# Patient Record
Sex: MALE | Race: BLACK OR AFRICAN AMERICAN | Employment: OTHER | URBAN - METROPOLITAN AREA
[De-identification: names, ages, dates, MRNs, and addresses within clinical notes are randomized per-mention and may not be internally consistent; named-entity substitution may affect disease eponyms.]

---

## 2017-03-02 ENCOUNTER — HOSPITAL ENCOUNTER (OUTPATIENT)
Dept: ULTRASOUND IMAGING | Age: 82
Discharge: HOME | End: 2017-03-02
Attending: SPECIALIST | Admitting: SPECIALIST

## 2017-03-02 DIAGNOSIS — N28.89 OTHER SPECIFIED DISORDERS OF KIDNEY AND URETER: ICD-10-CM

## 2017-03-31 ENCOUNTER — HOSPITAL ENCOUNTER (OUTPATIENT)
Dept: GENERAL RADIOLOGY | Age: 82
Discharge: HOME | End: 2017-03-31
Attending: INTERNAL MEDICINE | Admitting: INTERNAL MEDICINE

## 2017-03-31 LAB
ANION GAP SERPL CALCULATED.3IONS-SCNC: 15 MMOL/L (ref 4–16)
BUN BLDV-MCNC: 27 MG/DL (ref 6–23)
CALCIUM SERPL-MCNC: 9.1 MG/DL (ref 8.3–10.6)
CHLORIDE BLD-SCNC: 100 MMOL/L (ref 99–110)
CO2: 25 MMOL/L (ref 21–32)
CREAT SERPL-MCNC: 2 MG/DL (ref 0.9–1.3)
GFR AFRICAN AMERICAN: 39 ML/MIN/1.73M2
GFR NON-AFRICAN AMERICAN: 32 ML/MIN/1.73M2
GLUCOSE BLD-MCNC: 101 MG/DL (ref 70–140)
POTASSIUM SERPL-SCNC: 3.8 MMOL/L (ref 3.5–5.1)
SODIUM BLD-SCNC: 140 MMOL/L (ref 135–145)
T4 FREE: 1.15 NG/DL (ref 0.9–1.8)
TSH HIGH SENSITIVITY: 0.02 UIU/ML (ref 0.27–4.2)
VITAMIN D 25-HYDROXY: 60 NG/ML

## 2017-04-10 ENCOUNTER — HOSPITAL ENCOUNTER (OUTPATIENT)
Dept: GENERAL RADIOLOGY | Age: 82
Discharge: HOME | End: 2017-04-10
Attending: INTERNAL MEDICINE | Admitting: INTERNAL MEDICINE

## 2017-04-10 LAB
ALBUMIN SERPL-MCNC: 3.8 GM/DL (ref 3.4–5)
ANION GAP SERPL CALCULATED.3IONS-SCNC: 13 MMOL/L (ref 4–16)
BUN BLDV-MCNC: 25 MG/DL (ref 6–23)
CALCIUM SERPL-MCNC: 9.2 MG/DL (ref 8.3–10.6)
CHLORIDE BLD-SCNC: 99 MMOL/L (ref 99–110)
CO2: 25 MMOL/L (ref 21–32)
CREAT SERPL-MCNC: 2.1 MG/DL (ref 0.9–1.3)
GFR AFRICAN AMERICAN: 37 ML/MIN/1.73M2
GFR NON-AFRICAN AMERICAN: 31 ML/MIN/1.73M2
GLUCOSE BLD-MCNC: 110 MG/DL (ref 70–140)
PHOSPHORUS: 2.8 MG/DL (ref 2.5–4.9)
POTASSIUM SERPL-SCNC: 3.9 MMOL/L (ref 3.5–5.1)
SODIUM BLD-SCNC: 137 MMOL/L (ref 135–145)

## 2017-06-26 ENCOUNTER — HOSPITAL ENCOUNTER (OUTPATIENT)
Dept: GENERAL RADIOLOGY | Age: 82
Discharge: HOME | End: 2017-06-26
Attending: INTERNAL MEDICINE | Admitting: INTERNAL MEDICINE

## 2017-06-26 DIAGNOSIS — M85.9 DISORDER OF BONE DENSITY AND STRUCTURE, UNSPECIFIED: ICD-10-CM

## 2017-06-26 LAB
ANION GAP SERPL CALCULATED.3IONS-SCNC: 13 MMOL/L (ref 4–16)
BUN BLDV-MCNC: 30 MG/DL (ref 6–23)
CALCIUM SERPL-MCNC: 9.3 MG/DL (ref 8.3–10.6)
CHLORIDE BLD-SCNC: 103 MMOL/L (ref 99–110)
CO2: 26 MMOL/L (ref 21–32)
CREAT SERPL-MCNC: 1.9 MG/DL (ref 0.9–1.3)
GFR AFRICAN AMERICAN: 41 ML/MIN/1.73M2
GFR NON-AFRICAN AMERICAN: 34 ML/MIN/1.73M2
GLUCOSE BLD-MCNC: 83 MG/DL (ref 70–140)
POTASSIUM SERPL-SCNC: 4.4 MMOL/L (ref 3.5–5.1)
SODIUM BLD-SCNC: 142 MMOL/L (ref 135–145)
T4 FREE: 1.26 NG/DL (ref 0.9–1.8)
TSH HIGH SENSITIVITY: 0.02 UIU/ML (ref 0.27–4.2)
VITAMIN D 25-HYDROXY: 60 NG/ML

## 2017-06-28 LAB
SEX HORMONE BINDING GLOBULIN: 81
TESTOSTERONE FREE PERCENT: 1.1
TESTOSTERONE FREE: 67
TESTOSTERONE TOTAL-MALE: 624

## 2017-08-25 ENCOUNTER — HOSPITAL ENCOUNTER (OUTPATIENT)
Dept: CT IMAGING | Age: 82
Discharge: HOME | End: 2017-08-25
Attending: FAMILY MEDICINE | Admitting: FAMILY MEDICINE

## 2017-08-25 DIAGNOSIS — H93.13 TINNITUS OF BOTH EARS: ICD-10-CM

## 2017-08-25 DIAGNOSIS — J01.90 ACUTE SINUSITIS, UNSPECIFIED: ICD-10-CM

## 2017-09-21 ENCOUNTER — HOSPITAL ENCOUNTER (OUTPATIENT)
Dept: GENERAL RADIOLOGY | Age: 82
Discharge: HOME | End: 2017-09-21
Attending: INTERNAL MEDICINE | Admitting: INTERNAL MEDICINE

## 2017-09-21 LAB
ALBUMIN SERPL-MCNC: 3.8 GM/DL (ref 3.4–5)
ANION GAP SERPL CALCULATED.3IONS-SCNC: 15 MMOL/L (ref 4–16)
BUN BLDV-MCNC: 25 MG/DL (ref 6–23)
CALCIUM SERPL-MCNC: 8.9 MG/DL (ref 8.3–10.6)
CHLORIDE BLD-SCNC: 103 MMOL/L (ref 99–110)
CO2: 22 MMOL/L (ref 21–32)
CREAT SERPL-MCNC: 1.9 MG/DL (ref 0.9–1.3)
GFR AFRICAN AMERICAN: 41 ML/MIN/1.73M2
GFR NON-AFRICAN AMERICAN: 34 ML/MIN/1.73M2
GLUCOSE BLD-MCNC: 64 MG/DL (ref 70–140)
PHOSPHORUS: 3.5 MG/DL (ref 2.5–4.9)
POTASSIUM SERPL-SCNC: 4.3 MMOL/L (ref 3.5–5.1)
SODIUM BLD-SCNC: 140 MMOL/L (ref 135–145)

## 2017-09-29 ENCOUNTER — HOSPITAL ENCOUNTER (OUTPATIENT)
Dept: GENERAL RADIOLOGY | Age: 82
Discharge: HOME | End: 2017-09-29
Attending: INTERNAL MEDICINE | Admitting: INTERNAL MEDICINE

## 2017-09-29 LAB
ANION GAP SERPL CALCULATED.3IONS-SCNC: 13 MMOL/L (ref 4–16)
BUN BLDV-MCNC: 24 MG/DL (ref 6–23)
CALCIUM SERPL-MCNC: 9.1 MG/DL (ref 8.3–10.6)
CHLORIDE BLD-SCNC: 102 MMOL/L (ref 99–110)
CO2: 24 MMOL/L (ref 21–32)
CREAT SERPL-MCNC: 1.9 MG/DL (ref 0.9–1.3)
GFR AFRICAN AMERICAN: 41 ML/MIN/1.73M2
GFR NON-AFRICAN AMERICAN: 34 ML/MIN/1.73M2
GLUCOSE BLD-MCNC: 83 MG/DL (ref 70–140)
POTASSIUM SERPL-SCNC: 4.1 MMOL/L (ref 3.5–5.1)
SODIUM BLD-SCNC: 139 MMOL/L (ref 135–145)
T4 FREE: 1.27 NG/DL (ref 0.9–1.8)
TSH HIGH SENSITIVITY: 0.02 UIU/ML (ref 0.27–4.2)

## 2017-12-27 ENCOUNTER — HOSPITAL ENCOUNTER (OUTPATIENT)
Dept: GENERAL RADIOLOGY | Age: 82
Discharge: HOME | End: 2017-12-27
Attending: INTERNAL MEDICINE | Admitting: INTERNAL MEDICINE

## 2017-12-27 LAB
ANION GAP SERPL CALCULATED.3IONS-SCNC: 14 MMOL/L (ref 4–16)
BUN BLDV-MCNC: 30 MG/DL (ref 6–23)
CALCIUM SERPL-MCNC: 9.3 MG/DL (ref 8.3–10.6)
CHLORIDE BLD-SCNC: 105 MMOL/L (ref 99–110)
CO2: 25 MMOL/L (ref 21–32)
CREAT SERPL-MCNC: 1.8 MG/DL (ref 0.9–1.3)
GFR AFRICAN AMERICAN: 44 ML/MIN/1.73M2
GFR NON-AFRICAN AMERICAN: 36 ML/MIN/1.73M2
GLUCOSE BLD-MCNC: 71 MG/DL (ref 70–99)
POTASSIUM SERPL-SCNC: 4.1 MMOL/L (ref 3.5–5.1)
SODIUM BLD-SCNC: 144 MMOL/L (ref 135–145)
T4 FREE: 1.3 NG/DL (ref 0.9–1.8)
TSH HIGH SENSITIVITY: 0.03 UIU/ML (ref 0.27–4.2)

## 2018-04-02 ENCOUNTER — HOSPITAL ENCOUNTER (OUTPATIENT)
Dept: GENERAL RADIOLOGY | Age: 83
Discharge: HOME | End: 2018-04-02
Attending: INTERNAL MEDICINE | Admitting: INTERNAL MEDICINE

## 2018-04-02 LAB
ALBUMIN SERPL-MCNC: 3.9 GM/DL (ref 3.4–5)
ANION GAP SERPL CALCULATED.3IONS-SCNC: 13 MMOL/L (ref 4–16)
BUN BLDV-MCNC: 29 MG/DL (ref 6–23)
CALCIUM SERPL-MCNC: 9.3 MG/DL (ref 8.3–10.6)
CHLORIDE BLD-SCNC: 100 MMOL/L (ref 99–110)
CO2: 27 MMOL/L (ref 21–32)
CREAT SERPL-MCNC: 1.9 MG/DL (ref 0.9–1.3)
GFR AFRICAN AMERICAN: 41 ML/MIN/1.73M2
GFR NON-AFRICAN AMERICAN: 34 ML/MIN/1.73M2
GLUCOSE BLD-MCNC: 83 MG/DL (ref 70–99)
PHOSPHORUS: 3.5 MG/DL (ref 2.5–4.9)
POTASSIUM SERPL-SCNC: 4.4 MMOL/L (ref 3.5–5.1)
SODIUM BLD-SCNC: 140 MMOL/L (ref 135–145)

## 2018-07-11 ENCOUNTER — HOSPITAL ENCOUNTER (OUTPATIENT)
Dept: GENERAL RADIOLOGY | Age: 83
Discharge: HOME | End: 2018-07-11
Attending: INTERNAL MEDICINE | Admitting: INTERNAL MEDICINE

## 2018-07-11 LAB
ANION GAP SERPL CALCULATED.3IONS-SCNC: 16 MMOL/L (ref 4–16)
BUN BLDV-MCNC: 23 MG/DL (ref 6–23)
CALCIUM SERPL-MCNC: 8.8 MG/DL (ref 8.3–10.6)
CHLORIDE BLD-SCNC: 104 MMOL/L (ref 99–110)
CO2: 23 MMOL/L (ref 21–32)
CREAT SERPL-MCNC: 1.9 MG/DL (ref 0.9–1.3)
GFR AFRICAN AMERICAN: 41 ML/MIN/1.73M2
GFR NON-AFRICAN AMERICAN: 34 ML/MIN/1.73M2
GLUCOSE BLD-MCNC: 101 MG/DL (ref 70–99)
POTASSIUM SERPL-SCNC: 4 MMOL/L (ref 3.5–5.1)
SODIUM BLD-SCNC: 143 MMOL/L (ref 135–145)
T4 FREE: 1.05 NG/DL (ref 0.9–1.8)
TSH HIGH SENSITIVITY: 0.04 UIU/ML (ref 0.27–4.2)

## 2018-10-08 ENCOUNTER — HOSPITAL ENCOUNTER (OUTPATIENT)
Age: 83
Discharge: HOME | End: 2018-10-08
Payer: MEDICARE

## 2018-10-08 LAB
ALBUMIN SERPL-MCNC: 3.8 GM/DL (ref 3.4–5)
ANION GAP SERPL CALCULATED.3IONS-SCNC: 12 MMOL/L (ref 4–16)
BACTERIA: NEGATIVE /HPF
BILIRUBIN URINE: NEGATIVE MG/DL
BLOOD, URINE: NEGATIVE
BUN BLDV-MCNC: 23 MG/DL (ref 6–23)
CALCIUM SERPL-MCNC: 8.8 MG/DL (ref 8.3–10.6)
CHLORIDE BLD-SCNC: 105 MMOL/L (ref 99–110)
CLARITY: CLEAR
CO2: 23 MMOL/L (ref 21–32)
COLOR: ABNORMAL
CREAT SERPL-MCNC: 1.9 MG/DL (ref 0.9–1.3)
GFR AFRICAN AMERICAN: 41 ML/MIN/1.73M2
GFR NON-AFRICAN AMERICAN: 34 ML/MIN/1.73M2
GLUCOSE BLD-MCNC: 102 MG/DL (ref 70–99)
GLUCOSE, URINE: NEGATIVE MG/DL
KETONES, URINE: NEGATIVE MG/DL
LEUKOCYTE ESTERASE, URINE: NEGATIVE
MUCUS: ABNORMAL HPF
NITRITE URINE, QUANTITATIVE: NEGATIVE
PH, URINE: 5 (ref 5–8)
PHOSPHORUS: 2.2 MG/DL (ref 2.5–4.9)
POTASSIUM SERPL-SCNC: 4.3 MMOL/L (ref 3.5–5.1)
PROTEIN UA: NEGATIVE MG/DL
RBC URINE: ABNORMAL /HPF (ref 0–3)
SODIUM BLD-SCNC: 140 MMOL/L (ref 135–145)
SPECIFIC GRAVITY UA: 1.01 (ref 1–1.03)
TRICHOMONAS: ABNORMAL /HPF
UROBILINOGEN, URINE: NORMAL MG/DL (ref 0.2–1)
WBC UA: <1 /HPF (ref 0–2)

## 2018-10-08 PROCEDURE — 81001 URINALYSIS AUTO W/SCOPE: CPT

## 2018-10-08 PROCEDURE — 80069 RENAL FUNCTION PANEL: CPT

## 2018-10-08 PROCEDURE — 36415 COLL VENOUS BLD VENIPUNCTURE: CPT

## 2018-11-05 ENCOUNTER — HOSPITAL ENCOUNTER (OUTPATIENT)
Dept: ULTRASOUND IMAGING | Age: 83
Discharge: HOME | End: 2018-11-05
Payer: MEDICARE

## 2018-11-05 DIAGNOSIS — N28.1 ACQUIRED CYST OF KIDNEY: ICD-10-CM

## 2018-11-05 PROCEDURE — 76775 US EXAM ABDO BACK WALL LIM: CPT

## 2019-01-09 ENCOUNTER — HOSPITAL ENCOUNTER (OUTPATIENT)
Age: 84
Discharge: HOME | End: 2019-01-09
Payer: MEDICARE

## 2019-01-09 LAB
ALBUMIN SERPL-MCNC: 4 GM/DL (ref 3.4–5)
ANION GAP SERPL CALCULATED.3IONS-SCNC: 13 MMOL/L (ref 4–16)
BUN BLDV-MCNC: 25 MG/DL (ref 6–23)
CALCIUM SERPL-MCNC: 9.1 MG/DL (ref 8.3–10.6)
CHLORIDE BLD-SCNC: 104 MMOL/L (ref 99–110)
CO2: 24 MMOL/L (ref 21–32)
CREAT SERPL-MCNC: 2.1 MG/DL (ref 0.9–1.3)
GFR AFRICAN AMERICAN: 37 ML/MIN/1.73M2
GFR NON-AFRICAN AMERICAN: 30 ML/MIN/1.73M2
GLUCOSE BLD-MCNC: 111 MG/DL (ref 70–99)
PHOSPHORUS: 2.2 MG/DL (ref 2.5–4.9)
POTASSIUM SERPL-SCNC: 4 MMOL/L (ref 3.5–5.1)
SODIUM BLD-SCNC: 141 MMOL/L (ref 135–145)

## 2019-01-09 PROCEDURE — 80069 RENAL FUNCTION PANEL: CPT

## 2019-01-09 PROCEDURE — 36415 COLL VENOUS BLD VENIPUNCTURE: CPT

## 2019-01-15 ENCOUNTER — HOSPITAL ENCOUNTER (OUTPATIENT)
Age: 84
Discharge: HOME | End: 2019-01-15
Payer: MEDICARE

## 2019-01-15 LAB
ANION GAP SERPL CALCULATED.3IONS-SCNC: 12 MMOL/L (ref 4–16)
BUN BLDV-MCNC: 28 MG/DL (ref 6–23)
CALCIUM SERPL-MCNC: 8.9 MG/DL (ref 8.3–10.6)
CHLORIDE BLD-SCNC: 108 MMOL/L (ref 99–110)
CO2: 24 MMOL/L (ref 21–32)
CREAT SERPL-MCNC: 2.1 MG/DL (ref 0.9–1.3)
GFR AFRICAN AMERICAN: 37 ML/MIN/1.73M2
GFR NON-AFRICAN AMERICAN: 30 ML/MIN/1.73M2
GLUCOSE BLD-MCNC: 100 MG/DL (ref 70–99)
POTASSIUM SERPL-SCNC: 3.8 MMOL/L (ref 3.5–5.1)
SODIUM BLD-SCNC: 144 MMOL/L (ref 135–145)
T4 FREE: 1.07 NG/DL (ref 0.9–1.8)
TSH HIGH SENSITIVITY: 0.02 UIU/ML (ref 0.27–4.2)
VITAMIN D 25-HYDROXY: 60 NG/ML

## 2019-01-15 PROCEDURE — 82306 VITAMIN D 25 HYDROXY: CPT

## 2019-01-15 PROCEDURE — 84439 ASSAY OF FREE THYROXINE: CPT

## 2019-01-15 PROCEDURE — 84270 ASSAY OF SEX HORMONE GLOBUL: CPT

## 2019-01-15 PROCEDURE — 36415 COLL VENOUS BLD VENIPUNCTURE: CPT

## 2019-01-15 PROCEDURE — 80048 BASIC METABOLIC PNL TOTAL CA: CPT

## 2019-01-15 PROCEDURE — 84443 ASSAY THYROID STIM HORMONE: CPT

## 2019-01-15 PROCEDURE — 84403 ASSAY OF TOTAL TESTOSTERONE: CPT

## 2019-01-18 LAB
SEX HORMONE BINDING GLOBULIN: 116
TESTOSTERONE FREE PERCENT: 0.8
TESTOSTERONE FREE: 40
TESTOSTERONE TOTAL-MALE: 522

## 2019-05-13 ENCOUNTER — HOSPITAL ENCOUNTER (OUTPATIENT)
Age: 84
Discharge: HOME | End: 2019-05-13
Payer: MEDICARE

## 2019-05-13 LAB
ALBUMIN SERPL-MCNC: 3.7 GM/DL (ref 3.4–5)
ANION GAP SERPL CALCULATED.3IONS-SCNC: 13 MMOL/L (ref 4–16)
BUN BLDV-MCNC: 24 MG/DL (ref 6–23)
CALCIUM SERPL-MCNC: 9.1 MG/DL (ref 8.3–10.6)
CHLORIDE BLD-SCNC: 105 MMOL/L (ref 99–110)
CO2: 23 MMOL/L (ref 21–32)
CREAT SERPL-MCNC: 1.9 MG/DL (ref 0.9–1.3)
GFR AFRICAN AMERICAN: 41 ML/MIN/1.73M2
GFR NON-AFRICAN AMERICAN: 34 ML/MIN/1.73M2
GLUCOSE BLD-MCNC: 117 MG/DL (ref 70–99)
PHOSPHORUS: 2.5 MG/DL (ref 2.5–4.9)
POTASSIUM SERPL-SCNC: 3.6 MMOL/L (ref 3.5–5.1)
SODIUM BLD-SCNC: 141 MMOL/L (ref 135–145)
URIC ACID: 7.5 MG/DL (ref 3.5–7.2)

## 2019-05-13 PROCEDURE — 84550 ASSAY OF BLOOD/URIC ACID: CPT

## 2019-05-13 PROCEDURE — 36415 COLL VENOUS BLD VENIPUNCTURE: CPT

## 2019-05-13 PROCEDURE — 80069 RENAL FUNCTION PANEL: CPT

## 2019-07-15 ENCOUNTER — HOSPITAL ENCOUNTER (OUTPATIENT)
Age: 84
Discharge: HOME | End: 2019-07-15
Payer: MEDICARE

## 2019-07-15 LAB
ALBUMIN SERPL-MCNC: 3.9 GM/DL (ref 3.4–5)
ALP BLD-CCNC: 79 IU/L (ref 40–128)
ALT SERPL-CCNC: 10 U/L (ref 10–40)
ANION GAP SERPL CALCULATED.3IONS-SCNC: 12 MMOL/L (ref 4–16)
AST SERPL-CCNC: 14 IU/L (ref 15–37)
BILIRUB SERPL-MCNC: 0.3 MG/DL (ref 0–1)
BUN BLDV-MCNC: 31 MG/DL (ref 6–23)
CALCIUM SERPL-MCNC: 9.4 MG/DL (ref 8.3–10.6)
CHLORIDE BLD-SCNC: 104 MMOL/L (ref 99–110)
CHOLESTEROL: 130 MG/DL
CO2: 24 MMOL/L (ref 21–32)
CREAT SERPL-MCNC: 2.1 MG/DL (ref 0.9–1.3)
GFR AFRICAN AMERICAN: 37 ML/MIN/1.73M2
GFR NON-AFRICAN AMERICAN: 30 ML/MIN/1.73M2
GLUCOSE BLD-MCNC: 79 MG/DL (ref 70–99)
HDLC SERPL-MCNC: 47 MG/DL
LDL CHOLESTEROL DIRECT: 78 MG/DL
POTASSIUM SERPL-SCNC: 4.4 MMOL/L (ref 3.5–5.1)
SODIUM BLD-SCNC: 140 MMOL/L (ref 135–145)
T4 FREE: 1.19 NG/DL (ref 0.9–1.8)
TOTAL PROTEIN: 7.6 GM/DL (ref 6.4–8.2)
TRIGL SERPL-MCNC: 101 MG/DL
TSH HIGH SENSITIVITY: 0.03 UIU/ML (ref 0.27–4.2)

## 2019-07-15 PROCEDURE — 80053 COMPREHEN METABOLIC PANEL: CPT

## 2019-07-15 PROCEDURE — 84443 ASSAY THYROID STIM HORMONE: CPT

## 2019-07-15 PROCEDURE — 36415 COLL VENOUS BLD VENIPUNCTURE: CPT

## 2019-07-15 PROCEDURE — 84439 ASSAY OF FREE THYROXINE: CPT

## 2019-07-15 PROCEDURE — 83721 ASSAY OF BLOOD LIPOPROTEIN: CPT

## 2019-07-15 PROCEDURE — 80061 LIPID PANEL: CPT

## 2019-09-05 ENCOUNTER — HOSPITAL ENCOUNTER (OUTPATIENT)
Age: 84
Discharge: HOME | End: 2019-09-05
Payer: MEDICARE

## 2019-09-05 LAB
ALBUMIN SERPL-MCNC: 3.6 GM/DL (ref 3.4–5)
ANION GAP SERPL CALCULATED.3IONS-SCNC: 12 MMOL/L (ref 4–16)
BUN BLDV-MCNC: 25 MG/DL (ref 6–23)
CALCIUM SERPL-MCNC: 9.6 MG/DL (ref 8.3–10.6)
CHLORIDE BLD-SCNC: 104 MMOL/L (ref 99–110)
CO2: 25 MMOL/L (ref 21–32)
CREAT SERPL-MCNC: 1.9 MG/DL (ref 0.9–1.3)
GFR AFRICAN AMERICAN: 41 ML/MIN/1.73M2
GFR NON-AFRICAN AMERICAN: 34 ML/MIN/1.73M2
GLUCOSE BLD-MCNC: 82 MG/DL (ref 70–99)
PHOSPHORUS: 3.1 MG/DL (ref 2.5–4.9)
POTASSIUM SERPL-SCNC: 4.7 MMOL/L (ref 3.5–5.1)
SODIUM BLD-SCNC: 141 MMOL/L (ref 135–145)

## 2019-09-05 PROCEDURE — 36415 COLL VENOUS BLD VENIPUNCTURE: CPT

## 2019-09-05 PROCEDURE — 80069 RENAL FUNCTION PANEL: CPT

## 2019-11-14 ENCOUNTER — HOSPITAL ENCOUNTER (OUTPATIENT)
Dept: CT IMAGING | Age: 84
Discharge: HOME | End: 2019-11-14
Payer: MEDICARE

## 2019-11-14 DIAGNOSIS — N40.0 ENLARGED PROSTATE: ICD-10-CM

## 2019-11-14 DIAGNOSIS — N40.0 ENLARGED PROSTATE WITHOUT LOWER URINARY TRACT SYMPTOMS (LUTS): ICD-10-CM

## 2019-11-14 PROCEDURE — 74150 CT ABDOMEN W/O CONTRAST: CPT

## 2019-11-26 ENCOUNTER — HOSPITAL ENCOUNTER (OUTPATIENT)
Age: 84
Discharge: HOME | End: 2019-11-26
Payer: MEDICARE

## 2019-11-26 LAB
T4 FREE: 1.18 NG/DL (ref 0.9–1.8)
TSH HIGH SENSITIVITY: 0.03 UIU/ML (ref 0.27–4.2)

## 2019-11-26 PROCEDURE — 84443 ASSAY THYROID STIM HORMONE: CPT

## 2019-11-26 PROCEDURE — 84439 ASSAY OF FREE THYROXINE: CPT

## 2019-11-26 PROCEDURE — 36415 COLL VENOUS BLD VENIPUNCTURE: CPT

## 2019-12-03 ENCOUNTER — OFFICE VISIT (OUTPATIENT)
Dept: FAMILY MEDICINE CLINIC | Age: 84
End: 2019-12-03
Payer: MEDICARE

## 2019-12-03 VITALS
TEMPERATURE: 97.5 F | WEIGHT: 172 LBS | DIASTOLIC BLOOD PRESSURE: 70 MMHG | HEART RATE: 57 BPM | SYSTOLIC BLOOD PRESSURE: 140 MMHG | OXYGEN SATURATION: 95 % | BODY MASS INDEX: 24.08 KG/M2 | HEIGHT: 71 IN

## 2019-12-03 DIAGNOSIS — J01.90 ACUTE BACTERIAL SINUSITIS: Primary | ICD-10-CM

## 2019-12-03 DIAGNOSIS — B96.89 ACUTE BACTERIAL SINUSITIS: Primary | ICD-10-CM

## 2019-12-03 PROCEDURE — 1036F TOBACCO NON-USER: CPT | Performed by: NURSE PRACTITIONER

## 2019-12-03 PROCEDURE — G8427 DOCREV CUR MEDS BY ELIG CLIN: HCPCS | Performed by: NURSE PRACTITIONER

## 2019-12-03 PROCEDURE — G8484 FLU IMMUNIZE NO ADMIN: HCPCS | Performed by: NURSE PRACTITIONER

## 2019-12-03 PROCEDURE — 99202 OFFICE O/P NEW SF 15 MIN: CPT | Performed by: NURSE PRACTITIONER

## 2019-12-03 PROCEDURE — 4040F PNEUMOC VAC/ADMIN/RCVD: CPT | Performed by: NURSE PRACTITIONER

## 2019-12-03 PROCEDURE — 1123F ACP DISCUSS/DSCN MKR DOCD: CPT | Performed by: NURSE PRACTITIONER

## 2019-12-03 PROCEDURE — G8420 CALC BMI NORM PARAMETERS: HCPCS | Performed by: NURSE PRACTITIONER

## 2019-12-03 RX ORDER — ANASTROZOLE 1 MG/1
1 TABLET ORAL DAILY
COMMUNITY
End: 2020-09-10 | Stop reason: ALTCHOICE

## 2019-12-03 RX ORDER — LANOLIN ALCOHOL/MO/W.PET/CERES
1000 CREAM (GRAM) TOPICAL DAILY
COMMUNITY

## 2019-12-03 RX ORDER — DOXAZOSIN MESYLATE 4 MG/1
8 TABLET ORAL
Refills: 3 | COMMUNITY
Start: 2019-10-01 | End: 2022-09-19

## 2019-12-03 RX ORDER — SIMVASTATIN 10 MG
10 TABLET ORAL NIGHTLY
COMMUNITY

## 2019-12-03 RX ORDER — LOSARTAN POTASSIUM AND HYDROCHLOROTHIAZIDE 12.5; 5 MG/1; MG/1
TABLET ORAL
Refills: 0 | Status: ON HOLD | COMMUNITY
Start: 2019-10-27 | End: 2020-02-09 | Stop reason: HOSPADM

## 2019-12-03 RX ORDER — NEBIVOLOL 10 MG/1
TABLET ORAL DAILY
COMMUNITY

## 2019-12-03 RX ORDER — CALCIUM CARBONATE 300MG(750)
TABLET,CHEWABLE ORAL
COMMUNITY
End: 2020-10-29

## 2019-12-03 RX ORDER — DOXYCYCLINE HYCLATE 100 MG
100 TABLET ORAL 2 TIMES DAILY
Qty: 20 TABLET | Refills: 0 | Status: SHIPPED | OUTPATIENT
Start: 2019-12-03 | End: 2019-12-13

## 2019-12-03 RX ORDER — FLUTICASONE PROPIONATE 50 MCG
1 SPRAY, SUSPENSION (ML) NASAL DAILY
Qty: 1 BOTTLE | Refills: 0 | Status: SHIPPED | OUTPATIENT
Start: 2019-12-03 | End: 2020-10-29

## 2019-12-03 ASSESSMENT — ENCOUNTER SYMPTOMS
RHINORRHEA: 1
SWOLLEN GLANDS: 0
SORE THROAT: 0
VOMITING: 0
CHEST TIGHTNESS: 0
DIARRHEA: 0
SINUS PAIN: 1
SHORTNESS OF BREATH: 0
SINUS PRESSURE: 1
WHEEZING: 0
NAUSEA: 0
HOARSE VOICE: 0
COUGH: 0

## 2020-01-06 ENCOUNTER — HOSPITAL ENCOUNTER (OUTPATIENT)
Age: 85
Discharge: HOME | End: 2020-01-06
Payer: MEDICARE

## 2020-01-06 LAB
ALBUMIN SERPL-MCNC: 3.7 GM/DL (ref 3.4–5)
ANION GAP SERPL CALCULATED.3IONS-SCNC: 12 MMOL/L (ref 4–16)
BUN BLDV-MCNC: 30 MG/DL (ref 6–23)
CALCIUM SERPL-MCNC: 8.8 MG/DL (ref 8.3–10.6)
CHLORIDE BLD-SCNC: 99 MMOL/L (ref 99–110)
CO2: 24 MMOL/L (ref 21–32)
CREAT SERPL-MCNC: 2.1 MG/DL (ref 0.9–1.3)
CREATININE URINE: 72.7 MG/DL (ref 39–259)
GFR AFRICAN AMERICAN: 37 ML/MIN/1.73M2
GFR NON-AFRICAN AMERICAN: 30 ML/MIN/1.73M2
GLUCOSE BLD-MCNC: 79 MG/DL (ref 70–99)
PHOSPHORUS: 3.2 MG/DL (ref 2.5–4.9)
POTASSIUM SERPL-SCNC: 4 MMOL/L (ref 3.5–5.1)
PROT/CREAT RATIO, UR: ABNORMAL
SODIUM BLD-SCNC: 135 MMOL/L (ref 135–145)
URINE TOTAL PROTEIN: 16.9 MG/DL

## 2020-01-06 PROCEDURE — 36415 COLL VENOUS BLD VENIPUNCTURE: CPT

## 2020-01-06 PROCEDURE — 84156 ASSAY OF PROTEIN URINE: CPT

## 2020-01-06 PROCEDURE — 82570 ASSAY OF URINE CREATININE: CPT

## 2020-01-06 PROCEDURE — 80069 RENAL FUNCTION PANEL: CPT

## 2020-02-03 ENCOUNTER — APPOINTMENT (OUTPATIENT)
Dept: CT IMAGING | Age: 85
DRG: 378 | End: 2020-02-03
Payer: MEDICARE

## 2020-02-03 ENCOUNTER — HOSPITAL ENCOUNTER (INPATIENT)
Age: 85
LOS: 6 days | Discharge: HOME | DRG: 378 | End: 2020-02-09
Attending: EMERGENCY MEDICINE | Admitting: HOSPITALIST
Payer: MEDICARE

## 2020-02-03 ENCOUNTER — APPOINTMENT (OUTPATIENT)
Dept: GENERAL RADIOLOGY | Age: 85
DRG: 378 | End: 2020-02-03
Payer: MEDICARE

## 2020-02-03 PROBLEM — N18.9 ACUTE KIDNEY INJURY SUPERIMPOSED ON CKD (HCC): Status: ACTIVE | Noted: 2020-02-03

## 2020-02-03 PROBLEM — N17.9 ACUTE KIDNEY INJURY SUPERIMPOSED ON CKD (HCC): Status: ACTIVE | Noted: 2020-02-03

## 2020-02-03 PROBLEM — K92.1 MELENA: Status: ACTIVE | Noted: 2020-02-03

## 2020-02-03 PROBLEM — K92.2 GI BLEED: Status: ACTIVE | Noted: 2020-02-03

## 2020-02-03 LAB
ALBUMIN SERPL-MCNC: 3 GM/DL (ref 3.4–5)
ALP BLD-CCNC: 72 IU/L (ref 40–128)
ALT SERPL-CCNC: 11 U/L (ref 10–40)
ANION GAP SERPL CALCULATED.3IONS-SCNC: 14 MMOL/L (ref 4–16)
AST SERPL-CCNC: 14 IU/L (ref 15–37)
BACTERIA: NEGATIVE /HPF
BASOPHILS ABSOLUTE: 0 K/CU MM
BASOPHILS RELATIVE PERCENT: 0.2 % (ref 0–1)
BILIRUB SERPL-MCNC: 0.2 MG/DL (ref 0–1)
BILIRUBIN URINE: NEGATIVE MG/DL
BLOOD, URINE: NEGATIVE
BUN BLDV-MCNC: 40 MG/DL (ref 6–23)
CALCIUM SERPL-MCNC: 8.6 MG/DL (ref 8.3–10.6)
CHLORIDE BLD-SCNC: 99 MMOL/L (ref 99–110)
CLARITY: CLEAR
CO2: 21 MMOL/L (ref 21–32)
COLOR: ABNORMAL
CREAT SERPL-MCNC: 2.5 MG/DL (ref 0.9–1.3)
DIFFERENTIAL TYPE: ABNORMAL
EOSINOPHILS ABSOLUTE: 0 K/CU MM
EOSINOPHILS RELATIVE PERCENT: 0.5 % (ref 0–3)
GFR AFRICAN AMERICAN: 30 ML/MIN/1.73M2
GFR NON-AFRICAN AMERICAN: 25 ML/MIN/1.73M2
GLUCOSE BLD-MCNC: 112 MG/DL (ref 70–99)
GLUCOSE, URINE: NEGATIVE MG/DL
HCT VFR BLD CALC: 19.6 % (ref 42–52)
HEMOGLOBIN: ABNORMAL GM/DL (ref 13.5–18)
HYALINE CASTS: 0 /LPF
IMMATURE NEUTROPHIL %: 0.4 % (ref 0–0.43)
KETONES, URINE: NEGATIVE MG/DL
LEUKOCYTE ESTERASE, URINE: NEGATIVE
LYMPHOCYTES ABSOLUTE: 1.1 K/CU MM
LYMPHOCYTES RELATIVE PERCENT: 13.1 % (ref 24–44)
MCH RBC QN AUTO: 28.2 PG (ref 27–31)
MCHC RBC AUTO-ENTMCNC: 30.6 % (ref 32–36)
MCV RBC AUTO: 92 FL (ref 78–100)
MONOCYTES ABSOLUTE: 0.7 K/CU MM
MONOCYTES RELATIVE PERCENT: 8.3 % (ref 0–4)
NITRITE URINE, QUANTITATIVE: NEGATIVE
NUCLEATED RBC %: 0 %
PDW BLD-RTO: 14.8 % (ref 11.7–14.9)
PH, URINE: 5 (ref 5–8)
PLATELET # BLD: 146 K/CU MM (ref 140–440)
PMV BLD AUTO: 9.3 FL (ref 7.5–11.1)
POTASSIUM SERPL-SCNC: 4.2 MMOL/L (ref 3.5–5.1)
PROTEIN UA: NEGATIVE MG/DL
RAPID INFLUENZA  B AGN: NEGATIVE
RAPID INFLUENZA A AGN: NEGATIVE
RBC # BLD: 2.13 M/CU MM (ref 4.6–6.2)
RBC URINE: 1 /HPF (ref 0–3)
SEGMENTED NEUTROPHILS ABSOLUTE COUNT: 6.3 K/CU MM
SEGMENTED NEUTROPHILS RELATIVE PERCENT: 77.5 % (ref 36–66)
SODIUM BLD-SCNC: 134 MMOL/L (ref 135–145)
SPECIFIC GRAVITY UA: 1.01 (ref 1–1.03)
SQUAMOUS EPITHELIAL: <1 /HPF
TOTAL IMMATURE NEUTOROPHIL: 0.03 K/CU MM
TOTAL NUCLEATED RBC: 0 K/CU MM
TOTAL PROTEIN: 6.8 GM/DL (ref 6.4–8.2)
TRICHOMONAS: ABNORMAL /HPF
TROPONIN T: <0.01 NG/ML
UROBILINOGEN, URINE: NORMAL MG/DL (ref 0.2–1)
WBC # BLD: 8.2 K/CU MM (ref 4–10.5)
WBC UA: ABNORMAL /HPF (ref 0–2)

## 2020-02-03 PROCEDURE — 86900 BLOOD TYPING SEROLOGIC ABO: CPT

## 2020-02-03 PROCEDURE — 6360000002 HC RX W HCPCS: Performed by: EMERGENCY MEDICINE

## 2020-02-03 PROCEDURE — C9113 INJ PANTOPRAZOLE SODIUM, VIA: HCPCS | Performed by: EMERGENCY MEDICINE

## 2020-02-03 PROCEDURE — 86901 BLOOD TYPING SEROLOGIC RH(D): CPT

## 2020-02-03 PROCEDURE — 96365 THER/PROPH/DIAG IV INF INIT: CPT

## 2020-02-03 PROCEDURE — 6370000000 HC RX 637 (ALT 250 FOR IP): Performed by: NURSE PRACTITIONER

## 2020-02-03 PROCEDURE — 84484 ASSAY OF TROPONIN QUANT: CPT

## 2020-02-03 PROCEDURE — 84443 ASSAY THYROID STIM HORMONE: CPT

## 2020-02-03 PROCEDURE — 81001 URINALYSIS AUTO W/SCOPE: CPT

## 2020-02-03 PROCEDURE — C9113 INJ PANTOPRAZOLE SODIUM, VIA: HCPCS | Performed by: NURSE PRACTITIONER

## 2020-02-03 PROCEDURE — 2580000003 HC RX 258: Performed by: NURSE PRACTITIONER

## 2020-02-03 PROCEDURE — 85025 COMPLETE CBC W/AUTO DIFF WBC: CPT

## 2020-02-03 PROCEDURE — P9016 RBC LEUKOCYTES REDUCED: HCPCS

## 2020-02-03 PROCEDURE — 80053 COMPREHEN METABOLIC PANEL: CPT

## 2020-02-03 PROCEDURE — 2060000000 HC ICU INTERMEDIATE R&B

## 2020-02-03 PROCEDURE — 86922 COMPATIBILITY TEST ANTIGLOB: CPT

## 2020-02-03 PROCEDURE — 2580000003 HC RX 258: Performed by: EMERGENCY MEDICINE

## 2020-02-03 PROCEDURE — 36430 TRANSFUSION BLD/BLD COMPNT: CPT

## 2020-02-03 PROCEDURE — 99285 EMERGENCY DEPT VISIT HI MDM: CPT

## 2020-02-03 PROCEDURE — 93005 ELECTROCARDIOGRAM TRACING: CPT | Performed by: PHYSICIAN ASSISTANT

## 2020-02-03 PROCEDURE — 87804 INFLUENZA ASSAY W/OPTIC: CPT

## 2020-02-03 PROCEDURE — 86850 RBC ANTIBODY SCREEN: CPT

## 2020-02-03 PROCEDURE — 6360000002 HC RX W HCPCS: Performed by: NURSE PRACTITIONER

## 2020-02-03 PROCEDURE — 71046 X-RAY EXAM CHEST 2 VIEWS: CPT

## 2020-02-03 PROCEDURE — 72131 CT LUMBAR SPINE W/O DYE: CPT

## 2020-02-03 RX ORDER — ONDANSETRON 2 MG/ML
4 INJECTION INTRAMUSCULAR; INTRAVENOUS EVERY 6 HOURS PRN
Status: DISCONTINUED | OUTPATIENT
Start: 2020-02-03 | End: 2020-02-09 | Stop reason: HOSPADM

## 2020-02-03 RX ORDER — DOXAZOSIN MESYLATE 4 MG/1
4 TABLET ORAL DAILY
Status: DISCONTINUED | OUTPATIENT
Start: 2020-02-04 | End: 2020-02-09 | Stop reason: HOSPADM

## 2020-02-03 RX ORDER — ANASTROZOLE 1 MG/1
1 TABLET ORAL DAILY
Status: DISCONTINUED | OUTPATIENT
Start: 2020-02-04 | End: 2020-02-09 | Stop reason: HOSPADM

## 2020-02-03 RX ORDER — EZETIMIBE 10 MG/1
10 TABLET ORAL DAILY
Status: DISCONTINUED | OUTPATIENT
Start: 2020-02-04 | End: 2020-02-03 | Stop reason: CLARIF

## 2020-02-03 RX ORDER — LEVOTHYROXINE SODIUM 88 UG/1
88 TABLET ORAL DAILY
Status: DISCONTINUED | OUTPATIENT
Start: 2020-02-04 | End: 2020-02-09 | Stop reason: HOSPADM

## 2020-02-03 RX ORDER — NEBIVOLOL 10 MG/1
10 TABLET ORAL DAILY
Status: DISCONTINUED | OUTPATIENT
Start: 2020-02-04 | End: 2020-02-09 | Stop reason: HOSPADM

## 2020-02-03 RX ORDER — SODIUM CHLORIDE 0.9 % (FLUSH) 0.9 %
10 SYRINGE (ML) INJECTION EVERY 12 HOURS SCHEDULED
Status: DISCONTINUED | OUTPATIENT
Start: 2020-02-03 | End: 2020-02-09 | Stop reason: HOSPADM

## 2020-02-03 RX ORDER — HYDROCODONE BITARTRATE AND ACETAMINOPHEN 7.5; 325 MG/1; MG/1
1 TABLET ORAL EVERY 6 HOURS PRN
Status: DISCONTINUED | OUTPATIENT
Start: 2020-02-03 | End: 2020-02-09 | Stop reason: HOSPADM

## 2020-02-03 RX ORDER — CALCIUM CARBONATE 300MG(750)
400 TABLET,CHEWABLE ORAL DAILY
Status: DISCONTINUED | OUTPATIENT
Start: 2020-02-04 | End: 2020-02-03 | Stop reason: CLARIF

## 2020-02-03 RX ORDER — 0.9 % SODIUM CHLORIDE 0.9 %
20 INTRAVENOUS SOLUTION INTRAVENOUS ONCE
Status: COMPLETED | OUTPATIENT
Start: 2020-02-03 | End: 2020-02-04

## 2020-02-03 RX ORDER — SODIUM CHLORIDE 0.9 % (FLUSH) 0.9 %
10 SYRINGE (ML) INJECTION PRN
Status: DISCONTINUED | OUTPATIENT
Start: 2020-02-03 | End: 2020-02-09 | Stop reason: HOSPADM

## 2020-02-03 RX ORDER — PANTOPRAZOLE SODIUM 40 MG/10ML
40 INJECTION, POWDER, LYOPHILIZED, FOR SOLUTION INTRAVENOUS EVERY 12 HOURS
Status: DISCONTINUED | OUTPATIENT
Start: 2020-02-04 | End: 2020-02-06

## 2020-02-03 RX ORDER — LANOLIN ALCOHOL/MO/W.PET/CERES
400 CREAM (GRAM) TOPICAL DAILY
Status: DISCONTINUED | OUTPATIENT
Start: 2020-02-04 | End: 2020-02-09 | Stop reason: HOSPADM

## 2020-02-03 RX ORDER — SODIUM CHLORIDE 9 MG/ML
10 INJECTION INTRAVENOUS EVERY 12 HOURS
Status: DISCONTINUED | OUTPATIENT
Start: 2020-02-04 | End: 2020-02-06

## 2020-02-03 RX ORDER — POTASSIUM CHLORIDE 1.5 G/1.77G
20 POWDER, FOR SOLUTION ORAL DAILY
Status: DISCONTINUED | OUTPATIENT
Start: 2020-02-04 | End: 2020-02-03 | Stop reason: CLARIF

## 2020-02-03 RX ORDER — ACETAMINOPHEN 325 MG/1
650 TABLET ORAL EVERY 4 HOURS PRN
Status: DISCONTINUED | OUTPATIENT
Start: 2020-02-03 | End: 2020-02-09 | Stop reason: HOSPADM

## 2020-02-03 RX ORDER — TRAZODONE HYDROCHLORIDE 50 MG/1
25 TABLET ORAL NIGHTLY
Status: DISCONTINUED | OUTPATIENT
Start: 2020-02-03 | End: 2020-02-09 | Stop reason: HOSPADM

## 2020-02-03 RX ORDER — LORAZEPAM 1 MG/1
1 TABLET ORAL 2 TIMES DAILY
Status: DISCONTINUED | OUTPATIENT
Start: 2020-02-04 | End: 2020-02-09 | Stop reason: HOSPADM

## 2020-02-03 RX ORDER — CLORAZEPATE DIPOTASSIUM 3.75 MG/1
7.5 TABLET ORAL 2 TIMES DAILY
Status: DISCONTINUED | OUTPATIENT
Start: 2020-02-03 | End: 2020-02-03 | Stop reason: CLARIF

## 2020-02-03 RX ORDER — LANOLIN ALCOHOL/MO/W.PET/CERES
1000 CREAM (GRAM) TOPICAL DAILY
Status: DISCONTINUED | OUTPATIENT
Start: 2020-02-04 | End: 2020-02-09 | Stop reason: HOSPADM

## 2020-02-03 RX ORDER — ESOMEPRAZOLE MAGNESIUM 40 MG/1
40 FOR SUSPENSION ORAL DAILY
Status: DISCONTINUED | OUTPATIENT
Start: 2020-02-04 | End: 2020-02-03 | Stop reason: SDUPTHER

## 2020-02-03 RX ORDER — SODIUM CHLORIDE 9 MG/ML
INJECTION, SOLUTION INTRAVENOUS CONTINUOUS
Status: DISCONTINUED | OUTPATIENT
Start: 2020-02-03 | End: 2020-02-09 | Stop reason: HOSPADM

## 2020-02-03 RX ORDER — FLUTICASONE PROPIONATE 50 MCG
1 SPRAY, SUSPENSION (ML) NASAL DAILY
Status: DISCONTINUED | OUTPATIENT
Start: 2020-02-04 | End: 2020-02-09 | Stop reason: HOSPADM

## 2020-02-03 RX ORDER — SIMVASTATIN 10 MG
10 TABLET ORAL NIGHTLY
Status: DISCONTINUED | OUTPATIENT
Start: 2020-02-03 | End: 2020-02-09 | Stop reason: HOSPADM

## 2020-02-03 RX ADMIN — SIMVASTATIN 10 MG: 10 TABLET, FILM COATED ORAL at 23:54

## 2020-02-03 RX ADMIN — SODIUM CHLORIDE: 9 INJECTION, SOLUTION INTRAVENOUS at 23:53

## 2020-02-03 RX ADMIN — LORAZEPAM 1 MG: 1 TABLET ORAL at 23:54

## 2020-02-03 RX ADMIN — TRAZODONE HYDROCHLORIDE 25 MG: 50 TABLET ORAL at 23:54

## 2020-02-03 RX ADMIN — SODIUM CHLORIDE 8 MG/HR: 9 INJECTION, SOLUTION INTRAVENOUS at 23:23

## 2020-02-03 RX ADMIN — PANTOPRAZOLE SODIUM 40 MG: 40 INJECTION, POWDER, FOR SOLUTION INTRAVENOUS at 23:53

## 2020-02-03 RX ADMIN — SODIUM CHLORIDE, PRESERVATIVE FREE 10 ML: 5 INJECTION INTRAVENOUS at 23:54

## 2020-02-03 RX ADMIN — SODIUM CHLORIDE 80 MG: 9 INJECTION, SOLUTION INTRAVENOUS at 22:45

## 2020-02-03 RX ADMIN — SODIUM CHLORIDE 20 ML: 9 INJECTION, SOLUTION INTRAVENOUS at 22:22

## 2020-02-03 ASSESSMENT — ENCOUNTER SYMPTOMS
SORE THROAT: 0
CONSTIPATION: 0
ABDOMINAL PAIN: 0
DIARRHEA: 0
SHORTNESS OF BREATH: 0
ANAL BLEEDING: 1
COUGH: 0
NAUSEA: 0
WHEEZING: 0
RHINORRHEA: 0
BLOOD IN STOOL: 1
VOMITING: 0
SINUS PRESSURE: 0

## 2020-02-03 ASSESSMENT — PAIN DESCRIPTION - DESCRIPTORS
DESCRIPTORS: CONSTANT;ACHING
DESCRIPTORS: ACHING

## 2020-02-03 ASSESSMENT — PAIN SCALES - GENERAL
PAINLEVEL_OUTOF10: 9
PAINLEVEL_OUTOF10: 4

## 2020-02-03 ASSESSMENT — PAIN DESCRIPTION - PAIN TYPE
TYPE: ACUTE PAIN
TYPE: ACUTE PAIN

## 2020-02-03 ASSESSMENT — PAIN DESCRIPTION - LOCATION
LOCATION: BACK;JAW
LOCATION: BACK;JAW

## 2020-02-03 NOTE — ED NOTES
Bed: ED-14  Expected date:   Expected time:   Means of arrival:   Comments:  Triage pt Ruth Livingston RN  02/03/20 2313

## 2020-02-03 NOTE — ED PROVIDER NOTES
As provider-in-triage, I performed a medical screening history and physical exam on this patient. HISTORY OF PRESENT ILLNESS  Bhavani Yeager is a 81 y.o. male who presents for low back pain for 4 days that started spontaneously, 2 days of fatigue and general weakness and pain in left jaw. PHYSICAL EXAM  BP (!) 140/63   Pulse 82   Resp 18   Ht 5' 11\" (1.803 m)   Wt 170 lb (77.1 kg)   SpO2 96%   BMI 23.71 kg/m²     On exam, the patient appears well-hydrated, well-nourished, and in no acute distress. Mucous membranes are moist. Speech is clear. Breathing is unlabored. Skin is dry. Mental status is normal. Moves all extremities, and is without facial droop. Comment: Please note this report has been produced using speech recognition software and may contain errors related to that system including errors in grammar, punctuation, and spelling, as well as words and phrases that may be inappropriate. If there are any questions or concerns please feel free to contact the dictating provider for clarification.        Bryce Bell PA-C  02/03/20 4388

## 2020-02-04 LAB
ALBUMIN SERPL-MCNC: 3.1 GM/DL (ref 3.4–5)
ALP BLD-CCNC: 72 IU/L (ref 40–128)
ALT SERPL-CCNC: 10 U/L (ref 10–40)
ANION GAP SERPL CALCULATED.3IONS-SCNC: 12 MMOL/L (ref 4–16)
APTT: 43.9 SECONDS (ref 25.1–37.1)
AST SERPL-CCNC: 12 IU/L (ref 15–37)
BILIRUB SERPL-MCNC: 0.2 MG/DL (ref 0–1)
BUN BLDV-MCNC: 37 MG/DL (ref 6–23)
CALCIUM SERPL-MCNC: 8.2 MG/DL (ref 8.3–10.6)
CHLORIDE BLD-SCNC: 105 MMOL/L (ref 99–110)
CO2: 22 MMOL/L (ref 21–32)
CREAT SERPL-MCNC: 2.4 MG/DL (ref 0.9–1.3)
FIBRINOGEN LEVEL: 500 MG/DL (ref 196.9–442.1)
FOLATE: 13.4 NG/ML (ref 3.1–17.5)
GFR AFRICAN AMERICAN: 31 ML/MIN/1.73M2
GFR NON-AFRICAN AMERICAN: 26 ML/MIN/1.73M2
GLUCOSE BLD-MCNC: 103 MG/DL (ref 70–99)
HCT VFR BLD CALC: 21.7 % (ref 42–52)
HCT VFR BLD CALC: 24.6 % (ref 42–52)
HEMOGLOBIN: 7.7 GM/DL (ref 13.5–18)
HEMOGLOBIN: ABNORMAL GM/DL (ref 13.5–18)
INR BLD: 1.27 INDEX
IRON: 41 UG/DL (ref 59–158)
PCT TRANSFERRIN: 18 % (ref 10–44)
POTASSIUM SERPL-SCNC: 3.8 MMOL/L (ref 3.5–5.1)
PROTHROMBIN TIME: 15.4 SECONDS (ref 11.7–14.5)
SODIUM BLD-SCNC: 139 MMOL/L (ref 135–145)
TOTAL IRON BINDING CAPACITY: 234 UG/DL (ref 250–450)
TOTAL PROTEIN: 6.2 GM/DL (ref 6.4–8.2)
UNSATURATED IRON BINDING CAPACITY: 193 UG/DL (ref 110–370)
VITAMIN B-12: >2000 PG/ML (ref 211–911)

## 2020-02-04 PROCEDURE — 85610 PROTHROMBIN TIME: CPT

## 2020-02-04 PROCEDURE — 85384 FIBRINOGEN ACTIVITY: CPT

## 2020-02-04 PROCEDURE — 85018 HEMOGLOBIN: CPT

## 2020-02-04 PROCEDURE — 83550 IRON BINDING TEST: CPT

## 2020-02-04 PROCEDURE — 6360000002 HC RX W HCPCS: Performed by: NURSE PRACTITIONER

## 2020-02-04 PROCEDURE — 86900 BLOOD TYPING SEROLOGIC ABO: CPT

## 2020-02-04 PROCEDURE — 36415 COLL VENOUS BLD VENIPUNCTURE: CPT

## 2020-02-04 PROCEDURE — 2580000003 HC RX 258: Performed by: NURSE PRACTITIONER

## 2020-02-04 PROCEDURE — 6370000000 HC RX 637 (ALT 250 FOR IP): Performed by: NURSE PRACTITIONER

## 2020-02-04 PROCEDURE — P9016 RBC LEUKOCYTES REDUCED: HCPCS

## 2020-02-04 PROCEDURE — 85730 THROMBOPLASTIN TIME PARTIAL: CPT

## 2020-02-04 PROCEDURE — 82746 ASSAY OF FOLIC ACID SERUM: CPT

## 2020-02-04 PROCEDURE — 82607 VITAMIN B-12: CPT

## 2020-02-04 PROCEDURE — C9113 INJ PANTOPRAZOLE SODIUM, VIA: HCPCS | Performed by: NURSE PRACTITIONER

## 2020-02-04 PROCEDURE — 36430 TRANSFUSION BLD/BLD COMPNT: CPT

## 2020-02-04 PROCEDURE — 97116 GAIT TRAINING THERAPY: CPT

## 2020-02-04 PROCEDURE — 83540 ASSAY OF IRON: CPT

## 2020-02-04 PROCEDURE — 85014 HEMATOCRIT: CPT

## 2020-02-04 PROCEDURE — 93010 ELECTROCARDIOGRAM REPORT: CPT | Performed by: INTERNAL MEDICINE

## 2020-02-04 PROCEDURE — 97163 PT EVAL HIGH COMPLEX 45 MIN: CPT

## 2020-02-04 PROCEDURE — 80053 COMPREHEN METABOLIC PANEL: CPT

## 2020-02-04 PROCEDURE — 97112 NEUROMUSCULAR REEDUCATION: CPT

## 2020-02-04 PROCEDURE — 2060000000 HC ICU INTERMEDIATE R&B

## 2020-02-04 PROCEDURE — 97530 THERAPEUTIC ACTIVITIES: CPT

## 2020-02-04 RX ORDER — 0.9 % SODIUM CHLORIDE 0.9 %
250 INTRAVENOUS SOLUTION INTRAVENOUS ONCE
Status: COMPLETED | OUTPATIENT
Start: 2020-02-04 | End: 2020-02-04

## 2020-02-04 RX ADMIN — PANTOPRAZOLE SODIUM 40 MG: 40 INJECTION, POWDER, FOR SOLUTION INTRAVENOUS at 12:15

## 2020-02-04 RX ADMIN — SODIUM CHLORIDE, PRESERVATIVE FREE 10 ML: 5 INJECTION INTRAVENOUS at 20:13

## 2020-02-04 RX ADMIN — SODIUM CHLORIDE, PRESERVATIVE FREE 10 ML: 5 INJECTION INTRAVENOUS at 10:20

## 2020-02-04 RX ADMIN — LEVOTHYROXINE SODIUM 88 MCG: 88 TABLET ORAL at 20:12

## 2020-02-04 RX ADMIN — CYANOCOBALAMIN TAB 1000 MCG 1000 MCG: 1000 TAB at 10:19

## 2020-02-04 RX ADMIN — SERTRALINE HYDROCHLORIDE 50 MG: 50 TABLET ORAL at 10:19

## 2020-02-04 RX ADMIN — NEBIVOLOL HYDROCHLORIDE 10 MG: 10 TABLET ORAL at 10:18

## 2020-02-04 RX ADMIN — Medication 400 MG: at 10:19

## 2020-02-04 RX ADMIN — SODIUM CHLORIDE 250 ML: 9 INJECTION, SOLUTION INTRAVENOUS at 04:41

## 2020-02-04 RX ADMIN — LORAZEPAM 1 MG: 1 TABLET ORAL at 10:19

## 2020-02-04 RX ADMIN — SIMVASTATIN 10 MG: 10 TABLET, FILM COATED ORAL at 20:12

## 2020-02-04 RX ADMIN — Medication 2000 UNITS: at 10:18

## 2020-02-04 RX ADMIN — SODIUM CHLORIDE: 9 INJECTION, SOLUTION INTRAVENOUS at 15:54

## 2020-02-04 RX ADMIN — SODIUM CHLORIDE, PRESERVATIVE FREE 10 ML: 5 INJECTION INTRAVENOUS at 12:15

## 2020-02-04 RX ADMIN — DOXAZOSIN 4 MG: 4 TABLET ORAL at 10:18

## 2020-02-04 RX ADMIN — LORAZEPAM 1 MG: 1 TABLET ORAL at 20:12

## 2020-02-04 RX ADMIN — ANASTROZOLE 1 MG: 1 TABLET, COATED ORAL at 10:18

## 2020-02-04 RX ADMIN — TRAZODONE HYDROCHLORIDE 25 MG: 50 TABLET ORAL at 20:12

## 2020-02-04 ASSESSMENT — PAIN SCALES - GENERAL
PAINLEVEL_OUTOF10: 0
PAINLEVEL_OUTOF10: 4
PAINLEVEL_OUTOF10: 8

## 2020-02-04 ASSESSMENT — PAIN DESCRIPTION - PAIN TYPE
TYPE: ACUTE PAIN
TYPE: CHRONIC PAIN

## 2020-02-04 ASSESSMENT — PAIN DESCRIPTION - ORIENTATION: ORIENTATION: LOWER

## 2020-02-04 ASSESSMENT — PAIN DESCRIPTION - LOCATION: LOCATION: BACK

## 2020-02-04 NOTE — PROGRESS NOTES
Alecia Fung is a 81 y.o. male patient.     Current Facility-Administered Medications   Medication Dose Route Frequency Provider Last Rate Last Dose    sodium chloride flush 0.9 % injection 10 mL  10 mL Intravenous 2 times per day EVERTON Proctor - CNP        sodium chloride flush 0.9 % injection 10 mL  10 mL Intravenous PRN Yahaira Del Toro APRN - CNP        acetaminophen (TYLENOL) tablet 650 mg  650 mg Oral Q4H PRN Yahaira Del Toro APRN - CNP        ondansetron (ZOFRAN) injection 4 mg  4 mg Intravenous Q6H PRN Yahaira Del Toro APRN - CNP        0.9 % sodium chloride infusion   Intravenous Continuous EVERTON Proctor - CNP 75 mL/hr at 02/03/20 2353      pantoprazole (PROTONIX) injection 40 mg  40 mg Intravenous Q12H Yahaira Del Toro APRN - CNP   40 mg at 02/03/20 2353    And    sodium chloride (PF) 0.9 % injection 10 mL  10 mL Intravenous Q12H EVERTON Proctor - CNP   10 mL at 02/03/20 2354    fluticasone (FLONASE) 50 MCG/ACT nasal spray 1 spray  1 spray Nasal Daily Angi Oswald APRN - CNP        vitamin D CAPS 2,000 Units  2 capsule Oral Daily Angi Oswald APRN - BAY        anastrozole (ARIMIDEX) tablet 1 mg  1 mg Oral Daily Angi Oswald APRN - CNP        vitamin B-12 (CYANOCOBALAMIN) tablet 1,000 mcg  1,000 mcg Oral Daily Yahaira Del Toro APRN - CNP        traZODone (DESYREL) tablet 25 mg  25 mg Oral Nightly Yahaira Del Toro APRN - CNP   25 mg at 02/03/20 2354    simvastatin (ZOCOR) tablet 10 mg  10 mg Oral Nightly Yahaira Del Toro, APRN - CNP   10 mg at 02/03/20 2354    sertraline (ZOLOFT) tablet 50 mg  50 mg Oral Daily Angi Oswald APRN - CNP        nebivolol (BYSTOLIC) tablet 10 mg  10 mg Oral Daily Angi Oswald, APRN - CNP        levothyroxine (SYNTHROID) tablet 88 mcg  88 mcg Oral Daily Angi Oswald APRN - CNP        doxazosin (CARDURA) tablet 4 mg  4 mg Oral Daily Angi Oswald, APRN - CNP        HYDROcodone-acetaminophen (NORCO) 7.5-325 MG per tablet 1 tablet  1

## 2020-02-04 NOTE — ED PROVIDER NOTES
STENT PLACEMENT      ENDOSCOPY, COLON, DIAGNOSTIC  8/12/13    egd: normal    FRACTURE SURGERY      PROSTATE BIOPSY       History reviewed. No pertinent family history. Social History     Socioeconomic History    Marital status:       Spouse name: Not on file    Number of children: Not on file    Years of education: Not on file    Highest education level: Not on file   Occupational History    Not on file   Social Needs    Financial resource strain: Not on file    Food insecurity:     Worry: Not on file     Inability: Not on file    Transportation needs:     Medical: Not on file     Non-medical: Not on file   Tobacco Use    Smoking status: Never Smoker    Smokeless tobacco: Never Used   Substance and Sexual Activity    Alcohol use: No    Drug use: No    Sexual activity: Not on file   Lifestyle    Physical activity:     Days per week: Not on file     Minutes per session: Not on file    Stress: Not on file   Relationships    Social connections:     Talks on phone: Not on file     Gets together: Not on file     Attends [employer]ism service: Not on file     Active member of club or organization: Not on file     Attends meetings of clubs or organizations: Not on file     Relationship status: Not on file    Intimate partner violence:     Fear of current or ex partner: Not on file     Emotionally abused: Not on file     Physically abused: Not on file     Forced sexual activity: Not on file   Other Topics Concern    Not on file   Social History Narrative    Not on file     Current Facility-Administered Medications   Medication Dose Route Frequency Provider Last Rate Last Dose    pantoprazole (PROTONIX) 80 mg in sodium chloride 0.9 % 50 mL bolus  80 mg Intravenous Once Viridiana De La O, DO        pantoprazole (PROTONIX) 80 mg in sodium chloride 0.9 % 100 mL infusion  8 mg/hr Intravenous Continuous Viridiana De La O, DO        0.9 % sodium chloride bolus  20 mL Intravenous Once Viridiana HINOJOSA DO Jairon         Current Outpatient Medications   Medication Sig Dispense Refill    doxazosin (CARDURA) 4 MG tablet TAKE 1 TABLET BY MOUTH EVERY DAY  3    nebivolol (BYSTOLIC) 10 MG tablet Take by mouth daily      vitamin B-12 (CYANOCOBALAMIN) 1000 MCG tablet Take 1,000 mcg by mouth daily      simvastatin (ZOCOR) 10 MG tablet Take 10 mg by mouth nightly      anastrozole (ARIMIDEX) 1 MG tablet Take 1 mg by mouth daily      losartan-hydrochlorothiazide (HYZAAR) 50-12.5 MG per tablet   0    Magnesium 400 MG TABS Take by mouth      fluticasone (FLONASE) 50 MCG/ACT nasal spray 1 spray by Nasal route daily 1 Bottle 0    clopidogrel (PLAVIX) 75 MG tablet Take 75 mg by mouth daily.  ezetimibe (ZETIA) 10 MG tablet Take 10 mg by mouth daily.  esomeprazole Magnesium (NEXIUM) 40 MG PACK Take 40 mg by mouth daily.  lisinopril-hydrochlorothiazide (PRINZIDE;ZESTORETIC) 20-25 MG per tablet Take 1 tablet by mouth daily.  levothyroxine (SYNTHROID) 88 MCG tablet Take 88 mcg by mouth Daily.  potassium chloride (KLOR-CON) 20 MEQ packet Take 20 mEq by mouth daily.  traZODone (DESYREL) 25 mg TABS Take 25 mg by mouth nightly.  clorazepate (TRANXENE) 7.5 MG tablet Take 7.5 mg by mouth 2 times daily.  Cholecalciferol (VITAMIN D) 2000 UNITS CAPS capsule Take  by mouth daily.  aspirin 325 MG tablet Take 325 mg by mouth daily.  sertraline (ZOLOFT) 50 MG tablet Take 50 mg by mouth daily.  alendronate (FOSAMAX) 10 MG tablet Take 10 mg by mouth once a week.        Allergies   Allergen Reactions    Erythromycin     Pcn [Penicillins]        Nursing Notes Reviewed    Physical Exam:  Triage VS:    ED Triage Vitals [02/03/20 0904]   Enc Vitals Group      BP (!) 140/63      Pulse 82      Resp 18      Temp 98.9 °F (37.2 °C)      Temp Source Oral      SpO2 96 %      Weight 170 lb (77.1 kg)      Height 5' 11\" (1.803 m)      Head Circumference       Peak Flow       Pain Score       Pain Loc       Pain Edu? Excl. in [address]? Physical Exam  Vitals signs and nursing note reviewed. Constitutional:       Appearance: Normal appearance. HENT:      Head: Normocephalic and atraumatic. Comments: No lesions in mouth, teeth WNL     Nose: Nose normal.      Mouth/Throat:      Mouth: Mucous membranes are moist.   Eyes:      Comments: Pale conjunctiva   Cardiovascular:      Rate and Rhythm: Normal rate and regular rhythm. Pulmonary:      Effort: Pulmonary effort is normal. No respiratory distress. Breath sounds: Normal breath sounds. No wheezing or rhonchi. Abdominal:      General: Abdomen is flat. Bowel sounds are normal. There is no distension. Palpations: Abdomen is soft. Tenderness: There is no abdominal tenderness. There is no guarding or rebound. Genitourinary:     Rectum: Guaiac result positive. Comments: Guaiac positive, Bethany JUNG is chaperone. Musculoskeletal: Normal range of motion. Skin:     General: Skin is warm and dry. Capillary Refill: Capillary refill takes less than 2 seconds. Neurological:      Mental Status: He is alert and oriented to person, place, and time. Psychiatric:         Mood and Affect: Mood normal.         Thought Content:  Thought content normal.         Judgment: Judgment normal.              I have reviewed and interpreted all of the currently available lab results from this visit (if applicable):  Results for orders placed or performed during the hospital encounter of 02/03/20   Rapid Flu Swab   Result Value Ref Range    Rapid Influenza A Ag NEGATIVE NEGATIVE    Rapid Influenza B Ag NEGATIVE NEGATIVE   CBC Auto Differential   Result Value Ref Range    WBC 8.2 4.0 - 10.5 K/CU MM    RBC 2.13 (L) 4.6 - 6.2 M/CU MM    Hemoglobin (LL) 13.5 - 18.0 GM/DL     6.0  HGB CALLED TO   HGB CALLED TO [facility] DANYELL JUNG AT 1958 [id number] BY VIVIEN MLT  RESULTS READ BACK      Hematocrit 19.6 (L) 42 - 52 %    MCV 92.0 78 - 100 FL    MCH 28.2 27 - 31 obtained):    [] Radiologist's Report Reviewed:  CT LUMBAR SPINE WO CONTRAST   Final Result   1. No acute lumbar spine abnormality   2. Multilevel degenerative changes causing canal stenosis from L2-S1   3. Stable compression deformity T12         XR CHEST STANDARD (2 VW)   Final Result   No acute pulmonary disease. Calcific atherosclerotic disease aorta. EKG (if obtained): (All EKG's are interpreted by myself in the absence of a cardiologist)  Normal sinus rhythm, heart rate 77, , QRS 82, QTc 407, no acute ST elevation. Chart review shows recent radiographs:  Xr Chest Standard (2 Vw)    Result Date: 2/3/2020  EXAMINATION: TWO XRAY VIEWS OF THE CHEST 2/3/2020 7:13 pm COMPARISON: Chest 07/25/2010 HISTORY: ORDERING SYSTEM PROVIDED HISTORY: fatigue, jaw pain Acuity: Acute Type of Exam: Initial Additional signs and symptoms: back pain Relevant Medical/Surgical History: CAD FINDINGS: Calcifications involving the aorta reflect atherosclerosis. The cardiomediastinal and hilar silhouettes appear otherwise unremarkable. Senescent pulmonary changes. The lungs appear clear. No pleural fluid evident. No pneumothorax is seen. No acute osseous abnormality is identified. No acute pulmonary disease. Calcific atherosclerotic disease aorta. Ct Lumbar Spine Wo Contrast    Result Date: 2/3/2020  EXAMINATION: CT OF THE LUMBAR SPINE WITHOUT CONTRAST  2/3/2020 TECHNIQUE: CT of the lumbar spine was performed without the administration of intravenous contrast. Multiplanar reformatted images are provided for review. Dose modulation, iterative reconstruction, and/or weight based adjustment of the mA/kV was utilized to reduce the radiation dose to as low as reasonably achievable.  COMPARISON: None HISTORY: ORDERING SYSTEM PROVIDED HISTORY: pain, nki TECHNOLOGIST PROVIDED HISTORY: Reason for exam:->pain, nki Reason for Exam: back pain Acuity: Acute Type of Exam: Initial Additional signs and symptoms: Pt to ED

## 2020-02-04 NOTE — H&P
History and Physical  EVERTON Rodriguez-BC   Internal Medicine Hospitalist      Name:  Jane Younger /Age/Sex: 1935  (81 y.o. male)   MRN & CSN:  [id number] & [id number] Admission Date/Time: 2/3/2020  6:52 PM   Location:  ED14/ED-14 PCP: Kiersten Pruett MD       Hospital Day: 1      Supervising Physician: Dr. Jayce Reyes     Chief Complaint: Fatigue; Jaw Pain (left side upper and lower); and Back Pain     Assessment and Plan:   Jane Younger is a 81 y.o.  male who presents with Melena     Melena, suspected GI bleed, could be drug-induced - on ASA, Xarelto that started last 10/2019, denied abdominal pain, Hgb 6.0       - admit inpatient, telemetry monitoring         - Consult GI, Dr. Trinidad Padgett, known to pt        - Type & cross; 1 units PRBC Transfusion, started in ED       - post trans H & H, then q 6hrs       - will transfuse blood for Hgb <7, Transfuse accordingly       - Protonix 40 mg BID        - c/w MIVF       - hold ASA, Plavix, Xarelto        - NPO effective now, ice chips, sips with meds       - pending B12, Folate, iron panel, FOBT           OLIVER on CKD - presenting Crea 2.5 (on BL 2.1)       - continue IVF       - I/O monitoring       - hold Prinzide, Hyzaar       - avoid nephrotoxic agents       - monitor Crea level, CMP in AM       - consider nephro consult if there is no improvement         Back pain - CT lumbar no acute abnormality.       - PT/OT eval and treat       - pain control: prn tylenol, Norco       - fall precautions          Chronic Illnesses: will continue current home medications unless contraindicated by above plan and assessment. - CAD - c/w Zocor, Nebivolol, hold ASA for above condition.       - GERD - on PPI       - hyperlipidemia       - hypertension - stable, c/w BB, hold Prinzide, Hyzaar for now d/t OLIVER, monitor BP.       - thyroid disease - c/w synthroid.      Current diagnosis and plan of management discussed with the patient and family at the time changes in bowels, dysuria, and hematuria. Upon interview, the patient provided the history as above. ED Course: Discussed case with ED physician prior to admission. ROS: Ten point ROS reviewed and negative, unless as noted above per HPI. Objective:   No intake or output data in the 24 hours ending 02/03/20 2229     Vitals:   Vitals:    02/03/20 2010 02/03/20 2035 02/03/20 2105 02/03/20 2224   BP:  138/66 (!) 141/62 (P) 138/70   Pulse: 74 75 74 (P) 82   Resp: 12 14 19 (P) 17   Temp:    (P) 98.9 °F (37.2 °C)   TempSrc:    (P) Oral   SpO2:  96% 93% (P) 92%   Weight:       Height:         Physical Exam: 02/03/20    GEN  -Awake, alert, appearing Frail, elderly male, lying in bed, cooperative, able to give adequate history. Appears given age. EYES -Pupils are equally round. No vision changes. No scleral erythema, discharge, or conjunctivitis. HENT -MM are moist. Oral pharynx without exudates, no evidence of thrush. NECK -Supple, no apparent thyromegaly or masses. RESP -LS CTA equal bilat, no wheezes, rales or rhonchi. Symmetric chest movement. No respiratory distress noted. C/V  -S1/S2 auscultated. RRR without appreciable M/R/G. No JVD or carotid bruits. Peripheral pulses equal bilaterally and palpable. Peripheral pulses equal bilaterally and palpable. No peripheral edema. GI  -Abdomen is soft, round, non-distended, no significant tenderness. No masses or guarding. + BS in all quadrants. Rectal exam deferred.   -Corado catheter is not present. LYMPH  -No palpable cervical lymphadenopathy and no hepatosplenomegaly. No petechiae or ecchymoses. MS  -B/L extremities moderate muscles strength. Full movements. No gross joint deformities. No swelling, intact sensation symmetrical.   SKIN  -Normal coloration, warm, dry. No open wounds or ulcers. NEURO  -CN 2-12 appear grossly intact, normal speech, no lateralizing weakness. PSYC  -Awake, alert, oriented x 4. Appropriate affect.      Past Medical History:      Past Medical History:   Diagnosis Date    Arthritis     CAD (coronary artery disease)     GERD (gastroesophageal reflux disease)     Hyperlipidemia     Hypertension     Kidney cysts     Right kidney    Thyroid disease     Unspecified cerebral artery occlusion with cerebral infarction      Past Surgery History:  Patient  has a past surgical history that includes fracture surgery; Prostate biopsy; Coronary angioplasty with stent; Colonoscopy (8/12/13); and Endoscopy, colon, diagnostic (8/12/13). Social History:    FAM HX: Assessed: family history includes Alzheimer's Disease in his father; Bleeding Prob in his mother; Heart Failure in his mother; Kidney Disease in his mother. Soc HX:   Social History     Socioeconomic History    Marital status:      Spouse name: None    Number of children: None    Years of education: None    Highest education level: None   Occupational History    None   Social Needs    Financial resource strain: None    Food insecurity:     Worry: None     Inability: None    Transportation needs:     Medical: None     Non-medical: None   Tobacco Use    Smoking status: Never Smoker    Smokeless tobacco: Never Used   Substance and Sexual Activity    Alcohol use: No    Drug use: No    Sexual activity: None   Lifestyle    Physical activity:     Days per week: None     Minutes per session: None    Stress: None   Relationships    Social connections:     Talks on phone: None     Gets together: None     Attends Denominational service: None     Active member of club or organization: None     Attends meetings of clubs or organizations: None     Relationship status: None    Intimate partner violence:     Fear of current or ex partner: None     Emotionally abused: None     Physically abused: None     Forced sexual activity: None   Other Topics Concern    None   Social History Narrative    None     TOBACCO:   reports that he has never smoked.  He has never used smokeless without the administration of intravenous contrast. Multiplanar reformatted images are provided for review. Dose modulation, iterative reconstruction, and/or weight based adjustment of the mA/kV was utilized to reduce the radiation dose to as low as reasonably achievable. COMPARISON: None HISTORY: ORDERING SYSTEM PROVIDED HISTORY: pain, nki TECHNOLOGIST PROVIDED HISTORY: Reason for exam:->pain, nki Reason for Exam: back pain Acuity: Acute Type of Exam: Initial Additional signs and symptoms: Pt to ED for c/o fatigue, back pain, and left sided jaw pain since Thursday evening Relevant Medical/Surgical History: hx of back surgery no new inj FINDINGS: BONES/ALIGNMENT: Previous vertebral body enhancement L3, L4 and L5. Unchanged T12 compression deformity. No acute fracture. No destructive bony abnormality identified. DEGENERATIVE CHANGES: Multilevel degenerative disc disease and facet joint arthropathy. Disc bulging and posterior spurring combined with facet joint arthropathy at L2-L3, L3-L4, L4-L5 and L5-S1 causing canal stenosis. SOFT TISSUES/RETROPERITONEUM: No paraspinal mass is seen. Unchanged calcified cystic lesion right kidney. 1. No acute lumbar spine abnormality 2. Multilevel degenerative changes causing canal stenosis from L2-S1 3.  Stable compression deformity T12     EKG this visit:   EKG: Normal sinus rhythm, rate 77   Normal ECG   No previous ECGs available     Current Treatment Team:  Treatment Team: Attending Provider: [address] [place] [address]; Triage Provider: Bryce Bell PA-C; Registered Nurse: Shoaib Bruce RN; Consulting Physician: MD Maira Sellers APRN-BC [facility]  2/3/2020 10:29 PM      Electronically signed by EVERTON Baird CNP on 2/3/2020 at 10:29 PM

## 2020-02-04 NOTE — PLAN OF CARE
Problem: Falls - Risk of:  Goal: Will remain free from falls  Description  Will remain free from falls  Outcome: Ongoing  Goal: Absence of physical injury  Description  Absence of physical injury  Outcome: Ongoing     Problem: Pain:  Goal: Pain level will decrease  Description  Pain level will decrease  Outcome: Ongoing  Goal: Control of acute pain  Description  Control of acute pain  Outcome: Ongoing  Goal: Control of chronic pain  Description  Control of chronic pain  Outcome: Ongoing     Problem: Bleeding:  Goal: Will show no signs and symptoms of excessive bleeding  Description  Will show no signs and symptoms of excessive bleeding  Outcome: Ongoing

## 2020-02-05 LAB
ANION GAP SERPL CALCULATED.3IONS-SCNC: 14 MMOL/L (ref 4–16)
BUN BLDV-MCNC: 35 MG/DL (ref 6–23)
CALCIUM SERPL-MCNC: 8.3 MG/DL (ref 8.3–10.6)
CHLORIDE BLD-SCNC: 108 MMOL/L (ref 99–110)
CO2: 20 MMOL/L (ref 21–32)
CREAT SERPL-MCNC: 2.3 MG/DL (ref 0.9–1.3)
GFR AFRICAN AMERICAN: 33 ML/MIN/1.73M2
GFR NON-AFRICAN AMERICAN: 27 ML/MIN/1.73M2
GLUCOSE BLD-MCNC: 88 MG/DL (ref 70–99)
HCT VFR BLD CALC: 26.8 % (ref 42–52)
HEMOGLOBIN: 8.1 GM/DL (ref 13.5–18)
MCH RBC QN AUTO: 27.7 PG (ref 27–31)
MCHC RBC AUTO-ENTMCNC: 30.2 % (ref 32–36)
MCV RBC AUTO: 91.8 FL (ref 78–100)
PDW BLD-RTO: 15.9 % (ref 11.7–14.9)
PLATELET # BLD: 136 K/CU MM (ref 140–440)
PMV BLD AUTO: 9.8 FL (ref 7.5–11.1)
POTASSIUM SERPL-SCNC: 3.6 MMOL/L (ref 3.5–5.1)
RBC # BLD: 2.92 M/CU MM (ref 4.6–6.2)
SODIUM BLD-SCNC: 142 MMOL/L (ref 135–145)
WBC # BLD: 7.7 K/CU MM (ref 4–10.5)

## 2020-02-05 PROCEDURE — 2580000003 HC RX 258: Performed by: NURSE PRACTITIONER

## 2020-02-05 PROCEDURE — C9113 INJ PANTOPRAZOLE SODIUM, VIA: HCPCS | Performed by: NURSE PRACTITIONER

## 2020-02-05 PROCEDURE — 36415 COLL VENOUS BLD VENIPUNCTURE: CPT

## 2020-02-05 PROCEDURE — 80048 BASIC METABOLIC PNL TOTAL CA: CPT

## 2020-02-05 PROCEDURE — 6370000000 HC RX 637 (ALT 250 FOR IP): Performed by: NURSE PRACTITIONER

## 2020-02-05 PROCEDURE — 6360000002 HC RX W HCPCS: Performed by: NURSE PRACTITIONER

## 2020-02-05 PROCEDURE — 2060000000 HC ICU INTERMEDIATE R&B

## 2020-02-05 PROCEDURE — 85027 COMPLETE CBC AUTOMATED: CPT

## 2020-02-05 PROCEDURE — 97530 THERAPEUTIC ACTIVITIES: CPT

## 2020-02-05 PROCEDURE — 97116 GAIT TRAINING THERAPY: CPT

## 2020-02-05 RX ADMIN — LEVOTHYROXINE SODIUM 88 MCG: 88 TABLET ORAL at 05:41

## 2020-02-05 RX ADMIN — PANTOPRAZOLE SODIUM 40 MG: 40 INJECTION, POWDER, FOR SOLUTION INTRAVENOUS at 00:19

## 2020-02-05 RX ADMIN — SERTRALINE HYDROCHLORIDE 50 MG: 50 TABLET ORAL at 10:07

## 2020-02-05 RX ADMIN — DOXAZOSIN 4 MG: 4 TABLET ORAL at 10:06

## 2020-02-05 RX ADMIN — LORAZEPAM 1 MG: 1 TABLET ORAL at 20:51

## 2020-02-05 RX ADMIN — SODIUM CHLORIDE, PRESERVATIVE FREE 10 ML: 5 INJECTION INTRAVENOUS at 00:21

## 2020-02-05 RX ADMIN — NEBIVOLOL HYDROCHLORIDE 10 MG: 10 TABLET ORAL at 10:06

## 2020-02-05 RX ADMIN — Medication 400 MG: at 10:06

## 2020-02-05 RX ADMIN — SIMVASTATIN 10 MG: 10 TABLET, FILM COATED ORAL at 20:51

## 2020-02-05 RX ADMIN — PANTOPRAZOLE SODIUM 40 MG: 40 INJECTION, POWDER, FOR SOLUTION INTRAVENOUS at 14:19

## 2020-02-05 RX ADMIN — TRAZODONE HYDROCHLORIDE 25 MG: 50 TABLET ORAL at 20:51

## 2020-02-05 RX ADMIN — SODIUM CHLORIDE: 9 INJECTION, SOLUTION INTRAVENOUS at 18:29

## 2020-02-05 RX ADMIN — CYANOCOBALAMIN TAB 1000 MCG 1000 MCG: 1000 TAB at 10:07

## 2020-02-05 RX ADMIN — ANASTROZOLE 1 MG: 1 TABLET, COATED ORAL at 10:07

## 2020-02-05 RX ADMIN — SODIUM CHLORIDE, PRESERVATIVE FREE 10 ML: 5 INJECTION INTRAVENOUS at 10:07

## 2020-02-05 RX ADMIN — Medication 2000 UNITS: at 10:06

## 2020-02-05 RX ADMIN — SODIUM CHLORIDE: 9 INJECTION, SOLUTION INTRAVENOUS at 05:41

## 2020-02-05 RX ADMIN — LORAZEPAM 1 MG: 1 TABLET ORAL at 10:07

## 2020-02-05 RX ADMIN — SODIUM CHLORIDE, PRESERVATIVE FREE 10 ML: 5 INJECTION INTRAVENOUS at 00:15

## 2020-02-05 ASSESSMENT — PAIN SCALES - GENERAL
PAINLEVEL_OUTOF10: 0
PAINLEVEL_OUTOF10: 0

## 2020-02-05 NOTE — PROGRESS NOTES
Physical Therapy    Facility/Department: [facility] ICU STEPDOWN  Initial Assessment    NAME: Elizabeth Cerda  : 1935  MRN: [id number]    Date of Service: 2020    Discharge Recommendations:  IP Rehab        Assessment   Assessment: Pt is an 81 y.o. male with medical history, surgical history, co-morbidities, and personal factors including Arthritis, CAD (coronary artery disease), GERD (gastroesophageal reflux disease), Hyperlipidemia, Hypertension, Kidney cysts, Thyroid disease, Unspecified cerebral artery occlusion with cerebral infarction, fracture surgery; Prostate biopsy; Coronary angioplasty with stent; Colonoscopy (13); and Endoscopy, colon, diagnostic (13) with admission for Anemia and Gastrointestinal hemorrhage. Prior to admission, pt was independent with functional mobility and ADLs. Examination of body systems reveals decreased strength, decreased balance, decreased aerobic capacity, and decreased independence with functional mobility.          Prognosis: Good  Decision Making: High Complexity  Clinical Presentation: unpredictable characteristics  PT Education: Goals;Transfer Training;Equipment;PT Role;Functional Mobility Training;Plan of Care;General Safety;Gait Training  REQUIRES PT FOLLOW UP: Yes  Activity Tolerance  Activity Tolerance: Patient Tolerated treatment well       Restrictions  Restrictions/Precautions  Restrictions/Precautions: General Precautions, Fall Risk  Vision/Hearing  Vision: Within Functional Limits  Hearing: Within functional limits     Subjective  General  Chart Reviewed: Yes  Patient assessed for rehabilitation services?: Yes  Family / Caregiver Present: No  Follows Commands: Within Functional Limits  Pain Screening  Patient Currently in Pain: No  Pain Assessment  Pain Assessment: 0-10  Pain Level: 8  Pain Type: Chronic pain  Pain Location: Back  Pain Orientation: Lower  Vital Signs  Patient Currently in Pain: Yes       Orientation  Orientation  Overall Orientation

## 2020-02-05 NOTE — PROGRESS NOTES
with SBAx1  Long term goal 3: In one week, pt will ambulate 75 feet with SBAx1 with LRAD  Long term goal 4: In one week, pt will independently complete 3 sets of 10 reps of BLE AROM exercises in available and allowed ROM  Long term goal 5:  In one week, pt will ascend/descend 2 steps with a handrail with modAx1    Electronically signed by:      Samuella Cabot, PT, DPT  License #: [id number]

## 2020-02-06 ENCOUNTER — ANESTHESIA EVENT (OUTPATIENT)
Dept: ENDOSCOPY | Age: 85
DRG: 378 | End: 2020-02-06
Payer: MEDICARE

## 2020-02-06 ENCOUNTER — ANESTHESIA (OUTPATIENT)
Dept: ENDOSCOPY | Age: 85
DRG: 378 | End: 2020-02-06
Payer: MEDICARE

## 2020-02-06 VITALS — OXYGEN SATURATION: 100 % | DIASTOLIC BLOOD PRESSURE: 51 MMHG | SYSTOLIC BLOOD PRESSURE: 104 MMHG

## 2020-02-06 LAB
ALBUMIN SERPL-MCNC: 2.8 GM/DL (ref 3.4–5)
ALP BLD-CCNC: 67 IU/L (ref 40–128)
ALT SERPL-CCNC: 12 U/L (ref 10–40)
AMYLASE: 117 U/L (ref 25–115)
ANION GAP SERPL CALCULATED.3IONS-SCNC: 11 MMOL/L (ref 4–16)
APTT: 40.8 SECONDS (ref 25.1–37.1)
AST SERPL-CCNC: 17 IU/L (ref 15–37)
BILIRUB SERPL-MCNC: 0.3 MG/DL (ref 0–1)
BUN BLDV-MCNC: 30 MG/DL (ref 6–23)
CALCIUM SERPL-MCNC: 8.2 MG/DL (ref 8.3–10.6)
CHLORIDE BLD-SCNC: 106 MMOL/L (ref 99–110)
CO2: 24 MMOL/L (ref 21–32)
CREAT SERPL-MCNC: 1.9 MG/DL (ref 0.9–1.3)
GFR AFRICAN AMERICAN: 41 ML/MIN/1.73M2
GFR NON-AFRICAN AMERICAN: 34 ML/MIN/1.73M2
GLUCOSE BLD-MCNC: 91 MG/DL (ref 70–99)
HCT VFR BLD CALC: 23.2 % (ref 42–52)
HCT VFR BLD CALC: 26.1 % (ref 42–52)
HCT VFR BLD CALC: 26.8 % (ref 42–52)
HEMOCCULT SP1 STL QL: POSITIVE
HEMOGLOBIN: 7.1 GM/DL (ref 13.5–18)
HEMOGLOBIN: 7.9 GM/DL (ref 13.5–18)
HEMOGLOBIN: 8.3 GM/DL (ref 13.5–18)
INR BLD: 1.22 INDEX
LIPASE: 56 IU/L (ref 13–60)
MCH RBC QN AUTO: 27.8 PG (ref 27–31)
MCHC RBC AUTO-ENTMCNC: 30.6 % (ref 32–36)
MCV RBC AUTO: 91 FL (ref 78–100)
PDW BLD-RTO: 15.3 % (ref 11.7–14.9)
PLATELET # BLD: 126 K/CU MM (ref 140–440)
PMV BLD AUTO: 10.4 FL (ref 7.5–11.1)
POTASSIUM SERPL-SCNC: 3.7 MMOL/L (ref 3.5–5.1)
PROTHROMBIN TIME: 14.8 SECONDS (ref 11.7–14.5)
RBC # BLD: 2.55 M/CU MM (ref 4.6–6.2)
SODIUM BLD-SCNC: 141 MMOL/L (ref 135–145)
TOTAL PROTEIN: 5.7 GM/DL (ref 6.4–8.2)
WBC # BLD: 7.3 K/CU MM (ref 4–10.5)

## 2020-02-06 PROCEDURE — 97166 OT EVAL MOD COMPLEX 45 MIN: CPT

## 2020-02-06 PROCEDURE — 82150 ASSAY OF AMYLASE: CPT

## 2020-02-06 PROCEDURE — 85027 COMPLETE CBC AUTOMATED: CPT

## 2020-02-06 PROCEDURE — 83690 ASSAY OF LIPASE: CPT

## 2020-02-06 PROCEDURE — 94761 N-INVAS EAR/PLS OXIMETRY MLT: CPT

## 2020-02-06 PROCEDURE — 80053 COMPREHEN METABOLIC PANEL: CPT

## 2020-02-06 PROCEDURE — 85610 PROTHROMBIN TIME: CPT

## 2020-02-06 PROCEDURE — 2580000003 HC RX 258: Performed by: NURSE ANESTHETIST, CERTIFIED REGISTERED

## 2020-02-06 PROCEDURE — 6370000000 HC RX 637 (ALT 250 FOR IP): Performed by: NURSE PRACTITIONER

## 2020-02-06 PROCEDURE — 3609017100 HC EGD: Performed by: SPECIALIST

## 2020-02-06 PROCEDURE — 2060000000 HC ICU INTERMEDIATE R&B

## 2020-02-06 PROCEDURE — 2500000003 HC RX 250 WO HCPCS: Performed by: NURSE ANESTHETIST, CERTIFIED REGISTERED

## 2020-02-06 PROCEDURE — 2709999900 HC NON-CHARGEABLE SUPPLY: Performed by: SPECIALIST

## 2020-02-06 PROCEDURE — 0DJ08ZZ INSPECTION OF UPPER INTESTINAL TRACT, VIA NATURAL OR ARTIFICIAL OPENING ENDOSCOPIC: ICD-10-PCS | Performed by: SPECIALIST

## 2020-02-06 PROCEDURE — C9113 INJ PANTOPRAZOLE SODIUM, VIA: HCPCS | Performed by: NURSE PRACTITIONER

## 2020-02-06 PROCEDURE — 85014 HEMATOCRIT: CPT

## 2020-02-06 PROCEDURE — 3700000000 HC ANESTHESIA ATTENDED CARE: Performed by: SPECIALIST

## 2020-02-06 PROCEDURE — 97116 GAIT TRAINING THERAPY: CPT

## 2020-02-06 PROCEDURE — 36430 TRANSFUSION BLD/BLD COMPNT: CPT

## 2020-02-06 PROCEDURE — P9016 RBC LEUKOCYTES REDUCED: HCPCS

## 2020-02-06 PROCEDURE — 85730 THROMBOPLASTIN TIME PARTIAL: CPT

## 2020-02-06 PROCEDURE — 97535 SELF CARE MNGMENT TRAINING: CPT

## 2020-02-06 PROCEDURE — 6360000002 HC RX W HCPCS: Performed by: NURSE PRACTITIONER

## 2020-02-06 PROCEDURE — 97530 THERAPEUTIC ACTIVITIES: CPT

## 2020-02-06 PROCEDURE — 6360000002 HC RX W HCPCS: Performed by: NURSE ANESTHETIST, CERTIFIED REGISTERED

## 2020-02-06 PROCEDURE — G0328 FECAL BLOOD SCRN IMMUNOASSAY: HCPCS

## 2020-02-06 PROCEDURE — 3700000001 HC ADD 15 MINUTES (ANESTHESIA): Performed by: SPECIALIST

## 2020-02-06 PROCEDURE — 36415 COLL VENOUS BLD VENIPUNCTURE: CPT

## 2020-02-06 PROCEDURE — 2580000003 HC RX 258: Performed by: NURSE PRACTITIONER

## 2020-02-06 PROCEDURE — 85018 HEMOGLOBIN: CPT

## 2020-02-06 RX ORDER — SODIUM CHLORIDE 9 MG/ML
10 INJECTION INTRAVENOUS DAILY
Status: DISCONTINUED | OUTPATIENT
Start: 2020-02-07 | End: 2020-02-09 | Stop reason: HOSPADM

## 2020-02-06 RX ORDER — 0.9 % SODIUM CHLORIDE 0.9 %
20 INTRAVENOUS SOLUTION INTRAVENOUS ONCE
Status: DISCONTINUED | OUTPATIENT
Start: 2020-02-06 | End: 2020-02-09 | Stop reason: HOSPADM

## 2020-02-06 RX ORDER — SODIUM CHLORIDE, SODIUM LACTATE, POTASSIUM CHLORIDE, CALCIUM CHLORIDE 600; 310; 30; 20 MG/100ML; MG/100ML; MG/100ML; MG/100ML
INJECTION, SOLUTION INTRAVENOUS CONTINUOUS PRN
Status: DISCONTINUED | OUTPATIENT
Start: 2020-02-06 | End: 2020-02-06 | Stop reason: SDUPTHER

## 2020-02-06 RX ORDER — PROPOFOL 10 MG/ML
INJECTION, EMULSION INTRAVENOUS PRN
Status: DISCONTINUED | OUTPATIENT
Start: 2020-02-06 | End: 2020-02-06 | Stop reason: SDUPTHER

## 2020-02-06 RX ORDER — PANTOPRAZOLE SODIUM 40 MG/10ML
40 INJECTION, POWDER, LYOPHILIZED, FOR SOLUTION INTRAVENOUS DAILY
Status: DISCONTINUED | OUTPATIENT
Start: 2020-02-07 | End: 2020-02-09 | Stop reason: HOSPADM

## 2020-02-06 RX ORDER — LIDOCAINE HYDROCHLORIDE 20 MG/ML
INJECTION, SOLUTION EPIDURAL; INFILTRATION; INTRACAUDAL; PERINEURAL PRN
Status: DISCONTINUED | OUTPATIENT
Start: 2020-02-06 | End: 2020-02-06 | Stop reason: SDUPTHER

## 2020-02-06 RX ADMIN — LEVOTHYROXINE SODIUM 88 MCG: 88 TABLET ORAL at 07:00

## 2020-02-06 RX ADMIN — Medication 2000 UNITS: at 09:36

## 2020-02-06 RX ADMIN — SODIUM CHLORIDE, POTASSIUM CHLORIDE, SODIUM LACTATE AND CALCIUM CHLORIDE: 600; 310; 30; 20 INJECTION, SOLUTION INTRAVENOUS at 11:40

## 2020-02-06 RX ADMIN — LORAZEPAM 1 MG: 1 TABLET ORAL at 21:30

## 2020-02-06 RX ADMIN — PROPOFOL 60 MG: 10 INJECTION, EMULSION INTRAVENOUS at 11:41

## 2020-02-06 RX ADMIN — SODIUM CHLORIDE, PRESERVATIVE FREE 10 ML: 5 INJECTION INTRAVENOUS at 09:30

## 2020-02-06 RX ADMIN — PANTOPRAZOLE SODIUM 40 MG: 40 INJECTION, POWDER, FOR SOLUTION INTRAVENOUS at 00:48

## 2020-02-06 RX ADMIN — SODIUM CHLORIDE: 9 INJECTION, SOLUTION INTRAVENOUS at 23:53

## 2020-02-06 RX ADMIN — CYANOCOBALAMIN TAB 1000 MCG 1000 MCG: 1000 TAB at 09:36

## 2020-02-06 RX ADMIN — SERTRALINE HYDROCHLORIDE 50 MG: 50 TABLET ORAL at 09:36

## 2020-02-06 RX ADMIN — SIMVASTATIN 10 MG: 10 TABLET, FILM COATED ORAL at 21:29

## 2020-02-06 RX ADMIN — PROPOFOL 20 MG: 10 INJECTION, EMULSION INTRAVENOUS at 11:43

## 2020-02-06 RX ADMIN — DOXAZOSIN 4 MG: 4 TABLET ORAL at 09:34

## 2020-02-06 RX ADMIN — SODIUM CHLORIDE: 9 INJECTION, SOLUTION INTRAVENOUS at 09:40

## 2020-02-06 RX ADMIN — ANASTROZOLE 1 MG: 1 TABLET, COATED ORAL at 09:33

## 2020-02-06 RX ADMIN — TRAZODONE HYDROCHLORIDE 25 MG: 50 TABLET ORAL at 21:30

## 2020-02-06 RX ADMIN — LORAZEPAM 1 MG: 1 TABLET ORAL at 09:36

## 2020-02-06 RX ADMIN — LIDOCAINE HYDROCHLORIDE 60 MG: 20 INJECTION, SOLUTION EPIDURAL; INFILTRATION; INTRACAUDAL; PERINEURAL at 11:41

## 2020-02-06 RX ADMIN — NEBIVOLOL HYDROCHLORIDE 10 MG: 10 TABLET ORAL at 09:34

## 2020-02-06 RX ADMIN — SODIUM CHLORIDE, PRESERVATIVE FREE 10 ML: 5 INJECTION INTRAVENOUS at 00:49

## 2020-02-06 RX ADMIN — Medication 400 MG: at 09:34

## 2020-02-06 ASSESSMENT — PAIN DESCRIPTION - PROGRESSION
CLINICAL_PROGRESSION: NOT CHANGED
CLINICAL_PROGRESSION: NOT CHANGED

## 2020-02-06 ASSESSMENT — PAIN SCALES - GENERAL
PAINLEVEL_OUTOF10: 3
PAINLEVEL_OUTOF10: 6
PAINLEVEL_OUTOF10: 5
PAINLEVEL_OUTOF10: 0

## 2020-02-06 ASSESSMENT — PAIN DESCRIPTION - DESCRIPTORS
DESCRIPTORS: DULL
DESCRIPTORS: ACHING;DULL
DESCRIPTORS: ACHING

## 2020-02-06 ASSESSMENT — PAIN DESCRIPTION - LOCATION
LOCATION: BACK
LOCATION: JAW
LOCATION: JAW

## 2020-02-06 ASSESSMENT — PAIN DESCRIPTION - PAIN TYPE
TYPE: ACUTE PAIN;OTHER (COMMENT)
TYPE: CHRONIC PAIN
TYPE: ACUTE PAIN

## 2020-02-06 ASSESSMENT — PAIN DESCRIPTION - ORIENTATION
ORIENTATION: LOWER
ORIENTATION: LEFT
ORIENTATION: LEFT

## 2020-02-06 ASSESSMENT — PAIN - FUNCTIONAL ASSESSMENT
PAIN_FUNCTIONAL_ASSESSMENT: PREVENTS OR INTERFERES SOME ACTIVE ACTIVITIES AND ADLS
PAIN_FUNCTIONAL_ASSESSMENT: PREVENTS OR INTERFERES SOME ACTIVE ACTIVITIES AND ADLS

## 2020-02-06 ASSESSMENT — PAIN DESCRIPTION - FREQUENCY
FREQUENCY: INTERMITTENT
FREQUENCY: CONTINUOUS

## 2020-02-06 ASSESSMENT — PAIN DESCRIPTION - ONSET
ONSET: ON-GOING
ONSET: ON-GOING

## 2020-02-06 NOTE — PROGRESS NOTES
[facility] ACUTE CARE OCCUPATIONAL THERAPY EVALUATION    Jacob Herrmann, 1935, 2028/2028-A, 2/6/2020    Discharge Recommendation: Home with initial 24 hour supervision/assistance      History:  [place]:  The primary encounter diagnosis was Anemia, unspecified type. A diagnosis of Gastrointestinal hemorrhage, unspecified gastrointestinal hemorrhage type was also pertinent to this visit. Subjective:  Patient states: \"I got up and walked to the bathroom a little while ago. \"  Pain: Pt reported 3/10 Low back pain   Communication with other providers: RN  Restrictions: General Precautions, Fall Risk, Telemetry, IV    Home Setup/Prior level of function:  Social/Functional History  Lives With: Alone  Type of Home: House  Home Layout: Bed/Bath upstairs (12 steps with a handrail to access bed/bath, 1/2 bath on first floor)  Home Access: Stairs to enter with rails  Entrance Stairs - Number of Steps: 2  Bathroom Shower/Tub: Tub/Shower unit  Bathroom Toilet: Standard  Bathroom Equipment: Shower chair  Home Equipment: Melody [employer], Electric scooter (cane in the house, RW in community )  ADL Assistance: Independent  Homemaking Assistance: Independent  Ambulation Assistance: Independent (mod I with rolling walker)  Transfer Assistance: Independent  Active : Yes  Occupation: Retired  Type of occupation: [employer]  Leisure & Hobbies: fixing things    Examination:  · Observation: Supine in bed upon arrival. Daughter and JEANETTE present. Pt pleasant and agreeable to OT evaluation. · Vision: WFL (Glasses)   · Hearing: [facility]  · Vitals: Stable vitals throughout session    Body Systems and functions:  · ROM: WFL BL UEs  · Strength: 4+/5 MMT BL UEs all major muscle groups.    · Sensation: WFL (Pt denied numbness or tingling in hands)   · Tone: Normal  · Coordination: WFL  · Perception: WNL    Activities of Daily Living (ADLs):  · Feeding: Independent  · Grooming: Supervision (Pt performed oral hygiene task performed LB dressing. Pt performed oral hygiene task of brushing standing at sink. Pt performed grooming task of washing face standing at sink. Pt educated on role of OT, POC, and importance of OOB activity. Safety Measures: Gait belt used, Left in wheelchair with RN to be transported for procedure    Assessment:  Pt is a 81year old male with a past medical history of Arthritis, CAD (coronary artery disease), GERD (gastroesophageal reflux disease), Hyperlipidemia, Hypertension, Kidney cysts, Thyroid disease, and Unspecified cerebral artery occlusion with cerebral infarction. Pt admitted with low back pain and fatigue. Pt dx with GI bleed and OLIVER. Pt lives alone in 2 story home with bed/bath on 2nd floor. Prior to admission Pt was independent in all ADLs, IADLs, ambulates at home with a cane and RW in the community (electric scooter for long distances) and drive. Pt appears to be functioning near baseline and is safe from a self-care and mobility stand point to return home with initial 24/7 supervision/assistance PRN. Complexity: Moderate  Prognosis: Good  Plan: 2x/week      Goals:  1. Pt will complete all aspects of bed mobility for EOB/OOB ADLs independent with HOB flat  2. Pt will complete UB/LB bathing SBA seated  3. Pt will complete all aspects of LB dressing SBA   4. Pt will complete all functional transfers to and from bed, chair, toilet, shower chair SBA with good safety awareness (hand placement)   5. Pt will ambulate HH distance to bathroom for toileting SBA with RW and safe management of RW  6. Pt will complete all aspects of toileting task SBA   7. Pt will complete oral hygiene/grooming routine in standing at sink SBA   8.  Pt will complete ther ex/ther act with focus on functional activity tolerance and safety awareness with RW        Time:   Time in: 1058  Time out: 1125  Timed treatment minutes: 15  Total time: 27      Electronically signed by:  NOEL Scott/NEVAEH Caldwell OTR/L, [place], .[id number]

## 2020-02-06 NOTE — PROGRESS NOTES
cues for BLE placement, walker placement, and sequence throughout ambulation. Pt also provided with verbal and tactile cues to maintain upright posture in order to avoid COM shifting outside of KESHIA. Pt provided with verbal cues for directions in order to successfully navigate hallway and return to correct room. Safety  Patient left safely in the chair, with call light/phone in reach with alarm applied. Gait belt was used for transfers and gait. Assessment / Impression:       Patient's tolerance of treatment:  Good; patient tolerated increased ambulation distance today  Adverse Reaction: none  Significant change in status and impact:  none  Barriers to improvement:  Decreased endurance    Plan for Next Session:    Continue progressing toward goals per plan of care. Progress independence with transfers and ambulation as tolerated and appropriate. Progress ambulation distance as tolerated and appropriate. Time in:  1230  Time out:  1324  Timed treatment minutes:  54  Total treatment time:  47    Previously filed items:  Social/Functional History  Lives With: Alone  Type of Home: House  Home Layout: Bed/Bath upstairs(12 steps with a handrail to access bed/bath)  Home Access: Stairs to enter with rails  Entrance Stairs - Number of Steps: 2  Bathroom Shower/Tub: Tub/Shower unit  Bathroom Toilet: Standard  Bathroom Equipment: Shower chair  Home Equipment: [employer] scooter(cane in the house, RW in community )  ADL Assistance: Independent  Homemaking Assistance: Independent  Ambulation Assistance: Independent(mod I with rolling walker)  Transfer Assistance: Independent  Active : Yes  Occupation: Retired  Type of occupation: [employer]  Leisure & Hobbies: fixing things     Long term goals  Long term goal 1: In one week, pt will complete all bed mobility with SBAx1  Long term goal 2: In one week, pt will complete sit <> stand transfers with SBAx1  Long term goal 3:  In one week, pt will ambulate 75 feet with SBAx1 with LRAD  Long term goal 4: In one week, pt will independently complete 3 sets of 10 reps of BLE AROM exercises in available and allowed ROM  Long term goal 5:  In one week, pt will ascend/descend 2 steps with a handrail with modAx1    Electronically signed by:    Carlton Quach PT, DPT  License #: [id number]

## 2020-02-06 NOTE — PROGRESS NOTES
10 mL Intravenous Daily    sodium chloride flush  10 mL Intravenous 2 times per day    fluticasone  1 spray Nasal Daily    vitamin D  2 capsule Oral Daily    anastrozole  1 mg Oral Daily    vitamin B-12  1,000 mcg Oral Daily    traZODone  25 mg Oral Nightly    simvastatin  10 mg Oral Nightly    sertraline  50 mg Oral Daily    nebivolol  10 mg Oral Daily    levothyroxine  88 mcg Oral Daily    doxazosin  4 mg Oral Daily    magnesium oxide  400 mg Oral Daily    LORazepam  1 mg Oral BID      Infusions:    sodium chloride 75 mL/hr at 02/06/20 0940     PRN Meds: sodium chloride flush, 10 mL, PRN  acetaminophen, 650 mg, Q4H PRN  ondansetron, 4 mg, Q6H PRN  HYDROcodone-acetaminophen, 1 tablet, Q6H PRN            Pertinent New Labs & Imaging Studies     CBC:   Lab Results   Component Value Date    WBC 7.3 02/06/2020    RBC 2.55 02/06/2020    HGB 7.1 02/06/2020    HCT 23.2 02/06/2020    MCV 91.0 02/06/2020    MCH 27.8 02/06/2020    MCHC 30.6 02/06/2020    RDW 15.3 02/06/2020     02/06/2020    MPV 10.4 02/06/2020     BMP:    Lab Results   Component Value Date     02/06/2020    K 3.7 02/06/2020     02/06/2020    CO2 24 02/06/2020    BUN 30 02/06/2020    LABALBU 2.8 02/06/2020    CREATININE 1.9 02/06/2020    CALCIUM 8.2 02/06/2020    GFRAA 41 02/06/2020    LABGLOM 34 02/06/2020    GLUCOSE 91 02/06/2020     Xr Chest Standard (2 Vw)    Result Date: 2/3/2020  EXAMINATION: TWO XRAY VIEWS OF THE CHEST 2/3/2020 7:13 pm COMPARISON: Chest 07/25/2010 HISTORY: ORDERING SYSTEM PROVIDED HISTORY: fatigue, jaw pain Acuity: Acute Type of Exam: Initial Additional signs and symptoms: back pain Relevant Medical/Surgical History: CAD FINDINGS: Calcifications involving the aorta reflect atherosclerosis. The cardiomediastinal and hilar silhouettes appear otherwise unremarkable. Senescent pulmonary changes. The lungs appear clear. No pleural fluid evident. No pneumothorax is seen.  No acute osseous abnormality is up with colonoscopy    OLIVER on CKD stage IIIb-resolved  Creatinine improved, back to baseline  Continue gentle hydration  BP is borderline, continue to hold ACEI/ARB  Will consider nephrology consult if start to get worse    CAD   Continue Zocor and Nebivolol  Holding antiplatelets    Hypothyroidism  Continue Synthroid    Depression/Anxiety  Continue Trazadone, Ativan and Zoloft     Hypertension  Continue Cardura, Nebivolol    Will resume anticoagulants when okay with GI.   Diet DIET GENERAL;   DVT Prophylaxis [] Lovenox, []  Heparin, [x] SCDs, [] Ambulation   GI Prophylaxis [x] PPI,  [] H2 Blocker,  [] Carafate,  [] Diet/Tube Feeds   Code Status Full Code   Disposition Patient requires continued admission due to Blood loss anemia from GI bleed   MDM [] Low, [x] Moderate,[]  High     Electronically signed by Esau Lefort, MD on 2/6/2020 at 2:04 PM

## 2020-02-06 NOTE — BRIEF OP NOTE
Brief Postoperative Note      Deana Castro is a 81 y.o. male     Pre-operative Diagnosis:  ANEMIA  / GIT BLEEDING    Post-operative Diagnosis:NORMAL EGD NO BLEEDING LESION NOTED    Procedure: EGD    Anesthesia: MAC    Surgeons/Assistants:Smita Clark     Estimated Blood Loss: NONE    Complications: None    Specimens: were not obtained      Smita Clark   2/6/2020   11:57 AM

## 2020-02-06 NOTE — PROGRESS NOTES
Assessment completed, vital signs obtained and medication administered per order. Patient is in bed afebrile, alert and oriented x4 with complaints of mild back pain. Warm blanket applied and patient expressed relief.

## 2020-02-06 NOTE — ANESTHESIA PRE PROCEDURE
Department of Anesthesiology  Preprocedure Note       Name:  Maribeth Garcia   Age:  81 y.o.  :  1935                                          MRN:  [id number]         Date:  2020      Surgeon: Jose Varela):  Ellen Bond MD    Procedure: EGD ESOPHAGOGASTRODUODENOSCOPY (N/A )    Medications prior to admission:   Prior to Admission medications    Medication Sig Start Date End Date Taking? Authorizing Provider   doxazosin (CARDURA) 4 MG tablet TAKE 1 TABLET BY MOUTH EVERY DAY 10/1/19   Historical Provider, MD   nebivolol (BYSTOLIC) 10 MG tablet Take by mouth daily    Historical Provider, MD   vitamin B-12 (CYANOCOBALAMIN) 1000 MCG tablet Take 1,000 mcg by mouth daily    Historical Provider, MD   simvastatin (ZOCOR) 10 MG tablet Take 10 mg by mouth nightly    Historical Provider, MD   anastrozole (ARIMIDEX) 1 MG tablet Take 1 mg by mouth daily    Historical Provider, MD   losartan-hydrochlorothiazide (HYZAAR) 50-12.5 MG per tablet  10/27/19   Historical Provider, MD   Magnesium 400 MG TABS Take by mouth    Historical Provider, MD   fluticasone (FLONASE) 50 MCG/ACT nasal spray 1 spray by Nasal route daily 12/3/19   EVERTON Mckeon - CNP   clopidogrel (PLAVIX) 75 MG tablet Take 75 mg by mouth daily. Historical Provider, MD   ezetimibe (ZETIA) 10 MG tablet Take 10 mg by mouth daily. Historical Provider, MD   esomeprazole Magnesium (NEXIUM) 40 MG PACK Take 40 mg by mouth daily. Historical Provider, MD   lisinopril-hydrochlorothiazide (PRINZIDE;ZESTORETIC) 20-25 MG per tablet Take 1 tablet by mouth daily. Historical Provider, MD   levothyroxine (SYNTHROID) 88 MCG tablet Take 88 mcg by mouth Daily. Historical Provider, MD   potassium chloride (KLOR-CON) 20 MEQ packet Take 20 mEq by mouth daily. Historical Provider, MD   traZODone (DESYREL) 25 mg TABS Take 25 mg by mouth nightly. Historical Provider, MD   clorazepate (TRANXENE) 7.5 MG tablet Take 7.5 mg by mouth 2 times daily.     Historical Provider, MD   Cholecalciferol (VITAMIN D) 2000 UNITS CAPS capsule Take  by mouth daily. Historical Provider, MD   aspirin 325 MG tablet Take 325 mg by mouth daily. Historical Provider, MD   sertraline (ZOLOFT) 50 MG tablet Take 50 mg by mouth daily. Historical Provider, MD   alendronate (FOSAMAX) 10 MG tablet Take 10 mg by mouth once a week.     Historical Provider, MD       Current medications:    Current Facility-Administered Medications   Medication Dose Route Frequency Provider Last Rate Last Dose    0.9 % sodium chloride bolus  20 mL Intravenous Once Jayro Kowalski MD        sodium chloride flush 0.9 % injection 10 mL  10 mL Intravenous 2 times per day Pal Actis, APRN - CNP   10 mL at 02/06/20 0930    sodium chloride flush 0.9 % injection 10 mL  10 mL Intravenous PRN Pal Actis, APRN - CNP   10 mL at 02/05/20 0021    acetaminophen (TYLENOL) tablet 650 mg  650 mg Oral Q4H PRN Pal Actis, APRN - CNP        ondansetron (ZOFRAN) injection 4 mg  4 mg Intravenous Q6H PRN Angi Oswald, APRN - CNP        0.9 % sodium chloride infusion   Intravenous Continuous Pal Actis, APRN - CNP 75 mL/hr at 02/06/20 0940      pantoprazole (PROTONIX) injection 40 mg  40 mg Intravenous Q12H Angi Oswald, APRN - CNP   40 mg at 02/06/20 0048    And    sodium chloride (PF) 0.9 % injection 10 mL  10 mL Intravenous Q12H Angi Oswald, APRN - CNP   10 mL at 02/06/20 0049    fluticasone (FLONASE) 50 MCG/ACT nasal spray 1 spray  1 spray Nasal Daily Pal Actis, APRN - CNP   Stopped at 02/06/20 0945    vitamin D CAPS 2,000 Units  2 capsule Oral Daily Angi Oswald, APRN - CNP   2,000 Units at 02/06/20 0936    anastrozole (ARIMIDEX) tablet 1 mg  1 mg Oral Daily Pal Actis, APRN - CNP   1 mg at 02/06/20 0933    vitamin B-12 (CYANOCOBALAMIN) tablet 1,000 mcg  1,000 mcg Oral Daily Pal Actis, APRN - CNP   1,000 mcg at 02/06/20 0936    traZODone (DESYREL) tablet 25 mg  25 mg Oral Nightly Angi given: Not Answered      Vital Signs (Current):   Vitals:    02/05/20 2331 02/06/20 0411 02/06/20 0413 02/06/20 0925   BP: 112/84  (!) 121/58 (!) 139/59   Pulse: 63  57 54   Resp: 20 18 16   Temp: 36.8 °C (98.3 °F)  36.8 °C (98.2 °F) 36.7 °C (98 °F)   TempSrc: Oral  Oral Oral   SpO2: 95%  92% 97%   Weight:  170 lb 3.1 oz (77.2 kg)     Height:                                                  BP Readings from Last 3 Encounters:   02/06/20 (!) 139/59   12/03/19 (!) 140/70   08/12/13 145/69       NPO Status: Time of last liquid consumption: 0000                        Time of last solid consumption: 0000                        Date of last liquid consumption: 02/06/20                        Date of last solid food consumption: 02/06/20    BMI:   Wt Readings from Last 3 Encounters:   02/06/20 170 lb 3.1 oz (77.2 kg)   12/03/19 172 lb (78 kg)   08/12/13 168 lb (76.2 kg)     Body mass index is 23.74 kg/m². CBC:   Lab Results   Component Value Date    WBC 7.3 02/06/2020    RBC 2.55 02/06/2020    HGB 7.1 02/06/2020    HCT 23.2 02/06/2020    MCV 91.0 02/06/2020    RDW 15.3 02/06/2020     02/06/2020       CMP:   Lab Results   Component Value Date     02/06/2020    K 3.7 02/06/2020     02/06/2020    CO2 24 02/06/2020    BUN 30 02/06/2020    CREATININE 1.9 02/06/2020    GFRAA 41 02/06/2020    LABGLOM 34 02/06/2020    GLUCOSE 91 02/06/2020    PROT 5.7 02/06/2020    PROT 7.4 10/27/2011    CALCIUM 8.2 02/06/2020    BILITOT 0.3 02/06/2020    ALKPHOS 67 02/06/2020    AST 17 02/06/2020    ALT 12 02/06/2020       POC Tests: No results for input(s): POCGLU, POCNA, POCK, POCCL, POCBUN, POCHEMO, POCHCT in the last 72 hours.     Coags:   Lab Results   Component Value Date    PROTIME 14.8 02/06/2020    INR 1.22 02/06/2020    APTT 40.8 02/06/2020       HCG (If Applicable): No results found for: PREGTESTUR, PREGSERUM, HCG, HCGQUANT     ABGs: No results found for: PHART, PO2ART, MRB1SFX, HQA9LLE, BEART, V3VOLJOH     Type & Screen (If Applicable):  No results found for: LABABO, [address]    Anesthesia Evaluation  Patient summary reviewed no history of anesthetic complications:   Airway: Mallampati: III       Mouth opening: > = 3 FB Dental:          Pulmonary:                              Cardiovascular:    (+) hypertension:, CAD:,                   Neuro/Psych:   (+) CVA: residual symptoms,              ROS comment: Right eyelid and right arm weaker  GI/Hepatic/Renal:   (+) GERD:, morbid obesity          Endo/Other:                     Abdominal:           Vascular:                                        Anesthesia Plan      MAC     ASA 4       Induction: intravenous. Anesthetic plan and risks discussed with patient.                       Tr Naranjo, APRN - CRNA   2/6/2020

## 2020-02-06 NOTE — CONSULTS
[address], [address] [facility]                                  CONSULTATION    PATIENT NAME: Dallin Miranda                      :        1935  MED REC NO:   [id number]                          ROOM:         ACCOUNT NO:   [de-identified]                           ADMIT DATE: 2020  PROVIDER:     Effie Erickson MD    CONSULT DATE:  2020    PRIMARY CARE PROVIDER:  Rayann Phoenix, MD    CHIEF COMPLAINT:  History of severe anemia with \"dark colored stools,\"  rule out upper GI bleeding. HISTORY OF PRESENT ILLNESS:  The patient is an 81-year-old formerly [facility]  American gentleman with past medical history significant for  hypertension, coronary artery disease status post PTCA with coronary  stents in place; on aspirin and Xarelto, hyperlipidemia,  gastroesophageal reflux disease, osteoarthritis, kidney disease, thyroid  disease, who presented to the emergency room on 2020 with a 4-day  history of \"darkish stools\" along with generalized weakness, fatigue and  shortness of breath upon exertion. In the emergency room, the patient  had a CBC drawn which showed hemoglobin of 6 gm%. The patient has been  transfused with 2 units of packed RBC. Repeat hemoglobin is 8.1. The  patient is on IV Protonix. The patient denies abdominal pain, nausea, vomiting, hematemesis,  anorexia, or weight loss. The patient has had 2 or 3 colonoscopies done  and the last EGD and colonoscopy was done by Dr. Perry Wayne on 2013. The patient denies prior upper GI bleeding episode. The patient is  hemodynamically stable. REVIEW OF SYSTEMS:  CENTRAL NERVOUS SYSTEM:  The patient denies headache or focal  sensorimotor symptoms. CARDIOVASCULAR:  No history of chest pain, but the patient complains of  shortness of breath, but no leg swelling. GENITOURINARY:  No history of dysuria, pyuria, or hematuria.   MUSCULOSKELETAL:  The patient and H and transfuse on a p.r.n.  basis to keep hemoglobin above 7 gm%. 3.  We will proceed with EGD in the a.m.  4.  If the EGD is negative, the patient will need a colonoscopy and  small bowel evaluation as well. 5.  The case and plan has been discussed in detail with the patient.         Laura Nieto MD    D: 02/05/2020 15:47:07       T: 02/05/2020 18:38:42     [email or web address]  Job#: [id number]     Doc#: [id number]    CC:  Tito Houser MD

## 2020-02-07 ENCOUNTER — ANESTHESIA EVENT (OUTPATIENT)
Dept: ENDOSCOPY | Age: 85
DRG: 378 | End: 2020-02-07
Payer: MEDICARE

## 2020-02-07 ENCOUNTER — APPOINTMENT (OUTPATIENT)
Dept: CT IMAGING | Age: 85
DRG: 378 | End: 2020-02-07
Payer: MEDICARE

## 2020-02-07 LAB
ABO/RH: NORMAL
ALBUMIN SERPL-MCNC: 2.9 GM/DL (ref 3.4–5)
ALP BLD-CCNC: 75 IU/L (ref 40–128)
ALT SERPL-CCNC: 15 U/L (ref 10–40)
ANION GAP SERPL CALCULATED.3IONS-SCNC: 13 MMOL/L (ref 4–16)
ANTIBODY SCREEN: NEGATIVE
AST SERPL-CCNC: 21 IU/L (ref 15–37)
BILIRUB SERPL-MCNC: 0.3 MG/DL (ref 0–1)
BUN BLDV-MCNC: 22 MG/DL (ref 6–23)
CALCIUM SERPL-MCNC: 8.2 MG/DL (ref 8.3–10.6)
CHLORIDE BLD-SCNC: 107 MMOL/L (ref 99–110)
CO2: 20 MMOL/L (ref 21–32)
COMPONENT: NORMAL
CREAT SERPL-MCNC: 1.7 MG/DL (ref 0.9–1.3)
CROSSMATCH RESULT: NORMAL
GFR AFRICAN AMERICAN: 47 ML/MIN/1.73M2
GFR NON-AFRICAN AMERICAN: 39 ML/MIN/1.73M2
GLUCOSE BLD-MCNC: 102 MG/DL (ref 70–99)
HCT VFR BLD CALC: 26.4 % (ref 42–52)
HEMOGLOBIN: 8.3 GM/DL (ref 13.5–18)
MCH RBC QN AUTO: 27.4 PG (ref 27–31)
MCHC RBC AUTO-ENTMCNC: 31.4 % (ref 32–36)
MCV RBC AUTO: 87.1 FL (ref 78–100)
PDW BLD-RTO: 15.9 % (ref 11.7–14.9)
PLATELET # BLD: 126 K/CU MM (ref 140–440)
PMV BLD AUTO: 9.6 FL (ref 7.5–11.1)
POTASSIUM SERPL-SCNC: 3.5 MMOL/L (ref 3.5–5.1)
RBC # BLD: 3.03 M/CU MM (ref 4.6–6.2)
SODIUM BLD-SCNC: 140 MMOL/L (ref 135–145)
STATUS: NORMAL
TOTAL PROTEIN: 6.1 GM/DL (ref 6.4–8.2)
TRANSFUSION STATUS: NORMAL
UNIT DIVISION: 0
UNIT NUMBER: NORMAL
WBC # BLD: 6.8 K/CU MM (ref 4–10.5)

## 2020-02-07 PROCEDURE — 94761 N-INVAS EAR/PLS OXIMETRY MLT: CPT

## 2020-02-07 PROCEDURE — 85027 COMPLETE CBC AUTOMATED: CPT

## 2020-02-07 PROCEDURE — 2580000003 HC RX 258: Performed by: NURSE PRACTITIONER

## 2020-02-07 PROCEDURE — C9113 INJ PANTOPRAZOLE SODIUM, VIA: HCPCS | Performed by: SPECIALIST

## 2020-02-07 PROCEDURE — 1200000000 HC SEMI PRIVATE

## 2020-02-07 PROCEDURE — 80053 COMPREHEN METABOLIC PANEL: CPT

## 2020-02-07 PROCEDURE — 36415 COLL VENOUS BLD VENIPUNCTURE: CPT

## 2020-02-07 PROCEDURE — 6370000000 HC RX 637 (ALT 250 FOR IP): Performed by: NURSE PRACTITIONER

## 2020-02-07 PROCEDURE — 74176 CT ABD & PELVIS W/O CONTRAST: CPT

## 2020-02-07 PROCEDURE — 6360000002 HC RX W HCPCS: Performed by: SPECIALIST

## 2020-02-07 PROCEDURE — 6370000000 HC RX 637 (ALT 250 FOR IP): Performed by: SPECIALIST

## 2020-02-07 RX ADMIN — LEVOTHYROXINE SODIUM 88 MCG: 88 TABLET ORAL at 05:37

## 2020-02-07 RX ADMIN — SODIUM CHLORIDE, PRESERVATIVE FREE 10 ML: 5 INJECTION INTRAVENOUS at 09:36

## 2020-02-07 RX ADMIN — LORAZEPAM 1 MG: 1 TABLET ORAL at 09:34

## 2020-02-07 RX ADMIN — SODIUM CHLORIDE: 9 INJECTION, SOLUTION INTRAVENOUS at 14:12

## 2020-02-07 RX ADMIN — TRAZODONE HYDROCHLORIDE 25 MG: 50 TABLET ORAL at 22:16

## 2020-02-07 RX ADMIN — HYDROCODONE BITARTRATE AND ACETAMINOPHEN 1 TABLET: 7.5; 325 TABLET ORAL at 22:16

## 2020-02-07 RX ADMIN — CYANOCOBALAMIN TAB 1000 MCG 1000 MCG: 1000 TAB at 09:34

## 2020-02-07 RX ADMIN — DOXAZOSIN 4 MG: 4 TABLET ORAL at 09:34

## 2020-02-07 RX ADMIN — Medication 2000 UNITS: at 09:33

## 2020-02-07 RX ADMIN — SODIUM CHLORIDE: 9 INJECTION, SOLUTION INTRAVENOUS at 22:16

## 2020-02-07 RX ADMIN — ANASTROZOLE 1 MG: 1 TABLET, COATED ORAL at 09:48

## 2020-02-07 RX ADMIN — POLYETHYLENE GLYCOL 3350, SODIUM SULFATE ANHYDROUS, SODIUM BICARBONATE, SODIUM CHLORIDE, POTASSIUM CHLORIDE 4000 ML: 236; 22.74; 6.74; 5.86; 2.97 POWDER, FOR SOLUTION ORAL at 17:19

## 2020-02-07 RX ADMIN — NEBIVOLOL HYDROCHLORIDE 10 MG: 10 TABLET ORAL at 09:34

## 2020-02-07 RX ADMIN — Medication 400 MG: at 09:35

## 2020-02-07 RX ADMIN — HYDROCODONE BITARTRATE AND ACETAMINOPHEN 1 TABLET: 7.5; 325 TABLET ORAL at 05:37

## 2020-02-07 RX ADMIN — SERTRALINE HYDROCHLORIDE 50 MG: 50 TABLET ORAL at 09:35

## 2020-02-07 RX ADMIN — HYDROCODONE BITARTRATE AND ACETAMINOPHEN 1 TABLET: 7.5; 325 TABLET ORAL at 00:02

## 2020-02-07 RX ADMIN — PANTOPRAZOLE SODIUM 40 MG: 40 INJECTION, POWDER, FOR SOLUTION INTRAVENOUS at 07:33

## 2020-02-07 ASSESSMENT — PAIN - FUNCTIONAL ASSESSMENT: PAIN_FUNCTIONAL_ASSESSMENT: PREVENTS OR INTERFERES SOME ACTIVE ACTIVITIES AND ADLS

## 2020-02-07 ASSESSMENT — PAIN SCALES - GENERAL
PAINLEVEL_OUTOF10: 0
PAINLEVEL_OUTOF10: 0
PAINLEVEL_OUTOF10: 8
PAINLEVEL_OUTOF10: 0
PAINLEVEL_OUTOF10: 0
PAINLEVEL_OUTOF10: 8
PAINLEVEL_OUTOF10: 4
PAINLEVEL_OUTOF10: 10
PAINLEVEL_OUTOF10: 0
PAINLEVEL_OUTOF10: 0

## 2020-02-07 ASSESSMENT — PAIN DESCRIPTION - LOCATION
LOCATION: JAW
LOCATION: ABDOMEN
LOCATION: ABDOMEN

## 2020-02-07 ASSESSMENT — PAIN DESCRIPTION - ORIENTATION: ORIENTATION: LEFT

## 2020-02-07 ASSESSMENT — PAIN DESCRIPTION - DIRECTION: RADIATING_TOWARDS: LEFT FACE AND NECK

## 2020-02-07 ASSESSMENT — PAIN DESCRIPTION - PAIN TYPE
TYPE: ACUTE PAIN

## 2020-02-07 ASSESSMENT — PAIN DESCRIPTION - ONSET: ONSET: ON-GOING

## 2020-02-07 ASSESSMENT — PAIN DESCRIPTION - DESCRIPTORS: DESCRIPTORS: ACHING;CRAMPING

## 2020-02-07 ASSESSMENT — PAIN DESCRIPTION - PROGRESSION: CLINICAL_PROGRESSION: NOT CHANGED

## 2020-02-07 ASSESSMENT — PAIN DESCRIPTION - FREQUENCY
FREQUENCY: INTERMITTENT
FREQUENCY: CONTINUOUS

## 2020-02-07 NOTE — ANESTHESIA PRE PROCEDURE
Department of Anesthesiology  Preprocedure Note       Name:  Danelle Avalos   Age:  81 y.o.  :  1935                                          MRN:  [id number]         Date:  2020      Surgeon: Lila Jeff):  Joselito Jin MD    Procedure: COLONOSCOPY DIAGNOSTIC (N/A )    Medications prior to admission:   Prior to Admission medications    Medication Sig Start Date End Date Taking? Authorizing Provider   doxazosin (CARDURA) 4 MG tablet TAKE 1 TABLET BY MOUTH EVERY DAY 10/1/19   Historical Provider, MD   nebivolol (BYSTOLIC) 10 MG tablet Take by mouth daily    Historical Provider, MD   vitamin B-12 (CYANOCOBALAMIN) 1000 MCG tablet Take 1,000 mcg by mouth daily    Historical Provider, MD   simvastatin (ZOCOR) 10 MG tablet Take 10 mg by mouth nightly    Historical Provider, MD   anastrozole (ARIMIDEX) 1 MG tablet Take 1 mg by mouth daily    Historical Provider, MD   losartan-hydrochlorothiazide (HYZAAR) 50-12.5 MG per tablet  10/27/19   Historical Provider, MD   Magnesium 400 MG TABS Take by mouth    Historical Provider, MD   fluticasone (FLONASE) 50 MCG/ACT nasal spray 1 spray by Nasal route daily 12/3/19   EVERTON Jaquez - CNP   clopidogrel (PLAVIX) 75 MG tablet Take 75 mg by mouth daily. Historical Provider, MD   ezetimibe (ZETIA) 10 MG tablet Take 10 mg by mouth daily. Historical Provider, MD   esomeprazole Magnesium (NEXIUM) 40 MG PACK Take 40 mg by mouth daily. Historical Provider, MD   lisinopril-hydrochlorothiazide (PRINZIDE;ZESTORETIC) 20-25 MG per tablet Take 1 tablet by mouth daily. Historical Provider, MD   levothyroxine (SYNTHROID) 88 MCG tablet Take 88 mcg by mouth Daily. Historical Provider, MD   potassium chloride (KLOR-CON) 20 MEQ packet Take 20 mEq by mouth daily. Historical Provider, MD   traZODone (DESYREL) 25 mg TABS Take 25 mg by mouth nightly. Historical Provider, MD   clorazepate (TRANXENE) 7.5 MG tablet Take 7.5 mg by mouth 2 times daily.     Historical Provider, MD   Cholecalciferol (VITAMIN D) 2000 UNITS CAPS capsule Take  by mouth daily. Historical Provider, MD   aspirin 325 MG tablet Take 325 mg by mouth daily. Historical Provider, MD   sertraline (ZOLOFT) 50 MG tablet Take 50 mg by mouth daily. Historical Provider, MD   alendronate (FOSAMAX) 10 MG tablet Take 10 mg by mouth once a week.     Historical Provider, MD       Current medications:    Current Facility-Administered Medications   Medication Dose Route Frequency Provider Last Rate Last Dose    polyethylene glycol (GoLYTELY) solution 4,000 mL  4,000 mL Oral Once Matthew Ruelas MD        0.9 % sodium chloride bolus  20 mL Intravenous Once Matthew Ruelas MD        pantoprazole (PROTONIX) injection 40 mg  40 mg Intravenous Daily Matthew Ruelas MD   40 mg at 02/07/20 0234    And    sodium chloride (PF) 0.9 % injection 10 mL  10 mL Intravenous Daily Matthew Ruelas MD        sodium chloride flush 0.9 % injection 10 mL  10 mL Intravenous 2 times per day EVERTON Craft CNP   10 mL at 02/07/20 0936    sodium chloride flush 0.9 % injection 10 mL  10 mL Intravenous PRN EVERTON Craft CNP   10 mL at 02/05/20 0021    acetaminophen (TYLENOL) tablet 650 mg  650 mg Oral Q4H PRN EVERTON Carft CNP        ondansetron (ZOFRAN) injection 4 mg  4 mg Intravenous Q6H PRN EVERTON Mclaughlin CNP        0.9 % sodium chloride infusion   Intravenous Continuous EVERTON Mclaughlin CNP 75 mL/hr at 02/06/20 2353      fluticasone (FLONASE) 50 MCG/ACT nasal spray 1 spray  1 spray Nasal Daily EVERTON Craft CNP   Stopped at 02/06/20 0945    vitamin D CAPS 2,000 Units  2 capsule Oral Daily EVERTON Craft CNP   2,000 Units at 02/07/20 0933    anastrozole (ARIMIDEX) tablet 1 mg  1 mg Oral Daily EVERTON Craft CNP   1 mg at 02/07/20 0948    vitamin B-12 (CYANOCOBALAMIN) tablet 1,000 mcg  1,000 mcg Oral Daily EVERTON Craft CNP   1,000 mcg at 02/07/20 0934    traZODone ([facility]) Social History     Tobacco Use    Smoking status: Never Smoker    Smokeless tobacco: Never Used   Substance Use Topics    Alcohol use: No                                Counseling given: Not Answered      Vital Signs (Current):   Vitals:    02/07/20 1134 02/07/20 1245 02/07/20 1300 02/07/20 1358   BP: (!) 150/69  (!) 150/68    Pulse: 56 57 52 65   Resp: 12 15 13    Temp: 36.7 °C (98 °F)  36.7 °C (98.1 °F)    TempSrc: Oral  Oral    SpO2: 98%  98%    Weight:       Height:                                                  BP Readings from Last 3 Encounters:   02/07/20 (!) 150/68   02/06/20 (!) 104/51   12/03/19 (!) 140/70       NPO Status: Time of last liquid consumption: 0000                        Time of last solid consumption: 0000                        Date of last liquid consumption: 02/06/20                        Date of last solid food consumption: 02/06/20    BMI:   Wt Readings from Last 3 Encounters:   02/07/20 169 lb 8.5 oz (76.9 kg)   12/03/19 172 lb (78 kg)   08/12/13 168 lb (76.2 kg)     Body mass index is 23.65 kg/m². CBC:   Lab Results   Component Value Date    WBC 6.8 02/07/2020    RBC 3.03 02/07/2020    HGB 8.3 02/07/2020    HCT 26.4 02/07/2020    MCV 87.1 02/07/2020    RDW 15.9 02/07/2020     02/07/2020       CMP:   Lab Results   Component Value Date     02/07/2020    K 3.5 02/07/2020     02/07/2020    CO2 20 02/07/2020    BUN 22 02/07/2020    CREATININE 1.7 02/07/2020    GFRAA 47 02/07/2020    LABGLOM 39 02/07/2020    GLUCOSE 102 02/07/2020    PROT 6.1 02/07/2020    PROT 7.4 10/27/2011    CALCIUM 8.2 02/07/2020    BILITOT 0.3 02/07/2020    ALKPHOS 75 02/07/2020    AST 21 02/07/2020    ALT 15 02/07/2020       POC Tests: No results for input(s): POCGLU, POCNA, POCK, POCCL, POCBUN, POCHEMO, POCHCT in the last 72 hours.     Coags:   Lab Results   Component Value Date    PROTIME 14.8 02/06/2020    INR 1.22 02/06/2020    APTT 40.8 02/06/2020       HCG (If Applicable): No results found for: PREGTESTUR, PREGSERUM, HCG, HCGQUANT     ABGs: No results found for: PHART, PO2ART, FYG2AHB, FJW1GDW, BEART, X7ADAXQC     Type & Screen (If Applicable):  No results found for: LABABO, [address]    Anesthesia Evaluation  Patient summary reviewed and Nursing notes reviewed no history of anesthetic complications:   Airway: Mallampati: III  TM distance: >3 FB   Neck ROM: full  Mouth opening: < 3 FB Dental:      Comment: Denies loose/chipped/cracked teeth. Filling in left lower molar    Pulmonary:Negative Pulmonary ROS breath sounds clear to auscultation      (-) not a current smoker                           Cardiovascular:  Exercise tolerance: poor (<4 METS),   (+) hypertension:, CAD:, CABG/stent:, hyperlipidemia      ECG reviewed  Rhythm: regular  Rate: normal           Beta Blocker:  Dose within 24 Hrs      ROS comment: Normal sinus rhythm   Normal ECG   No previous ECGs available   Confirmed by SHANIQUA Monzon ([id number]) on 2/4/2020 9:12:56 PM     Neuro/Psych:   (+) CVA:,             GI/Hepatic/Renal:   (+) GERD:, bowel prep,          ROS comment: GI bleed. Endo/Other:    (+) blood dyscrasia: anemia, arthritis:., .                 Abdominal:           Vascular: negative vascular ROS. Anesthesia Plan      MAC     ASA 3       Induction: intravenous. Anesthetic plan and risks discussed with patient. Plan discussed with CRNA. EVERTON Wisdom - CRNA   2/7/2020       Pre Anesthesia Assessment complete.  Chart reviewed on 2/7/2020

## 2020-02-07 NOTE — PROGRESS NOTES
is identified. No acute pulmonary disease. Calcific atherosclerotic disease aorta. Ct Lumbar Spine Wo Contrast    Result Date: 2/3/2020  EXAMINATION: CT OF THE LUMBAR SPINE WITHOUT CONTRAST  2/3/2020 TECHNIQUE: CT of the lumbar spine was performed without the administration of intravenous contrast. Multiplanar reformatted images are provided for review. Dose modulation, iterative reconstruction, and/or weight based adjustment of the mA/kV was utilized to reduce the radiation dose to as low as reasonably achievable. COMPARISON: None HISTORY: ORDERING SYSTEM PROVIDED HISTORY: pain, nki TECHNOLOGIST PROVIDED HISTORY: Reason for exam:->pain, nki Reason for Exam: back pain Acuity: Acute Type of Exam: Initial Additional signs and symptoms: Pt to ED for c/o fatigue, back pain, and left sided jaw pain since Thursday evening Relevant Medical/Surgical History: hx of back surgery no new inj FINDINGS: BONES/ALIGNMENT: Previous vertebral body enhancement L3, L4 and L5. Unchanged T12 compression deformity. No acute fracture. No destructive bony abnormality identified. DEGENERATIVE CHANGES: Multilevel degenerative disc disease and facet joint arthropathy. Disc bulging and posterior spurring combined with facet joint arthropathy at L2-L3, L3-L4, L4-L5 and L5-S1 causing canal stenosis. SOFT TISSUES/RETROPERITONEUM: No paraspinal mass is seen. Unchanged calcified cystic lesion right kidney. 1. No acute lumbar spine abnormality 2. Multilevel degenerative changes causing canal stenosis from L2-S1 3.  Stable compression deformity T12       Assessment and Plan:   Mo Sparks is a 81 y.o.  male  who presents with Melena     Acute blood loss anaemia likely from GI bleed  Also anemia of chronic disorders  S/p 2 units blood transfusion, Hb trended again down to 7.1>>recieved one more transfusion, now to 8.3  Continue Protonix  Monitor H&H closely  Continue to hold anti platelets  GI following, did EGD yesterday which did not

## 2020-02-08 ENCOUNTER — ANESTHESIA (OUTPATIENT)
Dept: ENDOSCOPY | Age: 85
DRG: 378 | End: 2020-02-08
Payer: MEDICARE

## 2020-02-08 VITALS — OXYGEN SATURATION: 100 % | DIASTOLIC BLOOD PRESSURE: 53 MMHG | SYSTOLIC BLOOD PRESSURE: 125 MMHG

## 2020-02-08 LAB
ALBUMIN SERPL-MCNC: 2.8 GM/DL (ref 3.4–5)
ALP BLD-CCNC: 68 IU/L (ref 40–128)
ALT SERPL-CCNC: 14 U/L (ref 10–40)
ANION GAP SERPL CALCULATED.3IONS-SCNC: 12 MMOL/L (ref 4–16)
APTT: 36.2 SECONDS (ref 25.1–37.1)
AST SERPL-CCNC: 18 IU/L (ref 15–37)
BILIRUB SERPL-MCNC: 0.3 MG/DL (ref 0–1)
BUN BLDV-MCNC: 14 MG/DL (ref 6–23)
CALCIUM SERPL-MCNC: 7.8 MG/DL (ref 8.3–10.6)
CHLORIDE BLD-SCNC: 106 MMOL/L (ref 99–110)
CO2: 23 MMOL/L (ref 21–32)
CREAT SERPL-MCNC: 1.7 MG/DL (ref 0.9–1.3)
GFR AFRICAN AMERICAN: 47 ML/MIN/1.73M2
GFR NON-AFRICAN AMERICAN: 39 ML/MIN/1.73M2
GLUCOSE BLD-MCNC: 94 MG/DL (ref 70–99)
HCT VFR BLD CALC: 24.9 % (ref 42–52)
HEMOGLOBIN: 7.8 GM/DL (ref 13.5–18)
INR BLD: 1.21 INDEX
MCH RBC QN AUTO: 27.5 PG (ref 27–31)
MCHC RBC AUTO-ENTMCNC: 31.3 % (ref 32–36)
MCV RBC AUTO: 87.7 FL (ref 78–100)
PDW BLD-RTO: 15.5 % (ref 11.7–14.9)
PLATELET # BLD: 122 K/CU MM (ref 140–440)
PMV BLD AUTO: 9.5 FL (ref 7.5–11.1)
POTASSIUM SERPL-SCNC: 3.4 MMOL/L (ref 3.5–5.1)
PROTHROMBIN TIME: 14.7 SECONDS (ref 11.7–14.5)
RBC # BLD: 2.84 M/CU MM (ref 4.6–6.2)
SODIUM BLD-SCNC: 141 MMOL/L (ref 135–145)
TOTAL PROTEIN: 5.6 GM/DL (ref 6.4–8.2)
WBC # BLD: 6.6 K/CU MM (ref 4–10.5)

## 2020-02-08 PROCEDURE — 6370000000 HC RX 637 (ALT 250 FOR IP): Performed by: NURSE PRACTITIONER

## 2020-02-08 PROCEDURE — 2580000003 HC RX 258: Performed by: NURSE PRACTITIONER

## 2020-02-08 PROCEDURE — 3700000001 HC ADD 15 MINUTES (ANESTHESIA): Performed by: SPECIALIST

## 2020-02-08 PROCEDURE — 80053 COMPREHEN METABOLIC PANEL: CPT

## 2020-02-08 PROCEDURE — 2709999900 HC NON-CHARGEABLE SUPPLY: Performed by: SPECIALIST

## 2020-02-08 PROCEDURE — 0DJD8ZZ INSPECTION OF LOWER INTESTINAL TRACT, VIA NATURAL OR ARTIFICIAL OPENING ENDOSCOPIC: ICD-10-PCS | Performed by: SPECIALIST

## 2020-02-08 PROCEDURE — 1200000000 HC SEMI PRIVATE

## 2020-02-08 PROCEDURE — 6360000002 HC RX W HCPCS: Performed by: NURSE ANESTHETIST, CERTIFIED REGISTERED

## 2020-02-08 PROCEDURE — 85610 PROTHROMBIN TIME: CPT

## 2020-02-08 PROCEDURE — 3700000000 HC ANESTHESIA ATTENDED CARE: Performed by: SPECIALIST

## 2020-02-08 PROCEDURE — 2500000003 HC RX 250 WO HCPCS: Performed by: NURSE ANESTHETIST, CERTIFIED REGISTERED

## 2020-02-08 PROCEDURE — 85730 THROMBOPLASTIN TIME PARTIAL: CPT

## 2020-02-08 PROCEDURE — 2580000003 HC RX 258: Performed by: SPECIALIST

## 2020-02-08 PROCEDURE — 3609027000 HC COLONOSCOPY: Performed by: SPECIALIST

## 2020-02-08 PROCEDURE — 85027 COMPLETE CBC AUTOMATED: CPT

## 2020-02-08 PROCEDURE — C9113 INJ PANTOPRAZOLE SODIUM, VIA: HCPCS | Performed by: SPECIALIST

## 2020-02-08 PROCEDURE — 36415 COLL VENOUS BLD VENIPUNCTURE: CPT

## 2020-02-08 PROCEDURE — 6360000002 HC RX W HCPCS: Performed by: SPECIALIST

## 2020-02-08 RX ORDER — POTASSIUM CHLORIDE 7.45 MG/ML
10 INJECTION INTRAVENOUS PRN
Status: DISCONTINUED | OUTPATIENT
Start: 2020-02-08 | End: 2020-02-09 | Stop reason: HOSPADM

## 2020-02-08 RX ORDER — LIDOCAINE HYDROCHLORIDE 20 MG/ML
INJECTION, SOLUTION INFILTRATION; PERINEURAL PRN
Status: DISCONTINUED | OUTPATIENT
Start: 2020-02-08 | End: 2020-02-08 | Stop reason: SDUPTHER

## 2020-02-08 RX ORDER — PROPOFOL 10 MG/ML
INJECTION, EMULSION INTRAVENOUS PRN
Status: DISCONTINUED | OUTPATIENT
Start: 2020-02-08 | End: 2020-02-08 | Stop reason: SDUPTHER

## 2020-02-08 RX ADMIN — NEBIVOLOL HYDROCHLORIDE 10 MG: 10 TABLET ORAL at 09:53

## 2020-02-08 RX ADMIN — SODIUM CHLORIDE, PRESERVATIVE FREE 10 ML: 5 INJECTION INTRAVENOUS at 23:37

## 2020-02-08 RX ADMIN — SODIUM CHLORIDE: 9 INJECTION, SOLUTION INTRAVENOUS at 23:37

## 2020-02-08 RX ADMIN — LORAZEPAM 1 MG: 1 TABLET ORAL at 09:53

## 2020-02-08 RX ADMIN — PROPOFOL 20 MG: 10 INJECTION, EMULSION INTRAVENOUS at 07:11

## 2020-02-08 RX ADMIN — PROPOFOL 20 MG: 10 INJECTION, EMULSION INTRAVENOUS at 07:31

## 2020-02-08 RX ADMIN — SIMVASTATIN 10 MG: 10 TABLET, FILM COATED ORAL at 20:44

## 2020-02-08 RX ADMIN — PROPOFOL 60 MG: 10 INJECTION, EMULSION INTRAVENOUS at 07:08

## 2020-02-08 RX ADMIN — PROPOFOL 20 MG: 10 INJECTION, EMULSION INTRAVENOUS at 07:42

## 2020-02-08 RX ADMIN — Medication 2000 UNITS: at 09:53

## 2020-02-08 RX ADMIN — DOXAZOSIN 4 MG: 4 TABLET ORAL at 09:53

## 2020-02-08 RX ADMIN — PANTOPRAZOLE SODIUM 40 MG: 40 INJECTION, POWDER, FOR SOLUTION INTRAVENOUS at 09:52

## 2020-02-08 RX ADMIN — PROPOFOL 20 MG: 10 INJECTION, EMULSION INTRAVENOUS at 07:45

## 2020-02-08 RX ADMIN — SODIUM CHLORIDE 10 ML: 9 INJECTION, SOLUTION INTRAMUSCULAR; INTRAVENOUS; SUBCUTANEOUS at 09:54

## 2020-02-08 RX ADMIN — TRAZODONE HYDROCHLORIDE 25 MG: 50 TABLET ORAL at 20:44

## 2020-02-08 RX ADMIN — PROPOFOL 20 MG: 10 INJECTION, EMULSION INTRAVENOUS at 07:39

## 2020-02-08 RX ADMIN — SERTRALINE HYDROCHLORIDE 50 MG: 50 TABLET ORAL at 09:53

## 2020-02-08 RX ADMIN — LIDOCAINE HYDROCHLORIDE 100 MG: 20 INJECTION, SOLUTION INFILTRATION; PERINEURAL at 07:08

## 2020-02-08 RX ADMIN — LORAZEPAM 1 MG: 1 TABLET ORAL at 20:44

## 2020-02-08 RX ADMIN — ANASTROZOLE 1 MG: 1 TABLET, COATED ORAL at 10:21

## 2020-02-08 RX ADMIN — PROPOFOL 20 MG: 10 INJECTION, EMULSION INTRAVENOUS at 07:19

## 2020-02-08 RX ADMIN — CYANOCOBALAMIN TAB 1000 MCG 1000 MCG: 1000 TAB at 09:53

## 2020-02-08 RX ADMIN — PROPOFOL 20 MG: 10 INJECTION, EMULSION INTRAVENOUS at 07:34

## 2020-02-08 RX ADMIN — PROPOFOL 20 MG: 10 INJECTION, EMULSION INTRAVENOUS at 07:37

## 2020-02-08 RX ADMIN — PROPOFOL 20 MG: 10 INJECTION, EMULSION INTRAVENOUS at 07:24

## 2020-02-08 RX ADMIN — PROPOFOL 20 MG: 10 INJECTION, EMULSION INTRAVENOUS at 07:15

## 2020-02-08 RX ADMIN — PROPOFOL 20 MG: 10 INJECTION, EMULSION INTRAVENOUS at 07:28

## 2020-02-08 ASSESSMENT — LIFESTYLE VARIABLES: SMOKING_STATUS: 0

## 2020-02-08 NOTE — BRIEF OP NOTE
Brief Postoperative Note      Tory Swann is a 81 y.o. male     Pre-operative Diagnosis: ANEMIA  / GIT BLEEDING    Post-operative Diagnosis:  D.COLI  /  HDS  NO BLOOD NOTED IN THE COLON    Procedure: COLONOSCOPY    Anesthesia: MAC    Surgeons/Assistants:Smita Clark     Estimated Blood Loss: NONE    Complications: None    Specimens: were not obtained    REC  SMALL BOWEL EVALUATION / HEMATOLOGY CONSULT  Smita Clark   2/8/2020   7:58 AM

## 2020-02-08 NOTE — PROGRESS NOTES
Hospitalist Progress Note      Name:  Mira Diane /Age/Sex: 1935  (81 y.o. male)   MRN & CSN:  [id number] & [id number] Admission Date/Time: 2/3/2020  6:52 PM   Location:  5453/6653-D PCP: Yanira Lugo, [employer] Day: 6    History of Present Illness:     Chief Complaint: Mauricio Cruz is a 81 y.o.  male  who presents with GI bleed     The patient seen and examined at bedside. Patient seen after colonoscopy. Patient's daughter at bedside. Daughter expressed concern that patient is been bleeding and not able to get anticoagulation. He did not have any bowel movement today so not sure if he is bleeding or not. Ten point ROS reviewed negative, unless as noted above    Objective:        Intake/Output Summary (Last 24 hours) at 2020 1621  Last data filed at 2020 0751  Gross per 24 hour   Intake 400 ml   Output --   Net 400 ml      Vitals:   Vitals:    20 1017   BP: (!) 149/69   Pulse: 72   Resp: 17   Temp: 98.4 °F (36.9 °C)   SpO2: 95%     Physical Exam:   General Appearance: alert and oriented to person, place and time, in no acute distress  Cardiovascular: normal rate, regular rhythm, normal S1 and S2, no murmurs, rubs, clicks, or gallops, distal pulses intact, no carotid bruits, no JVD  Pulmonary/Chest: clear to auscultation bilaterally- no wheezes, rales or rhonchi, normal air movement, no respiratory distress  Abdomen: soft, non-tender, non-distended, normal bowel sounds, no masses   Extremities: no cyanosis, clubbing or edema, pulse   Skin: warm and dry, no rash or erythema  Head: normocephalic and atraumatic  Eyes: pupils equal, round, and reactive to light  Neck: supple and non-tender without mass, no thyromegaly   Musculoskeletal: normal range of motion, no joint swelling, deformity or tenderness  Neurological: alert, oriented, normal speech, no focal findings or movement disorder noted    Medications:   Medications:    sodium chloride  20 mL Intravenous The lungs appear clear. No pleural fluid evident. No pneumothorax is seen. No acute osseous abnormality is identified. No acute pulmonary disease. Calcific atherosclerotic disease aorta. Ct Lumbar Spine Wo Contrast    Result Date: 2/3/2020  EXAMINATION: CT OF THE LUMBAR SPINE WITHOUT CONTRAST  2/3/2020 TECHNIQUE: CT of the lumbar spine was performed without the administration of intravenous contrast. Multiplanar reformatted images are provided for review. Dose modulation, iterative reconstruction, and/or weight based adjustment of the mA/kV was utilized to reduce the radiation dose to as low as reasonably achievable. COMPARISON: None HISTORY: ORDERING SYSTEM PROVIDED HISTORY: pain, nki TECHNOLOGIST PROVIDED HISTORY: Reason for exam:->pain, nki Reason for Exam: back pain Acuity: Acute Type of Exam: Initial Additional signs and symptoms: Pt to ED for c/o fatigue, back pain, and left sided jaw pain since Thursday evening Relevant Medical/Surgical History: hx of back surgery no new inj FINDINGS: BONES/ALIGNMENT: Previous vertebral body enhancement L3, L4 and L5. Unchanged T12 compression deformity. No acute fracture. No destructive bony abnormality identified. DEGENERATIVE CHANGES: Multilevel degenerative disc disease and facet joint arthropathy. Disc bulging and posterior spurring combined with facet joint arthropathy at L2-L3, L3-L4, L4-L5 and L5-S1 causing canal stenosis. SOFT TISSUES/RETROPERITONEUM: No paraspinal mass is seen. Unchanged calcified cystic lesion right kidney. 1. No acute lumbar spine abnormality 2. Multilevel degenerative changes causing canal stenosis from L2-S1 3.  Stable compression deformity T12       Assessment and Plan:   Mo Sparks is a 81 y.o.  male  who presents with Melena     Acute blood loss anaemia likely from GI bleed  Also anemia of chronic disorders  S/p 2 units blood transfusion, Hb trended again down to 7.1>>recieved one more transfusion, now to 8.3  Hemoglobin again dropped to 7.8, no bowel movement today. 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 Diet DIET GENERAL;   DVT Prophylaxis [] Lovenox, []  Heparin, [x] SCDs, [] Ambulation   GI Prophylaxis [x] PPI,  [] H2 Blocker,  [] Carafate,  [] Diet/Tube Feeds   Code Status Full Code   Disposition Patient requires continued admission due to Blood loss anemia from GI bleed. Likely to be discharged tomorrow, if stable.    MDM [] Low, [x] Moderate,[]  High     Electronically signed by Nghia Sadler MD on 2/8/2020 at 4:21 PM

## 2020-02-09 VITALS
WEIGHT: 175.5 LBS | RESPIRATION RATE: 16 BRPM | HEIGHT: 71 IN | BODY MASS INDEX: 24.57 KG/M2 | HEART RATE: 70 BPM | OXYGEN SATURATION: 92 % | DIASTOLIC BLOOD PRESSURE: 71 MMHG | TEMPERATURE: 100 F | SYSTOLIC BLOOD PRESSURE: 148 MMHG

## 2020-02-09 LAB
ALBUMIN SERPL-MCNC: 2.6 GM/DL (ref 3.4–5)
ALP BLD-CCNC: 62 IU/L (ref 40–129)
ALT SERPL-CCNC: 12 U/L (ref 10–40)
ANION GAP SERPL CALCULATED.3IONS-SCNC: 11 MMOL/L (ref 4–16)
AST SERPL-CCNC: 16 IU/L (ref 15–37)
BILIRUB SERPL-MCNC: 0.2 MG/DL (ref 0–1)
BUN BLDV-MCNC: 14 MG/DL (ref 6–23)
CALCIUM SERPL-MCNC: 7.6 MG/DL (ref 8.3–10.6)
CHLORIDE BLD-SCNC: 106 MMOL/L (ref 99–110)
CO2: 23 MMOL/L (ref 21–32)
CREAT SERPL-MCNC: 1.7 MG/DL (ref 0.9–1.3)
GFR AFRICAN AMERICAN: 47 ML/MIN/1.73M2
GFR NON-AFRICAN AMERICAN: 39 ML/MIN/1.73M2
GLUCOSE BLD-MCNC: 120 MG/DL (ref 70–99)
HCT VFR BLD CALC: 25.3 % (ref 42–52)
HEMOGLOBIN: 7.8 GM/DL (ref 13.5–18)
MCH RBC QN AUTO: 27.8 PG (ref 27–31)
MCHC RBC AUTO-ENTMCNC: 30.8 % (ref 32–36)
MCV RBC AUTO: 90 FL (ref 78–100)
PDW BLD-RTO: 15.2 % (ref 11.7–14.9)
PLATELET # BLD: 137 K/CU MM (ref 140–440)
PMV BLD AUTO: 10.8 FL (ref 7.5–11.1)
POTASSIUM SERPL-SCNC: 3.4 MMOL/L (ref 3.5–5.1)
RBC # BLD: 2.81 M/CU MM (ref 4.6–6.2)
SODIUM BLD-SCNC: 140 MMOL/L (ref 135–145)
TOTAL PROTEIN: 5.4 GM/DL (ref 6.4–8.2)
WBC # BLD: 6.5 K/CU MM (ref 4–10.5)

## 2020-02-09 PROCEDURE — 85027 COMPLETE CBC AUTOMATED: CPT

## 2020-02-09 PROCEDURE — 80053 COMPREHEN METABOLIC PANEL: CPT

## 2020-02-09 PROCEDURE — 36415 COLL VENOUS BLD VENIPUNCTURE: CPT

## 2020-02-09 PROCEDURE — 6370000000 HC RX 637 (ALT 250 FOR IP): Performed by: NURSE PRACTITIONER

## 2020-02-09 PROCEDURE — 6370000000 HC RX 637 (ALT 250 FOR IP): Performed by: INTERNAL MEDICINE

## 2020-02-09 PROCEDURE — 6360000002 HC RX W HCPCS: Performed by: SPECIALIST

## 2020-02-09 PROCEDURE — C9113 INJ PANTOPRAZOLE SODIUM, VIA: HCPCS | Performed by: SPECIALIST

## 2020-02-09 RX ORDER — POTASSIUM CHLORIDE 20 MEQ/1
40 TABLET, EXTENDED RELEASE ORAL ONCE
Status: COMPLETED | OUTPATIENT
Start: 2020-02-09 | End: 2020-02-09

## 2020-02-09 RX ADMIN — SERTRALINE HYDROCHLORIDE 50 MG: 50 TABLET ORAL at 10:37

## 2020-02-09 RX ADMIN — CYANOCOBALAMIN TAB 1000 MCG 1000 MCG: 1000 TAB at 10:36

## 2020-02-09 RX ADMIN — LEVOTHYROXINE SODIUM 88 MCG: 88 TABLET ORAL at 06:05

## 2020-02-09 RX ADMIN — Medication 400 MG: at 10:37

## 2020-02-09 RX ADMIN — ACETAMINOPHEN 650 MG: 325 TABLET ORAL at 06:05

## 2020-02-09 RX ADMIN — PANTOPRAZOLE SODIUM 40 MG: 40 INJECTION, POWDER, FOR SOLUTION INTRAVENOUS at 10:37

## 2020-02-09 RX ADMIN — DOXAZOSIN 4 MG: 4 TABLET ORAL at 10:37

## 2020-02-09 RX ADMIN — NEBIVOLOL HYDROCHLORIDE 10 MG: 10 TABLET ORAL at 10:37

## 2020-02-09 RX ADMIN — Medication 2000 UNITS: at 10:36

## 2020-02-09 RX ADMIN — LORAZEPAM 1 MG: 1 TABLET ORAL at 10:38

## 2020-02-09 RX ADMIN — POTASSIUM CHLORIDE 40 MEQ: 20 TABLET, EXTENDED RELEASE ORAL at 12:34

## 2020-02-09 ASSESSMENT — PAIN SCALES - GENERAL: PAINLEVEL_OUTOF10: 0

## 2020-02-09 NOTE — DISCHARGE SUMMARY
BONES/ALIGNMENT: Previous vertebral body enhancement L3, L4 and L5. Unchanged T12 compression deformity. No acute fracture. No destructive bony abnormality identified. DEGENERATIVE CHANGES: Multilevel degenerative disc disease and facet joint arthropathy. Disc bulging and posterior spurring combined with facet joint arthropathy at L2-L3, L3-L4, L4-L5 and L5-S1 causing canal stenosis. SOFT TISSUES/RETROPERITONEUM: No paraspinal mass is seen. Unchanged calcified cystic lesion right kidney.      1. No acute lumbar spine abnormality 2. Multilevel degenerative changes causing canal stenosis from L2-S1 3. Stable compression deformity T12     Discharge Exam:  General Appearance: alert and oriented to person, place and time, in no acute distress  Cardiovascular: normal rate, regular rhythm, normal S1 and S2, no murmurs, rubs, clicks, or gallops, distal pulses intact, no carotid bruits, no JVD  Pulmonary/Chest: clear to auscultation bilaterally- no wheezes, rales or rhonchi, normal air movement, no respiratory distress  Abdomen: soft, non-tender, non-distended, normal bowel sounds, no masses   Extremities: no cyanosis, clubbing or edema, pulse   Skin: warm and dry, no rash or erythema  Head: normocephalic and atraumatic  Eyes: pupils equal, round, and reactive to light  Neck: supple and non-tender without mass, no thyromegaly   Musculoskeletal: normal range of motion, no joint swelling, deformity or tenderness  Neurological: alert, oriented, normal speech, no focal findings or movement disorder noted    Disposition: home    Patient Instructions:     Discharge Medications:   [place], [address] [facility] Medication Instructions XNB:[id number]    Printed on:02/09/20 1561   Medication Information                      alendronate (FOSAMAX) 10 MG tablet  Take 10 mg by mouth once a week.              anastrozole (ARIMIDEX) 1 MG tablet  Take 1 mg by mouth daily             Cholecalciferol (VITAMIN D) 2000 UNITS CAPS capsule  Take  by mouth daily. clorazepate (TRANXENE) 7.5 MG tablet  Take 7.5 mg by mouth 2 times daily. doxazosin (CARDURA) 4 MG tablet  TAKE 1 TABLET BY MOUTH EVERY DAY             esomeprazole Magnesium (NEXIUM) 40 MG PACK  Take 40 mg by mouth daily. ezetimibe (ZETIA) 10 MG tablet  Take 10 mg by mouth daily. fluticasone (FLONASE) 50 MCG/ACT nasal spray  1 spray by Nasal route daily             levothyroxine (SYNTHROID) 88 MCG tablet  Take 88 mcg by mouth Daily. lisinopril-hydrochlorothiazide (PRINZIDE;ZESTORETIC) 20-25 MG per tablet  Take 1 tablet by mouth daily. Magnesium 400 MG TABS  Take by mouth             nebivolol (BYSTOLIC) 10 MG tablet  Take by mouth daily             potassium chloride (KLOR-CON) 20 MEQ packet  Take 20 mEq by mouth daily. sertraline (ZOLOFT) 50 MG tablet  Take 50 mg by mouth daily. simvastatin (ZOCOR) 10 MG tablet  Take 10 mg by mouth nightly             traZODone (DESYREL) 25 mg TABS  Take 25 mg by mouth nightly.              vitamin B-12 (CYANOCOBALAMIN) 1000 MCG tablet  Take 1,000 mcg by mouth daily                 Activity: activity as tolerated    Diet: cardiac diet    Wound Care: none needed    Follow-up:  PCP 1-2 weeks  Cardiology in 1-2 weeks    Time Spent Doing discharge 35 minutes  Electronically signed by Susan Rico MD  on 2/9/20 at 3:05 PM

## 2020-02-10 ENCOUNTER — HOSPITAL ENCOUNTER (OUTPATIENT)
Dept: GENERAL RADIOLOGY | Age: 85
Discharge: HOME | End: 2020-02-10
Payer: MEDICARE

## 2020-02-10 PROCEDURE — 74250 X-RAY XM SM INT 1CNTRST STD: CPT

## 2020-03-02 ENCOUNTER — HOSPITAL ENCOUNTER (OUTPATIENT)
Age: 85
Discharge: HOME | End: 2020-03-02
Payer: MEDICARE

## 2020-03-02 LAB
ANION GAP SERPL CALCULATED.3IONS-SCNC: 13 MMOL/L (ref 4–16)
BASOPHILS ABSOLUTE: 0 K/CU MM
BASOPHILS RELATIVE PERCENT: 0.5 % (ref 0–1)
BUN BLDV-MCNC: 32 MG/DL (ref 6–23)
CALCIUM SERPL-MCNC: 9.1 MG/DL (ref 8.3–10.6)
CHLORIDE BLD-SCNC: 105 MMOL/L (ref 99–110)
CHOLESTEROL, FASTING: 125 MG/DL
CO2: 22 MMOL/L (ref 21–32)
CREAT SERPL-MCNC: 2.1 MG/DL (ref 0.9–1.3)
DIFFERENTIAL TYPE: ABNORMAL
EOSINOPHILS ABSOLUTE: 0.2 K/CU MM
EOSINOPHILS RELATIVE PERCENT: 3.1 % (ref 0–3)
GFR AFRICAN AMERICAN: 37 ML/MIN/1.73M2
GFR NON-AFRICAN AMERICAN: 30 ML/MIN/1.73M2
GLUCOSE BLD-MCNC: 89 MG/DL (ref 70–99)
HCT VFR BLD CALC: 29 % (ref 42–52)
HDLC SERPL-MCNC: 49 MG/DL
HEMOGLOBIN: 8.7 GM/DL (ref 13.5–18)
IMMATURE NEUTROPHIL %: 0.2 % (ref 0–0.43)
LDL CHOLESTEROL DIRECT: 70 MG/DL
LYMPHOCYTES ABSOLUTE: 1.7 K/CU MM
LYMPHOCYTES RELATIVE PERCENT: 28.6 % (ref 24–44)
MCH RBC QN AUTO: 27.4 PG (ref 27–31)
MCHC RBC AUTO-ENTMCNC: 30 % (ref 32–36)
MCV RBC AUTO: 91.5 FL (ref 78–100)
MONOCYTES ABSOLUTE: 0.6 K/CU MM
MONOCYTES RELATIVE PERCENT: 9.9 % (ref 0–4)
NUCLEATED RBC %: 0 %
PDW BLD-RTO: 15.6 % (ref 11.7–14.9)
PLATELET # BLD: 132 K/CU MM (ref 140–440)
PMV BLD AUTO: 11.6 FL (ref 7.5–11.1)
POTASSIUM SERPL-SCNC: 4 MMOL/L (ref 3.5–5.1)
RBC # BLD: 3.17 M/CU MM (ref 4.6–6.2)
SEGMENTED NEUTROPHILS ABSOLUTE COUNT: 3.4 K/CU MM
SEGMENTED NEUTROPHILS RELATIVE PERCENT: 57.7 % (ref 36–66)
SODIUM BLD-SCNC: 140 MMOL/L (ref 135–145)
T4 FREE: 1.28 NG/DL (ref 0.9–1.8)
TOTAL IMMATURE NEUTOROPHIL: 0.01 K/CU MM
TOTAL NUCLEATED RBC: 0 K/CU MM
TRIGLYCERIDE, FASTING: 71 MG/DL
TSH HIGH SENSITIVITY: 0.05 UIU/ML (ref 0.27–4.2)
VITAMIN D 25-HYDROXY: 60 NG/ML
WBC # BLD: 5.8 K/CU MM (ref 4–10.5)

## 2020-03-02 PROCEDURE — 82306 VITAMIN D 25 HYDROXY: CPT

## 2020-03-02 PROCEDURE — 85025 COMPLETE CBC W/AUTO DIFF WBC: CPT

## 2020-03-02 PROCEDURE — 36415 COLL VENOUS BLD VENIPUNCTURE: CPT

## 2020-03-02 PROCEDURE — 84443 ASSAY THYROID STIM HORMONE: CPT

## 2020-03-02 PROCEDURE — 80048 BASIC METABOLIC PNL TOTAL CA: CPT

## 2020-03-02 PROCEDURE — 84439 ASSAY OF FREE THYROXINE: CPT

## 2020-03-02 PROCEDURE — 80061 LIPID PANEL: CPT

## 2020-04-08 LAB
EKG ATRIAL RATE: 77 BPM
EKG DIAGNOSIS: NORMAL
EKG P AXIS: 23 DEGREES
EKG P-R INTERVAL: 164 MS
EKG Q-T INTERVAL: 360 MS
EKG QRS DURATION: 82 MS
EKG QTC CALCULATION (BAZETT): 407 MS
EKG R AXIS: 17 DEGREES
EKG T AXIS: 17 DEGREES
EKG VENTRICULAR RATE: 77 BPM

## 2020-06-19 ENCOUNTER — HOSPITAL ENCOUNTER (OUTPATIENT)
Dept: ULTRASOUND IMAGING | Age: 85
Discharge: HOME | End: 2020-06-19
Payer: MEDICARE

## 2020-06-19 PROCEDURE — 76775 US EXAM ABDO BACK WALL LIM: CPT

## 2020-06-26 ENCOUNTER — HOSPITAL ENCOUNTER (OUTPATIENT)
Age: 85
Discharge: HOME | End: 2020-06-26
Payer: MEDICARE

## 2020-06-26 LAB
ANION GAP SERPL CALCULATED.3IONS-SCNC: 9 MMOL/L (ref 4–16)
BUN BLDV-MCNC: 33 MG/DL (ref 6–23)
CALCIUM SERPL-MCNC: 9.4 MG/DL (ref 8.3–10.6)
CHLORIDE BLD-SCNC: 102 MMOL/L (ref 99–110)
CHOLESTEROL: 116 MG/DL
CO2: 24 MMOL/L (ref 21–32)
CREAT SERPL-MCNC: 2.1 MG/DL (ref 0.9–1.3)
GFR AFRICAN AMERICAN: 37 ML/MIN/1.73M2
GFR NON-AFRICAN AMERICAN: 30 ML/MIN/1.73M2
GLUCOSE BLD-MCNC: 99 MG/DL (ref 70–99)
HDLC SERPL-MCNC: 48 MG/DL
LDL CHOLESTEROL DIRECT: 61 MG/DL
POTASSIUM SERPL-SCNC: 3.6 MMOL/L (ref 3.5–5.1)
SODIUM BLD-SCNC: 135 MMOL/L (ref 135–145)
T4 FREE: 1.5 NG/DL (ref 0.9–1.8)
TRIGL SERPL-MCNC: 80 MG/DL
TSH HIGH SENSITIVITY: 0.01 UIU/ML (ref 0.27–4.2)
VITAMIN D 25-HYDROXY: 60 NG/ML

## 2020-06-26 PROCEDURE — 84439 ASSAY OF FREE THYROXINE: CPT

## 2020-06-26 PROCEDURE — 80048 BASIC METABOLIC PNL TOTAL CA: CPT

## 2020-06-26 PROCEDURE — 83721 ASSAY OF BLOOD LIPOPROTEIN: CPT

## 2020-06-26 PROCEDURE — 84443 ASSAY THYROID STIM HORMONE: CPT

## 2020-06-26 PROCEDURE — 80061 LIPID PANEL: CPT

## 2020-06-26 PROCEDURE — 82306 VITAMIN D 25 HYDROXY: CPT

## 2020-06-26 PROCEDURE — 36415 COLL VENOUS BLD VENIPUNCTURE: CPT

## 2020-07-07 ENCOUNTER — HOSPITAL ENCOUNTER (OUTPATIENT)
Dept: WOMENS IMAGING | Age: 85
Discharge: HOME | End: 2020-07-07
Payer: MEDICARE

## 2020-07-07 PROCEDURE — 77080 DXA BONE DENSITY AXIAL: CPT

## 2020-08-26 ENCOUNTER — TELEPHONE (OUTPATIENT)
Dept: ORTHOPEDIC SURGERY | Age: 85
End: 2020-08-26

## 2020-08-26 NOTE — TELEPHONE ENCOUNTER
Pt [facility] 08/25 wanting to schedule an appt to see Dr Jose Cotton regarding his right ankle. He states that Dr Jose Cotton had done sx on his r ankle in the past and he believes there is a screw coming through his ankle. He would like to schedule an appt to get that looked at. I called pt back and had to [facility]. Advised pt to call us back to schedule an appt.

## 2020-09-10 ENCOUNTER — OFFICE VISIT (OUTPATIENT)
Dept: ORTHOPEDIC SURGERY | Age: 85
End: 2020-09-10
Payer: MEDICARE

## 2020-09-10 VITALS — RESPIRATION RATE: 16 BRPM | BODY MASS INDEX: 25.2 KG/M2 | WEIGHT: 180 LBS | HEIGHT: 71 IN

## 2020-09-10 PROCEDURE — G8417 CALC BMI ABV UP PARAM F/U: HCPCS | Performed by: ORTHOPAEDIC SURGERY

## 2020-09-10 PROCEDURE — 1036F TOBACCO NON-USER: CPT | Performed by: ORTHOPAEDIC SURGERY

## 2020-09-10 PROCEDURE — 4040F PNEUMOC VAC/ADMIN/RCVD: CPT | Performed by: ORTHOPAEDIC SURGERY

## 2020-09-10 PROCEDURE — G8427 DOCREV CUR MEDS BY ELIG CLIN: HCPCS | Performed by: ORTHOPAEDIC SURGERY

## 2020-09-10 PROCEDURE — 99203 OFFICE O/P NEW LOW 30 MIN: CPT | Performed by: ORTHOPAEDIC SURGERY

## 2020-09-10 PROCEDURE — 1123F ACP DISCUSS/DSCN MKR DOCD: CPT | Performed by: ORTHOPAEDIC SURGERY

## 2020-09-10 RX ORDER — LOSARTAN POTASSIUM AND HYDROCHLOROTHIAZIDE 12.5; 5 MG/1; MG/1
TABLET ORAL
COMMUNITY
Start: 2020-07-18 | End: 2020-09-10 | Stop reason: ALTCHOICE

## 2020-09-10 RX ORDER — TRAZODONE HYDROCHLORIDE 150 MG/1
TABLET ORAL NIGHTLY
COMMUNITY
Start: 2020-08-25

## 2020-09-10 RX ORDER — ALENDRONATE SODIUM 70 MG/1
TABLET ORAL
COMMUNITY
Start: 2020-07-26 | End: 2021-10-15

## 2020-09-10 NOTE — PROGRESS NOTES
9/10/2020   Chief Complaint   Patient presents with    Ankle Pain     right ankle-prior ORIF 10/24/17         History of Present Illness:                             Mary Elkins is a 81 y.o. male presents today for evaluation of his right ankle pain and swelling overlying his previous surgical site. Additionally he has complaints of chronic back pain and radiating posterior right lower extremity pain. He states that his ankle has been doing well and he has tried to remain active and feels that the surgery healed well from his previous ankle fusion. He has noticed over the last 6 months and a mildly prominent and tender area overlying the medial aspect of his ankle anteriorly overlying a surgical site from the previous screw. He denies any traumatic injuries to the area. He states that the pain is minimal and only has not with a direct pressure over the prominent area. He denies any instability of the ankle or deep pain        Patient here for a new patient visit for evaluation of his right ankle s/p right ankle ORIF performed on 10/24/17, prior patient at [facility]. He states towards the beginning of the year he noticed a screw on his medial aspect of the ankle starting to protrude. It slowly has become more prominent over the least several months. He is not experiencing any pain. He has not had any injuries since surgery. No other issues.       Medical History  Patient's medications, allergies, past medical, surgical, social and family histories were reviewed and updated as appropriate.     Past Medical History:   Diagnosis Date    Arthritis     CAD (coronary artery disease)     GERD (gastroesophageal reflux disease)     Hyperlipidemia     Hypertension     Kidney cysts     Right kidney    Thyroid disease     Unspecified cerebral artery occlusion with cerebral infarction      Family History   Problem Relation Age of Onset    Heart Failure Mother     Kidney Disease Mother     Bleeding Prob Mother  Alzheimer's Disease Father      Social History     Socioeconomic History    Marital status:      Spouse name: None    Number of children: None    Years of education: None    Highest education level: None   Occupational History    None   Social Needs    Financial resource strain: None    Food insecurity     Worry: None     Inability: None    Transportation needs     Medical: None     Non-medical: None   Tobacco Use    Smoking status: Never Smoker    Smokeless tobacco: Never Used   Substance and Sexual Activity    Alcohol use: No    Drug use: No    Sexual activity: None   Lifestyle    Physical activity     Days per week: None     Minutes per session: None    Stress: None   Relationships    Social connections     Talks on phone: None     Gets together: None     Attends [employer] service: None     Active member of club or organization: None     Attends meetings of clubs or organizations: None     Relationship status: None    Intimate partner violence     Fear of current or ex partner: None     Emotionally abused: None     Physically abused: None     Forced sexual activity: None   Other Topics Concern    None   Social History Narrative    None     Current Outpatient Medications   Medication Sig Dispense Refill    traZODone (DESYREL) 50 MG tablet TAKE 2 TABLETS BY MOUTH AT BEDTIME      alendronate (FOSAMAX) 70 MG tablet TAKE 1 TABLET ONCE IN 2 WEEKS ORALLY 30 DAYS      doxazosin (CARDURA) 4 MG tablet TAKE 1 TABLET BY MOUTH EVERY DAY  3    nebivolol (BYSTOLIC) 10 MG tablet Take by mouth daily      vitamin B-12 (CYANOCOBALAMIN) 1000 MCG tablet Take 1,000 mcg by mouth daily      simvastatin (ZOCOR) 10 MG tablet Take 10 mg by mouth nightly      Magnesium 400 MG TABS Take by mouth      fluticasone (FLONASE) 50 MCG/ACT nasal spray 1 spray by Nasal route daily 1 Bottle 0    ezetimibe (ZETIA) 10 MG tablet Take 10 mg by mouth daily.       esomeprazole Magnesium (NEXIUM) 40 MG PACK Take 40 mg by mouth daily.  levothyroxine (SYNTHROID) 88 MCG tablet Take 88 mcg by mouth Daily.  potassium chloride (KLOR-CON) 20 MEQ packet Take 20 mEq by mouth daily.  clorazepate (TRANXENE) 7.5 MG tablet Take 7.5 mg by mouth 2 times daily.  Cholecalciferol (VITAMIN D) 2000 UNITS CAPS capsule Take  by mouth daily.  sertraline (ZOLOFT) 50 MG tablet Take 50 mg by mouth daily. No current facility-administered medications for this visit. Allergies   Allergen Reactions    Erythromycin     Pcn [Penicillins]        Review of Systems:   Review of Systems   Constitutional: Negative for chills and fever. HENT: Negative for congestion and sneezing. Eyes: Negative for pain and redness. Respiratory: Negative for chest tightness, shortness of breath and wheezing. Cardiovascular: Negative for chest pain and palpitations. Gastrointestinal: Negative for vomiting. Musculoskeletal: Positive for arthralgias. Skin: Negative for color change and rash. Neurological: Positive for numbness. Negative for weakness. Psychiatric/Behavioral: Negative for agitation. The patient is not nervous/anxious. Examination:  General Exam:  Vitals: Resp 16   Ht 5' 11\" (1.803 m)   Wt 180 lb (81.6 kg)   BMI 25.10 kg/m²    Physical Exam  Vitals signs and nursing note reviewed. Constitutional:       Appearance: Normal appearance. HENT:      Head: Normocephalic and atraumatic. Eyes:      Conjunctiva/sclera: Conjunctivae normal.      Pupils: Pupils are equal, round, and reactive to light. Neck:      Musculoskeletal: Normal range of motion. Pulmonary:      Effort: Pulmonary effort is normal.   Musculoskeletal:      Right knee: He exhibits normal range of motion, no swelling, no deformity and no laceration. No tenderness found. Left ankle: He exhibits normal range of motion, no swelling, no ecchymosis, no deformity, no laceration and normal pulse.  No tenderness. Achilles tendon normal.      Lumbar back: He exhibits decreased range of motion, tenderness and pain. Comments: Right lower extremity:  There are well-healed surgical scars over the medial lateral aspect of the ankle from previous fusion surgery. There is a prominent soft tissue area with mild associated tenderness directly at the anterior surgical site overlying the medial malleolus from screw placement. There is no prominent metal or hardware but a prominent thickened soft tissue area is present underneath the scar. No erythema or induration or fluctuance or wound present. No tenderness to palpation at the ankle arthrodesis site. Mild tenderness to palpation overlying the medial and lateral aspects of the subtalar joint. There is moderate restricted range of motion the subtalar joint with mild discomfort with stress testing of the subtalar joint. No tenderness to palpation of the foot. Full painless active range of motion of the foot and toes. Skin is intact. 2+ DP pulse and brisk cap refill. Mild decreased sensation of the foot. Skin:     General: Skin is warm and dry. Neurological:      Mental Status: He is alert and oriented to person, place, and time. Psychiatric:         Mood and Affect: Mood normal.         Behavior: Behavior normal.            Diagnostic testing:  X-rays reviewed in office, I independently reviewed the films in the office today:   Xr Ankle Right (min 3 Views)    Result Date: 9/10/2020  3 views of the ankle show evidence of previous arthrodesis of the tibiotalar joint with stable appearance of the 3 screws across the arthrodesis site. Normal position alignment of the arthrodesis of the ankle. There is evidence of advanced degenerative joint disease of the subtalar joint with complete loss of joint space at the posterior aspect of the subtalar joint at the posterior facet.   There is extensive subchondral sclerosis and irregularity of the articular surfaces of the subtalar joint. Normal position alignment of the articulations of the midfoot. No acute abnormality or fracture. Office Procedures:  No orders of the defined types were placed in this encounter. Assessment and Plan  1. Right ankle painful hardware    2. Chronic lumbar spine degenerative joint disease    We discussed the prominent scar tissue and irritation overlying the medial anterior ankle screw head. I explained to him that there is no evidence of loosening or backing out of the screw. We discussed the completion of the healing process at the ankle arthrodesis site. We also discussed some evidence of degenerative joint disease of the subtalar joint. Currently he feels that his symptoms are mild and he does not require any further treatment at this time. I reassured him that there is no concern for compromise at his arthrodesis site or loosening or backing out of the screws. He can advance activities as tolerated and follow-up as needed. We discussed potential for injections to the subtalar joint if needed. We also discussed his chronic ongoing lumbar spine and radiating lower extremity pain. He does have a history of degenerative lumbar disease. He has not had any treatments in the past.  I recommended that he be evaluated by Dr. Marnie Kelly for possible lumbar epidural injections. I sent a referral for that today.   Follow-up here as needed    Referral to Dr. Marnie Kelly for evaluation of possible lumbar epidural injections      Electronically signed by Alo Smalls MD on 9/10/2020 at 3:06 PM

## 2020-09-10 NOTE — PATIENT INSTRUCTIONS
Weightbearing and activities as tolerated   Recommend comfortable sturdy shoe-wear   Follow up as needed for right ankle     Referral to Dr Marnie Kelly for back pain

## 2020-09-11 ASSESSMENT — ENCOUNTER SYMPTOMS
EYE REDNESS: 0
COLOR CHANGE: 0
CHEST TIGHTNESS: 0
VOMITING: 0
SHORTNESS OF BREATH: 0
WHEEZING: 0
EYE PAIN: 0

## 2020-10-29 PROBLEM — N18.32 STAGE 3B CHRONIC KIDNEY DISEASE (HCC): Status: ACTIVE | Noted: 2020-10-29

## 2020-10-29 PROBLEM — I13.10 HYPERTENSIVE HEART AND RENAL DISEASE: Status: ACTIVE | Noted: 2020-10-29

## 2020-10-29 PROBLEM — R80.1 PERSISTENT PROTEINURIA: Status: ACTIVE | Noted: 2020-10-29

## 2021-05-03 ENCOUNTER — HOSPITAL ENCOUNTER (OUTPATIENT)
Age: 86
Discharge: HOME | End: 2021-05-03
Payer: MEDICARE

## 2021-05-03 LAB
ALBUMIN SERPL-MCNC: 3.7 GM/DL (ref 3.4–5)
ALP BLD-CCNC: 75 IU/L (ref 40–129)
ALT SERPL-CCNC: 7 U/L (ref 10–40)
ANION GAP SERPL CALCULATED.3IONS-SCNC: 6 MMOL/L (ref 4–16)
AST SERPL-CCNC: 13 IU/L (ref 15–37)
BILIRUB SERPL-MCNC: 0.3 MG/DL (ref 0–1)
BUN BLDV-MCNC: 27 MG/DL (ref 6–23)
CALCIUM SERPL-MCNC: 9 MG/DL (ref 8.3–10.6)
CHLORIDE BLD-SCNC: 106 MMOL/L (ref 99–110)
CO2: 26 MMOL/L (ref 21–32)
CREAT SERPL-MCNC: 2.1 MG/DL (ref 0.9–1.3)
CREATININE URINE: 85.1 MG/DL (ref 39–259)
GFR AFRICAN AMERICAN: 37 ML/MIN/1.73M2
GFR NON-AFRICAN AMERICAN: 30 ML/MIN/1.73M2
GLUCOSE BLD-MCNC: 88 MG/DL (ref 70–99)
IRON: 45 UG/DL (ref 59–158)
PCT TRANSFERRIN: 17 % (ref 10–44)
POTASSIUM SERPL-SCNC: 4.2 MMOL/L (ref 3.5–5.1)
PROT/CREAT RATIO, UR: 0.3
SODIUM BLD-SCNC: 138 MMOL/L (ref 135–145)
TOTAL IRON BINDING CAPACITY: 271 UG/DL (ref 250–450)
TOTAL PROTEIN: 7.3 GM/DL (ref 6.4–8.2)
UNSATURATED IRON BINDING CAPACITY: 226 UG/DL (ref 110–370)
URINE TOTAL PROTEIN: 25.6 MG/DL

## 2021-05-03 PROCEDURE — 82570 ASSAY OF URINE CREATININE: CPT

## 2021-05-03 PROCEDURE — 36415 COLL VENOUS BLD VENIPUNCTURE: CPT

## 2021-05-03 PROCEDURE — 80053 COMPREHEN METABOLIC PANEL: CPT

## 2021-05-03 PROCEDURE — 83550 IRON BINDING TEST: CPT

## 2021-05-03 PROCEDURE — 84156 ASSAY OF PROTEIN URINE: CPT

## 2021-05-03 PROCEDURE — 83540 ASSAY OF IRON: CPT

## 2021-05-26 ENCOUNTER — HOSPITAL ENCOUNTER (OUTPATIENT)
Dept: CT IMAGING | Age: 86
Discharge: HOME | End: 2021-05-26
Payer: MEDICARE

## 2021-05-26 DIAGNOSIS — N28.1 ACQUIRED CYST OF KIDNEY: ICD-10-CM

## 2021-05-26 PROCEDURE — 74176 CT ABD & PELVIS W/O CONTRAST: CPT

## 2021-09-13 ENCOUNTER — HOSPITAL ENCOUNTER (OUTPATIENT)
Age: 86
Discharge: HOME | End: 2021-09-13
Payer: MEDICARE

## 2021-09-13 DIAGNOSIS — N18.32 STAGE 3B CHRONIC KIDNEY DISEASE (HCC): ICD-10-CM

## 2021-09-13 DIAGNOSIS — R80.1 PERSISTENT PROTEINURIA: ICD-10-CM

## 2021-09-13 LAB
ALBUMIN SERPL-MCNC: 3.9 GM/DL (ref 3.4–5)
ANION GAP SERPL CALCULATED.3IONS-SCNC: 12 MMOL/L (ref 4–16)
BUN BLDV-MCNC: 32 MG/DL (ref 6–23)
CALCIUM SERPL-MCNC: 9 MG/DL (ref 8.3–10.6)
CHLORIDE BLD-SCNC: 103 MMOL/L (ref 99–110)
CO2: 24 MMOL/L (ref 21–32)
CREAT SERPL-MCNC: 1.8 MG/DL (ref 0.9–1.3)
CREATININE URINE: 84.3 MG/DL (ref 39–259)
GFR AFRICAN AMERICAN: 44 ML/MIN/1.73M2
GFR NON-AFRICAN AMERICAN: 36 ML/MIN/1.73M2
GLUCOSE BLD-MCNC: 118 MG/DL (ref 70–99)
PHOSPHORUS: 2.7 MG/DL (ref 2.5–4.9)
POTASSIUM SERPL-SCNC: 3.7 MMOL/L (ref 3.5–5.1)
PROT/CREAT RATIO, UR: 0.2
SODIUM BLD-SCNC: 139 MMOL/L (ref 135–145)
URINE TOTAL PROTEIN: 15 MG/DL

## 2021-09-13 PROCEDURE — 36415 COLL VENOUS BLD VENIPUNCTURE: CPT

## 2021-09-13 PROCEDURE — 80069 RENAL FUNCTION PANEL: CPT

## 2021-09-13 PROCEDURE — 84156 ASSAY OF PROTEIN URINE: CPT

## 2021-09-13 PROCEDURE — 82570 ASSAY OF URINE CREATININE: CPT

## 2021-09-14 PROBLEM — Z86.2 HISTORY OF ANEMIA DUE TO CKD: Status: ACTIVE | Noted: 2021-09-14

## 2021-09-14 PROBLEM — N18.9 HISTORY OF ANEMIA DUE TO CKD: Status: ACTIVE | Noted: 2021-09-14

## 2021-09-27 ENCOUNTER — HOSPITAL ENCOUNTER (OUTPATIENT)
Age: 86
Discharge: HOME | End: 2021-09-27
Payer: MEDICARE

## 2021-09-27 LAB
ALBUMIN SERPL-MCNC: 3.4 GM/DL (ref 3.4–5)
ANION GAP SERPL CALCULATED.3IONS-SCNC: 14 MMOL/L (ref 4–16)
BUN BLDV-MCNC: 47 MG/DL (ref 6–23)
CALCIUM SERPL-MCNC: 8.8 MG/DL (ref 8.3–10.6)
CHLORIDE BLD-SCNC: 108 MMOL/L (ref 99–110)
CO2: 20 MMOL/L (ref 21–32)
CREAT SERPL-MCNC: 2.9 MG/DL (ref 0.9–1.3)
GFR AFRICAN AMERICAN: 25 ML/MIN/1.73M2
GFR NON-AFRICAN AMERICAN: 21 ML/MIN/1.73M2
GLUCOSE BLD-MCNC: 132 MG/DL (ref 70–99)
PHOSPHORUS: 3.1 MG/DL (ref 2.5–4.9)
POTASSIUM SERPL-SCNC: 4.2 MMOL/L (ref 3.5–5.1)
SODIUM BLD-SCNC: 142 MMOL/L (ref 135–145)

## 2021-09-27 PROCEDURE — 85045 AUTOMATED RETICULOCYTE COUNT: CPT

## 2021-09-27 PROCEDURE — 86320 SERUM IMMUNOELECTROPHORESIS: CPT

## 2021-09-27 PROCEDURE — 84439 ASSAY OF FREE THYROXINE: CPT

## 2021-09-27 PROCEDURE — 36415 COLL VENOUS BLD VENIPUNCTURE: CPT

## 2021-09-27 PROCEDURE — 82746 ASSAY OF FOLIC ACID SERUM: CPT

## 2021-09-27 PROCEDURE — 85025 COMPLETE CBC W/AUTO DIFF WBC: CPT

## 2021-09-27 PROCEDURE — 84165 PROTEIN E-PHORESIS SERUM: CPT

## 2021-09-27 PROCEDURE — 83550 IRON BINDING TEST: CPT

## 2021-09-27 PROCEDURE — 82728 ASSAY OF FERRITIN: CPT

## 2021-09-27 PROCEDURE — 80069 RENAL FUNCTION PANEL: CPT

## 2021-09-27 PROCEDURE — 84443 ASSAY THYROID STIM HORMONE: CPT

## 2021-09-27 PROCEDURE — 82607 VITAMIN B-12: CPT

## 2021-09-27 PROCEDURE — 83540 ASSAY OF IRON: CPT

## 2021-09-28 LAB
ANISOCYTOSIS: ABNORMAL
BANDED NEUTROPHILS ABSOLUTE COUNT: 0.06 K/CU MM
BANDED NEUTROPHILS RELATIVE PERCENT: 1 % (ref 5–11)
DIFFERENTIAL TYPE: ABNORMAL
FERRITIN: 394 NG/ML (ref 30–400)
FOLATE: 12.9 NG/ML (ref 3.1–17.5)
HCT VFR BLD CALC: 29.2 % (ref 42–52)
HEMOGLOBIN: 8.8 GM/DL (ref 13.5–18)
IRON: 23 UG/DL (ref 59–158)
LYMPHOCYTES ABSOLUTE: 2.1 K/CU MM
LYMPHOCYTES RELATIVE PERCENT: 36 % (ref 24–44)
MCH RBC QN AUTO: 27 PG (ref 27–31)
MCHC RBC AUTO-ENTMCNC: 30.1 % (ref 32–36)
MCV RBC AUTO: 89.6 FL (ref 78–100)
METAMYELOCYTES ABSOLUTE COUNT: 0.06 K/CU MM
METAMYELOCYTES PERCENT: 1 %
MONOCYTES ABSOLUTE: 0.2 K/CU MM
MONOCYTES RELATIVE PERCENT: 4 % (ref 0–4)
OVALOCYTES: ABNORMAL
PCT TRANSFERRIN: 12 % (ref 10–44)
PDW BLD-RTO: 14.6 % (ref 11.7–14.9)
PLATELET # BLD: 190 K/CU MM (ref 140–440)
PLT MORPHOLOGY: ABNORMAL
PMV BLD AUTO: 11.3 FL (ref 7.5–11.1)
POLYCHROMASIA: ABNORMAL
RBC # BLD: 3.26 M/CU MM (ref 4.6–6.2)
RBC # BLD: ABNORMAL 10*6/UL
RETICULOCYTE COUNT PCT: 0.6 % (ref 0.2–2.2)
SEGMENTED NEUTROPHILS ABSOLUTE COUNT: 3.5 K/CU MM
SEGMENTED NEUTROPHILS RELATIVE PERCENT: 58 % (ref 36–66)
T4 FREE: 1.31 NG/DL (ref 0.9–1.8)
TOTAL IRON BINDING CAPACITY: 197 UG/DL (ref 250–450)
TSH HIGH SENSITIVITY: 0.01 UIU/ML (ref 0.27–4.2)
UNSATURATED IRON BINDING CAPACITY: 174 UG/DL (ref 110–370)
VITAMIN B-12: >2000 PG/ML (ref 211–911)
WBC # BLD: 5.9 K/CU MM (ref 4–10.5)

## 2021-09-30 LAB
ALBUMIN ELP: 2.9 GM/DL (ref 3.2–5.6)
ALPHA-1-GLOBULIN: 0.4 GM/DL (ref 0.1–0.4)
ALPHA-2-GLOBULIN: 0.9 GM/DL (ref 0.4–1.2)
BETA GLOBULIN: 1.1 GM/DL (ref 0.5–1.3)
GAMMA GLOBULIN: 2.1 GM/DL (ref 0.5–1.6)
SPEP INTERPRETATION: ABNORMAL
SPEP INTERPRETATION: NORMAL
TOTAL PROTEIN: 7.4 GM/DL (ref 6.4–8.2)

## 2021-10-08 NOTE — PROGRESS NOTES
Patient Name:  Kelsi Rangel  Patient :  1935  Patient MRN:  [id number]     Primary Oncologist: Rohini Peguero MD  Referring Provider: Rod Rahman MD     Date of Service: 10/15/2021      Reason for Consult: Anemia      Chief Complaint:    Chief Complaint   Patient presents with    New Patient        Patient Active Problem List:     Melena     GI bleed     Acute kidney injury superimposed on CKD ([facility]     Stage 3b chronic kidney disease ([facility] [address]     Persistent proteinuria     Hypertensive heart and renal disease     History of anemia due to CKD     HPI:   David Laguerre is a pleasant 81year old male patient who was referred for evaluation of anemia. On 2021 WBC was 5.9, hemoglobin 8.8, hematocrit 29.2, MCV 89.6, platelet 085. Peripheral smear showed anisocytosis, polychromasia, ovalocytes. Ferritin was 394, iron 23, TIBC 197, folate 12.9, B12 more than 2000. TSH was 0.06. Free T4 was normal at 1.31. Creatinine was 2.9, total protein 7.4, albumin 3.4. SPEP showed a faint monoclonal spike noted measuring 500 mg/dL. 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  This dominant lesion is decreased in size since  and is probably benign.  These renal lesions could be further evaluated with renal mass protocol MRI at patient's current renal function if clinically indicated. Bone density in 2020 showed osteopenia. I reviewed his records from epic. He has been anemic at least since . Hemoglobin in  was normal.  In 2020 he had episode of rectal bleed and received 3 units of packed red blood cell.   Reportedly he had upper and lower endoscopic study in the first part of  by Dr. Royal Hagen. 4-5 polyps were removed from the colon and he has hiatal hernia. Follow-up in for 5 years was recommended. Creatinine has slowly gone up since . Anemia could be multifactorial.  He is on B12 supplement, Synthroid, Plavix. We discussed about bone marrow study within 2 weeks. He has chronic back pain for years related to degenerative joint disease. Past Medical History:   Past Medical History:   Diagnosis Date    Arthritis     CAD (coronary artery disease)     GERD (gastroesophageal reflux disease)     Hyperlipidemia     Hypertension     Kidney cysts     Right kidney    Thyroid disease     Unspecified cerebral artery occlusion with cerebral infarction       Past Surgery History:    Past Surgical History:   Procedure Laterality Date    CATARACT REMOVAL      COLONOSCOPY  13    divertics    COLONOSCOPY N/A 2020    COLONOSCOPY DIAGNOSTIC performed by Ping Alvarado MD at [address] [place] [address] ENDOSCOPY, COLON, DIAGNOSTIC  13    egd: normal    FRACTURE SURGERY      PROSTATE BIOPSY      UPPER GASTROINTESTINAL ENDOSCOPY N/A 2020    EGD DIAGNOSTIC ONLY performed by Ping Alvarado MD at [facility] ENDOSCOPY     Social History:   He denied any history of smoking. No alcohol or illicit drug use. He has 4 children. 2 daughters  of heart problem. 1 son  of heart problem. Family History:   1 living daughter had breast cancer.     Allergies   Allergen Reactions    Erythromycin     Pcn [Penicillins]      Current Outpatient Medications on File Prior to Visit   Medication Sig Dispense Refill    alfuzosin (UROXATRAL) 10 MG extended release tablet Take 10 mg by mouth daily      losartan-hydroCHLOROthiazide (HYZAAR) 100-25 MG per tablet Take 1 tablet by mouth daily      potassium chloride (KLOR-CON) 10 MEQ extended release tablet Take 20 mEq by mouth daily      clopidogrel (PLAVIX) 75 MG tablet Take 75 mg by mouth daily       traZODone (DESYREL) 50 MG tablet TAKE 2 TABLETS BY MOUTH AT BEDTIME      doxazosin (CARDURA) 4 MG tablet 8 mg   3    nebivolol (BYSTOLIC) 10 MG tablet Take by mouth daily      vitamin B-12 (CYANOCOBALAMIN) 1000 MCG tablet Take 1,000 mcg by mouth daily      simvastatin (ZOCOR) 10 MG tablet Take 10 mg by mouth nightly      ezetimibe (ZETIA) 10 MG tablet Take 10 mg by mouth daily.  esomeprazole Magnesium (NEXIUM) 40 MG PACK Take 40 mg by mouth daily.  levothyroxine (SYNTHROID) 88 MCG tablet Take 88 mcg by mouth Daily.  clorazepate (TRANXENE) 7.5 MG tablet Take 7.5 mg by mouth 2 times daily.  Cholecalciferol (VITAMIN D) 2000 UNITS CAPS capsule Take  by mouth daily.  sertraline (ZOLOFT) 50 MG tablet Take 50 mg by mouth daily. No current facility-administered medications on file prior to visit. Review of Systems:    Constitutional:  No weight loss, No fever, No chills, No night sweats. Energy level low. Eyes:  No diplopia, No transient or permanent loss of vision, No scotomata. ENT / Mouth:  No epistaxis, No dysphagia, No hoarseness, No oral ulcers, No gingival bleeding. No sore throat, No postnasal drip, No nasal drip, No mouth pain, No sinus pain, No tinnitus, Normal hearing. Cardiovascular:  No chest pain, No palpitations, No syncope, No upper extremity edema, No lower extremity edema, No calf discomfort. Respiratory:  No cough. No hemoptysis, No pleurisy, No wheezing, No dyspnea. Gastrointestinal:  No abdominal pain, No abdominal cramping, No nausea, No vomiting, No constipation, No diarrhea, No hematochezia, No melena, No jaundice, No dyspepsia, No dysphagia. Urinary:  No dysuria, No hematuria, No urinary incontinence. Musculoskeletal:   He has low back pain   Skin:  No rash, No nodules, No pruritus, No lesions.   Neurologic:  No confusion, No seizures, No syncope, No tremor, No speech change, No headache, No hiccups, No abnormal gait, No sensory changes, No weakness. Psychiatric:  No depression, No anxiety, Concentration normal.  Endocrine:  No polyuria, No polydipsia, No hot flashes, No thyroid symptoms. Hematologic:  No epistaxis, No gingival bleeding, No petechiae, No ecchymosis. Lymphatic:  No lymphadenopathy, No lymphedema. Allergy / Immunologic:  No eczema, No frequent mucous infections, No frequent respiratory infections, No recurrent urticarial, No frequent skin infections. Vital Signs: BP (!) 142/66 (Site: Right Upper Arm, Position: Sitting, Cuff Size: Medium Adult)   Pulse 60   Temp 96.7 °F (35.9 °C) (Infrared)   Resp 16   Ht 5' 11\" (1.803 m)   Wt 160 lb 3.2 oz (72.7 kg)   SpO2 98%   BMI 22.34 kg/m²      Physical Exam:  CONSTITUTIONAL: awake, alert, cooperative, no apparent distress   EYES: pupils equal, round and reactive to light, sclera clear and conjunctiva pallor  ENT: Normocephalic, without obvious abnormality, atraumatic  NECK: supple, symmetrical, no jugular venous distension and no carotid bruits   HEMATOLOGIC/LYMPHATIC: no cervical, supraclavicular or axillary lymphadenopathy   LUNGS: no increased work of breathing and clear to auscultation   CARDIOVASCULAR: regular rate and rhythm, normal S1 and S2, no murmur noted  ABDOMEN: normal bowel sound, soft, non-distended, non-tender, no masses palpated, no hepatosplenomegaly   MUSCULOSKELETAL: full range of motion noted, tone is normal  NEUROLOGIC: awake, alert, oriented to name, place and time. Motor skills grossly intact. SKIN: Normal skin color, texture, turgor and no jaundice.  appears intact   EXTREMITIES: no LE edema        Labs:  Hematology:  Lab Results   Component Value Date    WBC 5.9 09/27/2021    RBC 3.26 (L) 09/27/2021    HGB 8.8 (L) 09/27/2021    HCT 29.2 (L) 09/27/2021    MCV 89.6 09/27/2021    MCH 27.0 09/27/2021    MCHC 30.1 (L) 09/27/2021    RDW 14.6 09/27/2021     09/27/2021    MPV 11.3 (H) 09/27/2021    BANDSPCT 1 (L) 09/27/2021    SEGSPCT 58.0 09/27/2021    EOSRELPCT 3.1 (H) 03/02/2020    BASOPCT 0.5 03/02/2020    LYMPHOPCT 36.0 09/27/2021    MONOPCT 4.0 09/27/2021    BANDABS 0.06 09/27/2021    SEGSABS 3.5 09/27/2021    EOSABS 0.2 03/02/2020    BASOSABS 0.0 03/02/2020    LYMPHSABS 2.1 09/27/2021    MONOSABS 0.2 09/27/2021    DIFFTYPE MANUAL DIFFERENTIAL 09/27/2021    ANISOCYTOSIS 1+ 09/27/2021    POLYCHROM 1+ 09/27/2021    PLTM FEW 09/27/2021     Lab Results   Component Value Date    ESR 34 (H) 09/07/2016       Chemistry:  Lab Results   Component Value Date     09/27/2021    K 4.2 09/27/2021     09/27/2021    CO2 20 (L) 09/27/2021    BUN 47 (H) 09/27/2021    CREATININE 2.9 (H) 09/27/2021    GLUCOSE 132 (H) 09/27/2021    CALCIUM 8.8 09/27/2021    PROT 7.4 09/27/2021    LABALBU 3.4 09/27/2021    BILITOT 0.3 05/03/2021    ALKPHOS 75 05/03/2021    AST 13 (L) 05/03/2021    ALT 7 (L) 05/03/2021    LABGLOM 21 (L) 09/27/2021    GFRAA 25 (L) 09/27/2021    PHOS 3.1 09/27/2021     Lab Results   Component Value Date    TSHHS 0.006 (L) 09/27/2021    T4FREE 1.31 09/27/2021    FT3 2.5 08/27/2011     Immunology:  Lab Results   Component Value Date    PROT 7.4 09/27/2021    SPEP  09/27/2021     INTERPRETATION - A faint monoclonal spike is noted, measuring approximately 500 mg/dL. The concurrent IFP shows a faint monoclonal free lambda light chain proteins. Decreased albumin. LP.    SPEP  09/27/2021     INTERPRETATION - A faint monoclonal free lambda protein is noted.   LP.    ALBUMINELP 2.9 (L) 09/27/2021    LABALPH 0.4 09/27/2021    LABALPH 0.9 09/27/2021    LABBETA 1.1 09/27/2021    GAMGLOB 2.1 (H) 09/27/2021     No results found for: Humza Estevez, KLFLCR  No results found for: B2M     Coagulation Panel:  Lab Results   Component Value Date    PROTIME 14.7 (H) 02/08/2020    INR 1.21 02/08/2020    APTT 36.2 02/08/2020       Anemia Panel:  Lab Results   Component Value Date    [id number] >2000 (H) 09/27/2021    FOLATE 12.9 09/27/2021 Observations:  PHQ-9 Total Score: 0 (10/15/2021  2:16 PM)     Assessment & Plan:  1. He has chronic anemia which has gotten worse. He also has monoclonal protein. He is agreeable to have a bone marrow study within 2 weeks. He iIs on Plavix. He had upper and lower endoscopic study in the first part of 2021. Discussed with him the risk and benefit of bone marrow study including bleeding, infection and etc.  His sister will bring him for the bone marrow study. 2.  He has chronic kidney disease. 3.  He has CVA. He is on Plavix. Return to clinic in 2 weeks for the bone marrow study. All of his questions has been answered for today. I have discussed the above stated plan with the patient and they verbalized understanding and agreed with the plan. Thank you for allowing us to participate in this patient's care.

## 2021-10-15 ENCOUNTER — INITIAL CONSULT (OUTPATIENT)
Dept: ONCOLOGY | Age: 86
End: 2021-10-15
Payer: MEDICARE

## 2021-10-15 ENCOUNTER — HOSPITAL ENCOUNTER (OUTPATIENT)
Dept: INFUSION THERAPY | Age: 86
Discharge: HOME | End: 2021-10-15
Payer: MEDICARE

## 2021-10-15 VITALS
BODY MASS INDEX: 22.43 KG/M2 | OXYGEN SATURATION: 98 % | RESPIRATION RATE: 16 BRPM | SYSTOLIC BLOOD PRESSURE: 142 MMHG | TEMPERATURE: 96.7 F | WEIGHT: 160.2 LBS | HEIGHT: 71 IN | HEART RATE: 60 BPM | DIASTOLIC BLOOD PRESSURE: 66 MMHG

## 2021-10-15 DIAGNOSIS — D47.2 MGUS (MONOCLONAL GAMMOPATHY OF UNKNOWN SIGNIFICANCE): Primary | ICD-10-CM

## 2021-10-15 PROCEDURE — 1036F TOBACCO NON-USER: CPT | Performed by: INTERNAL MEDICINE

## 2021-10-15 PROCEDURE — G8420 CALC BMI NORM PARAMETERS: HCPCS | Performed by: INTERNAL MEDICINE

## 2021-10-15 PROCEDURE — 99202 OFFICE O/P NEW SF 15 MIN: CPT

## 2021-10-15 PROCEDURE — G8484 FLU IMMUNIZE NO ADMIN: HCPCS | Performed by: INTERNAL MEDICINE

## 2021-10-15 PROCEDURE — 1123F ACP DISCUSS/DSCN MKR DOCD: CPT | Performed by: INTERNAL MEDICINE

## 2021-10-15 PROCEDURE — 4040F PNEUMOC VAC/ADMIN/RCVD: CPT | Performed by: INTERNAL MEDICINE

## 2021-10-15 PROCEDURE — 99204 OFFICE O/P NEW MOD 45 MIN: CPT | Performed by: INTERNAL MEDICINE

## 2021-10-15 PROCEDURE — G8427 DOCREV CUR MEDS BY ELIG CLIN: HCPCS | Performed by: INTERNAL MEDICINE

## 2021-10-15 ASSESSMENT — PATIENT HEALTH QUESTIONNAIRE - PHQ9
SUM OF ALL RESPONSES TO PHQ QUESTIONS 1-9: 0
2. FEELING DOWN, DEPRESSED OR HOPELESS: 0
SUM OF ALL RESPONSES TO PHQ QUESTIONS 1-9: 0
SUM OF ALL RESPONSES TO PHQ QUESTIONS 1-9: 0
SUM OF ALL RESPONSES TO PHQ9 QUESTIONS 1 & 2: 0
1. LITTLE INTEREST OR PLEASURE IN DOING THINGS: 0

## 2021-10-15 NOTE — PROGRESS NOTES
[facility] Rooming Questions  Patient: Mikey Allan  MRN: [id number]    Date: 10/15/2021        New patient          PHQ-9 Total Score: 0 (10/15/2021  2:16 PM)       PHQ-9 Given to (if applicable):               PHQ-9 Score (if applicable):                     [] Positive     []  Negative              Does question #9 need addressed (if applicable)                     [] Yes    []  No               Brit Rodriguez, CMA

## 2021-10-27 ENCOUNTER — PROCEDURE VISIT (OUTPATIENT)
Dept: ONCOLOGY | Age: 86
End: 2021-10-27
Payer: MEDICARE

## 2021-10-27 ENCOUNTER — HOSPITAL ENCOUNTER (OUTPATIENT)
Dept: INFUSION THERAPY | Age: 86
Discharge: HOME | End: 2021-10-27
Payer: MEDICARE

## 2021-10-27 VITALS
RESPIRATION RATE: 16 BRPM | SYSTOLIC BLOOD PRESSURE: 144 MMHG | HEART RATE: 72 BPM | TEMPERATURE: 98 F | BODY MASS INDEX: 22.2 KG/M2 | OXYGEN SATURATION: 96 % | WEIGHT: 158.6 LBS | DIASTOLIC BLOOD PRESSURE: 65 MMHG | HEIGHT: 71 IN

## 2021-10-27 DIAGNOSIS — D47.2 MGUS (MONOCLONAL GAMMOPATHY OF UNKNOWN SIGNIFICANCE): ICD-10-CM

## 2021-10-27 DIAGNOSIS — D47.2 MGUS (MONOCLONAL GAMMOPATHY OF UNKNOWN SIGNIFICANCE): Primary | ICD-10-CM

## 2021-10-27 LAB
BASOPHILS ABSOLUTE: 0 K/CU MM
BASOPHILS RELATIVE PERCENT: 0.2 % (ref 0–1)
DIFFERENTIAL TYPE: ABNORMAL
EOSINOPHILS ABSOLUTE: 0.1 K/CU MM
EOSINOPHILS RELATIVE PERCENT: 2.3 % (ref 0–3)
ERYTHROCYTE SEDIMENTATION RATE: 82 MM/HR (ref 0–20)
HCT VFR BLD CALC: 26.4 % (ref 42–52)
HEMOGLOBIN: 8.4 GM/DL (ref 13.5–18)
IGA: 719 MG/DL (ref 69–382)
IGG,SERUM: 1923 MG/DL (ref 723–1685)
IGM,SERUM: 189 MG/DL (ref 62–277)
LACTATE DEHYDROGENASE: 150 IU/L (ref 120–246)
LYMPHOCYTES ABSOLUTE: 2.2 K/CU MM
LYMPHOCYTES RELATIVE PERCENT: 36.6 % (ref 24–44)
MCH RBC QN AUTO: 27.2 PG (ref 27–31)
MCHC RBC AUTO-ENTMCNC: 31.8 % (ref 32–36)
MCV RBC AUTO: 85.4 FL (ref 78–100)
MONOCYTES ABSOLUTE: 1 K/CU MM
MONOCYTES RELATIVE PERCENT: 16.1 % (ref 0–4)
PDW BLD-RTO: 15 % (ref 11.7–14.9)
PLATELET # BLD: 135 K/CU MM (ref 140–440)
PMV BLD AUTO: 11 FL (ref 7.5–11.1)
RBC # BLD: 3.09 M/CU MM (ref 4.6–6.2)
RETICULOCYTE COUNT PCT: 1.2 % (ref 0.2–2.2)
SEGMENTED NEUTROPHILS ABSOLUTE COUNT: 2.7 K/CU MM
SEGMENTED NEUTROPHILS RELATIVE PERCENT: 44.8 % (ref 36–66)
URIC ACID: 5.8 MG/DL (ref 3.5–7.2)
WBC # BLD: 6 K/CU MM (ref 4–10.5)

## 2021-10-27 PROCEDURE — 82784 ASSAY IGA/IGD/IGG/IGM EACH: CPT

## 2021-10-27 PROCEDURE — 83883 ASSAY NEPHELOMETRY NOT SPEC: CPT

## 2021-10-27 PROCEDURE — 85045 AUTOMATED RETICULOCYTE COUNT: CPT

## 2021-10-27 PROCEDURE — 86320 SERUM IMMUNOELECTROPHORESIS: CPT

## 2021-10-27 PROCEDURE — 83615 LACTATE (LD) (LDH) ENZYME: CPT

## 2021-10-27 PROCEDURE — 84550 ASSAY OF BLOOD/URIC ACID: CPT

## 2021-10-27 PROCEDURE — 85652 RBC SED RATE AUTOMATED: CPT

## 2021-10-27 PROCEDURE — 38222 DX BONE MARROW BX & ASPIR: CPT | Performed by: INTERNAL MEDICINE

## 2021-10-27 PROCEDURE — 88313 SPECIAL STAINS GROUP 2: CPT | Performed by: PATHOLOGY

## 2021-10-27 PROCEDURE — 85025 COMPLETE CBC W/AUTO DIFF WBC: CPT

## 2021-10-27 PROCEDURE — 36415 COLL VENOUS BLD VENIPUNCTURE: CPT

## 2021-10-27 PROCEDURE — 82232 ASSAY OF BETA-2 PROTEIN: CPT

## 2021-10-27 PROCEDURE — 84165 PROTEIN E-PHORESIS SERUM: CPT

## 2021-10-27 PROCEDURE — 88342 IMHCHEM/IMCYTCHM 1ST ANTB: CPT | Performed by: PATHOLOGY

## 2021-10-27 PROCEDURE — 88305 TISSUE EXAM BY PATHOLOGIST: CPT | Performed by: PATHOLOGY

## 2021-10-27 NOTE — PROGRESS NOTES
[facility] Rooming Questions  Patient: Mg Horn  MRN: [id number]    Date: 10/27/2021        Time out procedure was completed prior to Bone Marrow Procedures. Patient verbalized understanding of post op instructions and copy of instructions sent home with patient. Patient was discharged home. 1. Do you have any new issues?   no         2. Do you need any refills on medications?    no    3. Have you had any imaging done since your last visit?   no    4. Have you been hospitalized or seen in the emergency room since your last visit here?   no    5. Did the patient have a depression screening completed today?  No    No data recorded     PHQ-9 Given to (if applicable):               PHQ-9 Score (if applicable):                     [] Positive     []  Negative              Does question #9 need addressed (if applicable)                     [] Yes    []  No               Marquetta Kanner, CMA

## 2021-10-27 NOTE — PROGRESS NOTES
Mg Honr  1935  [id number]      Primary Oncologist: Luis Enrique Roberts MD  Referring Provider:     10/27/2021     BONE MARROW BIOPSY & ASPIRATION    Consent:  Mg Horn voiced an understanding of the risks/benefits and the answers to their questions, then proceeded to sign and date the consent. A written consent was obtained from the patient and placed into the chart. Consent includes:  1. Explanation of the potential risks and benefits of this procedure, alternative methods of treatment, and risks despite precautions. 2. An understanding of the current diagnosis to make treatment decisions. 3. Administration of anesthetics as deemed advisable for the procedure. 4. An understanding that tissues and products may be removed and sent to pathology or disposed of by an appropriate source per practice standard. A time out was performed verifying the patient name, date of birth, procedure and location of the procedure. Procedure - BM Asp and Bx     Indication: R/O MM     Anesthesia:   5 ml 1 % Lidocaine                        0.5 mg Lorazepam     Patient position: prone     Bone Marrow Needle: yes     Procedure Steps: The patient was placed in the  prone position. The area was prepped and draped in a sterile fashion. Using an \"I\" needle a bone marrow aspirate was obtained from the right posterior iliac crest. Using Jamshidi needle a bone marrow biopsy was obtained. A sterile bandage was applied. No significant bleeding. Sample sent for histology, flow cytometry, and cytogenetics. In addition sample was sent for r/o MM     The patient is given the usual after care instructions. The patient will keep the area clearn and dry for 24 hours. Appropriate pain control instructions were discussed. The patient knows to call [phone] with any signs of bleeding, infection, or other difficulties.          Luis Enrique Roberts MD

## 2021-10-29 LAB
ALBUMIN ELP: 3.2 GM/DL (ref 3.2–5.6)
ALPHA-1-GLOBULIN: 0.3 GM/DL (ref 0.1–0.4)
ALPHA-2-GLOBULIN: 0.9 GM/DL (ref 0.4–1.2)
BETA GLOBULIN: 1.1 GM/DL (ref 0.5–1.3)
BETA-2 MICROGLOBULIN: 9.2 MG/L (ref 1.1–2.4)
GAMMA GLOBULIN: 2.1 GM/DL (ref 0.5–1.6)
KAPPA QUANT FREE LIGHT CHAINS: 121.04 MG/L (ref 3.3–19.4)
KAPPA/LAMBDA FREE LIGHT CHAIN RATIO: 1.21 (ref 0.26–1.65)
LAMBDA FREE LIGHT CHAINS QNT: 99.66 MG/L (ref 5.71–26.3)
SPEP INTERPRETATION: ABNORMAL
SPEP INTERPRETATION: NORMAL
TOTAL PROTEIN: 7.6 GM/DL (ref 6.4–8.2)

## 2021-10-29 NOTE — PROGRESS NOTES
Patient Name:  Jemma Yusuf  Patient :  1935  Patient MRN:  [id number]     Primary Oncologist: Todd Aranda MD  Referring Provider: Trice Ellington MD     Date of Service: 2021         Chief Complaint:    Chief Complaint   Patient presents with   [address]     He came in for follow up visit. Patient Active Problem List:     Melena     GI bleed     Acute kidney injury superimposed on CKD (HCC)     Stage 3b chronic kidney disease ([facility] [address]     Persistent proteinuria     Hypertensive heart and renal disease     History of anemia due to CKD     HPI:   Jennyfer Farrell is a pleasant 81year old male patient who was referred for evaluation of anemia. On 2021 WBC was 5.9, hemoglobin 8.8, hematocrit 29.2, MCV 89.6, platelet 509. Peripheral smear showed anisocytosis, polychromasia, ovalocytes. Ferritin was 394, iron 23, TIBC 197, folate 12.9, B12 more than 2000. TSH was 0.06. Free T4 was normal at 1.31. Creatinine was 2.9, total protein 7.4, albumin 3.4. SPEP showed a faint monoclonal spike noted measuring 500 mg/dL. 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  This dominant lesion is decreased in size since  and is probably benign.  These renal lesions could be further evaluated with renal mass protocol MRI at patient's current renal function if clinically indicated. Bone density in 2020 showed osteopenia. I reviewed his records from epic. He has been anemic at least since . Hemoglobin in  was normal.  In 2020 he had episode of rectal bleed and received 3 units of packed red blood cell.   Reportedly he had upper and lower endoscopic study in the first part of 2021 by Dr. Nick Jeffrey. 4-5 polyps were removed from the colon and he has hiatal hernia. Follow-up in for 5 years was recommended. Creatinine has slowly gone up since 2011. Anemia could be multifactorial.  He is on B12 supplement, Synthroid, Plavix. We discussed about bone marrow study within 2 weeks. He has chronic back pain for years related to degenerative joint disease. On November 16, 2021 he came in for follow-up visit. Bone marrow study in November 2021 showed  Final Pathologic Diagnosis:   A-B. Bone marrow, clot section and aspirate smears: -     Normocellular marrow with trilineage maturing hematopoiesis. -     Monotypic B-cell population is detected by flow cytometry (0.4% of total cells). 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 -     No definitive morphologic or flow cytometric evidence of a plasma cell neoplasm.   -     See comment. Peripheral blood smear:   -     Normocytic anemia with anisocytosis. -     Mild thrombocytopenia. Comment:   The monotypic B-cell population has a non-specific immunophenotype that can be seen in monoclonal B-cell   lymphocytosis as well as B-cell lymphomas such as marginal zone lymphoma, lymphoplasmacytic lymphoma, and [id number] follicular lymphoma. Deletion of the long arm of chromosome 20 (20q) is a recurring abnormality in myeloid disorders such as MDS but is not considered to be a definitive evidence of myelodysplasia on its own. No definitive morphologic   evidence of myelodysplasia is seen in the current specimen. Clinical followup is recommended.      Bone marrow differential count:   Blasts:               3%   Promyelocytes:          <1%   Myelocytes:          10%   Metamyelocytes:     5%   Bands:               13%   Neutrophils:          12%   Lymphocytes:          13%   Monocytes:          3%   Eosinophils:          2%   Basophils:          <1%   Plasma cells:          2%   Nucleated RBCs:     36%     Flow cytometry: Monotypic B-cells, suspicious for a B-cell lymphoproliferative disorder. Karyotype: 46,XY,del(20)(q11.2q13.1)[5]/46,XY[16]     FISH:   Del(1p): Not Detected   Dup(1q): Not Detected   Gains(5, 9, 15): Not Detected   Del(13q)/-13: Not Detected   Del(17p)(TP53): Not Detected   IGH(Rearrangement): Not Detected     Serum LDH was 150. WBC 6.0, hemoglobin 8.4, MCV 85.4, platelet 506. TSH was 0.06. T4 was 1.31. Creatinine was 2.9. He is agreeable to have CT chest abdomen pelvis to assess for potential lymphoproliferative disorder. No acute pain. Denied any nausea, vomiting or diarrhea. No fever or chills. No chest pain, shortness of breath or palpitation. No headache or dizzy spell. No specific bone pain. No melena or hematochezia. Denied any dysuria or hematuria. Past Medical History:   Past Medical History:   Diagnosis Date    Arthritis     CAD (coronary artery disease)     GERD (gastroesophageal reflux disease)     Hyperlipidemia     Hypertension     Kidney cysts     Right kidney    Thyroid disease     Unspecified cerebral artery occlusion with cerebral infarction       Past Surgery History:    Past Surgical History:   Procedure Laterality Date    CATARACT REMOVAL      COLONOSCOPY  13    divertics    COLONOSCOPY N/A 2020    COLONOSCOPY DIAGNOSTIC performed by Ezequiel Nguyen MD at [address] [place] [address] ENDOSCOPY, COLON, DIAGNOSTIC  13    egd: normal    FRACTURE SURGERY      PROSTATE BIOPSY      UPPER GASTROINTESTINAL ENDOSCOPY N/A 2020    EGD DIAGNOSTIC ONLY performed by Ezequiel Nguyen MD at [facility] ENDOSCOPY     Social History:   He denied any history of smoking. No alcohol or illicit drug use. He has 4 children. 2 daughters  of heart problem. 1 son  of heart problem. Family History:   1 living daughter had breast cancer. Review of Systems: The remainder of the review of system is unremarkable.      Vital Signs: BP (!) 140/68 (Site: Right Upper Arm, Position: Sitting, Cuff Size: Medium Adult)   Pulse 65   Temp 97.3 °F (36.3 °C) (Infrared)   Resp 16   Ht 5' 11\" (1.803 m)   Wt 160 lb (72.6 kg)   SpO2 95%   BMI 22.32 kg/m²      Physical Exam:  CONSTITUTIONAL: awake, alert, cooperative, no apparent distress   EYES: pupils equal, round and reactive to light, sclera clear and pale conjunctiva  ENT: Normocephalic, without obvious abnormality, atraumatic  NECK: supple, symmetrical, no jugular venous distension and no carotid bruits   HEMATOLOGIC/LYMPHATIC: no cervical, supraclavicular or axillary lymphadenopathy   LUNGS: no increased work of breathing and clear to auscultation   CARDIOVASCULAR: regular rate and rhythm, normal S1 and S2, no murmur noted  ABDOMEN: normal bowel sound, soft, non-distended, non-tender, no masses palpated, no hepatosplenomegaly   MUSCULOSKELETAL: full range of motion noted, tone is normal  NEUROLOGIC: awake, alert, oriented to name, place and time. Motor skills grossly intact. Cranial nerve II through XII are grossly intact   SKIN: No jaundice.  appears intact   EXTREMITIES: no LE edema  or cyanosis     Labs:  Hematology:  Lab Results   Component Value Date    WBC 6.0 10/27/2021    RBC 3.09 (L) 10/27/2021    HGB 8.4 (L) 10/27/2021    HCT 26.4 (L) 10/27/2021    MCV 85.4 10/27/2021    MCH 27.2 10/27/2021    MCHC 31.8 (L) 10/27/2021    RDW 15.0 (H) 10/27/2021     (L) 10/27/2021    MPV 11.0 10/27/2021    BANDSPCT 1 (L) 09/27/2021    SEGSPCT 44.8 10/27/2021    EOSRELPCT 2.3 10/27/2021    BASOPCT 0.2 10/27/2021    LYMPHOPCT 36.6 10/27/2021    MONOPCT 16.1 (H) 10/27/2021    BANDABS 0.06 09/27/2021    SEGSABS 2.7 10/27/2021    EOSABS 0.1 10/27/2021    BASOSABS 0.0 10/27/2021    LYMPHSABS 2.2 10/27/2021    MONOSABS 1.0 10/27/2021    DIFFTYPE AUTOMATED DIFFERENTIAL 10/27/2021    ANISOCYTOSIS 1+ 09/27/2021    POLYCHROM 1+ 09/27/2021    PLTM FEW 09/27/2021     Lab Results   Component Value Date    ESR 82 (H) 10/27/2021     Chemistry:  Lab Results   Component Value Date     09/27/2021    K 4.2 09/27/2021     09/27/2021    CO2 20 (L) 09/27/2021    BUN 47 (H) 09/27/2021    CREATININE 2.9 (H) 09/27/2021    GLUCOSE 132 (H) 09/27/2021    CALCIUM 8.8 09/27/2021    PROT 7.6 10/27/2021    LABALBU 3.4 09/27/2021    BILITOT 0.3 05/03/2021    ALKPHOS 75 05/03/2021    AST 13 (L) 05/03/2021    ALT 7 (L) 05/03/2021    LABGLOM 21 (L) 09/27/2021    GFRAA 25 (L) 09/27/2021    PHOS 3.1 09/27/2021     Lab Results   Component Value Date    TSHHS 0.006 (L) 09/27/2021    T4FREE 1.31 09/27/2021    FT3 2.5 08/27/2011     Immunology:  Lab Results   Component Value Date    PROT 7.6 10/27/2021    SPEP  10/27/2021     INTERPRETATION - A faint free lambda light chains monoclonal band is seen. 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 SAF    ALBUMINELP 3.2 10/27/2021    LABALPH 0.3 10/27/2021    LABALPH 0.9 10/27/2021    LABBETA 1.1 10/27/2021    GAMGLOB 2.1 (H) 10/27/2021     Coagulation Panel:  Lab Results   Component Value Date    PROTIME 14.7 (H) 02/08/2020    INR 1.21 02/08/2020    APTT 36.2 02/08/2020     Anemia Panel:  Lab Results   Component Value Date    ROIWERFD50 >2000 (H) 09/27/2021    FOLATE 12.9 09/27/2021      Observations:  PHQ-9 Total Score: 3 (11/16/2021 11:47 AM)  Thoughts that you would be better off dead, or of hurting yourself in some way: 0 (11/16/2021 11:47 AM)     Assessment & Plan:  1. He has chronic anemia. He also has monoclonal protein. He is on Plavix. He had upper and lower endoscopic study in the first part of 2021. Study in November 2021 was grossly unremarkable except for small monoclonal B-cell lymphocytosis. I will order CT chest abdomen pelvis to rule out lymphoproliferative disorder. [place] anemia is multifactorial. LDH was unremarkable. I will continue to monitor CBC.     2.  He has chronic kidney

## 2021-11-08 LAB
CHROMOSOME, BONE MARROW: NORMAL
COMMENT: NORMAL
GDT REPLACEMENT: NORMAL

## 2021-11-16 ENCOUNTER — OFFICE VISIT (OUTPATIENT)
Dept: ONCOLOGY | Age: 86
End: 2021-11-16
Payer: MEDICARE

## 2021-11-16 ENCOUNTER — HOSPITAL ENCOUNTER (OUTPATIENT)
Dept: INFUSION THERAPY | Age: 86
Discharge: HOME | End: 2021-11-16
Payer: MEDICARE

## 2021-11-16 VITALS
HEART RATE: 65 BPM | HEIGHT: 71 IN | SYSTOLIC BLOOD PRESSURE: 140 MMHG | WEIGHT: 160 LBS | TEMPERATURE: 97.3 F | OXYGEN SATURATION: 95 % | DIASTOLIC BLOOD PRESSURE: 68 MMHG | BODY MASS INDEX: 22.4 KG/M2 | RESPIRATION RATE: 16 BRPM

## 2021-11-16 DIAGNOSIS — D72.820 MONOCLONAL B-CELL LYMPHOCYTOSIS: Primary | ICD-10-CM

## 2021-11-16 PROCEDURE — 99214 OFFICE O/P EST MOD 30 MIN: CPT | Performed by: INTERNAL MEDICINE

## 2021-11-16 PROCEDURE — 99211 OFF/OP EST MAY X REQ PHY/QHP: CPT

## 2021-11-16 PROCEDURE — G8420 CALC BMI NORM PARAMETERS: HCPCS | Performed by: INTERNAL MEDICINE

## 2021-11-16 PROCEDURE — G8427 DOCREV CUR MEDS BY ELIG CLIN: HCPCS | Performed by: INTERNAL MEDICINE

## 2021-11-16 PROCEDURE — 1123F ACP DISCUSS/DSCN MKR DOCD: CPT | Performed by: INTERNAL MEDICINE

## 2021-11-16 PROCEDURE — G8484 FLU IMMUNIZE NO ADMIN: HCPCS | Performed by: INTERNAL MEDICINE

## 2021-11-16 PROCEDURE — 1036F TOBACCO NON-USER: CPT | Performed by: INTERNAL MEDICINE

## 2021-11-16 PROCEDURE — 4040F PNEUMOC VAC/ADMIN/RCVD: CPT | Performed by: INTERNAL MEDICINE

## 2021-11-16 ASSESSMENT — PATIENT HEALTH QUESTIONNAIRE - PHQ9
5. POOR APPETITE OR OVEREATING: 0
SUM OF ALL RESPONSES TO PHQ9 QUESTIONS 1 & 2: 3
10. IF YOU CHECKED OFF ANY PROBLEMS, HOW DIFFICULT HAVE THESE PROBLEMS MADE IT FOR YOU TO DO YOUR WORK, TAKE CARE OF THINGS AT HOME, OR GET ALONG WITH OTHER PEOPLE: 1
SUM OF ALL RESPONSES TO PHQ QUESTIONS 1-9: 3
9. THOUGHTS THAT YOU WOULD BE BETTER OFF DEAD, OR OF HURTING YOURSELF: 0
7. TROUBLE CONCENTRATING ON THINGS, SUCH AS READING THE NEWSPAPER OR WATCHING TELEVISION: 0
SUM OF ALL RESPONSES TO PHQ QUESTIONS 1-9: 3
3. TROUBLE FALLING OR STAYING ASLEEP: 0
2. FEELING DOWN, DEPRESSED OR HOPELESS: 2
8. MOVING OR SPEAKING SO SLOWLY THAT OTHER PEOPLE COULD HAVE NOTICED. OR THE OPPOSITE, BEING SO FIGETY OR RESTLESS THAT YOU HAVE BEEN MOVING AROUND A LOT MORE THAN USUAL: 0
SUM OF ALL RESPONSES TO PHQ QUESTIONS 1-9: 3
1. LITTLE INTEREST OR PLEASURE IN DOING THINGS: 1
4. FEELING TIRED OR HAVING LITTLE ENERGY: 0
6. FEELING BAD ABOUT YOURSELF - OR THAT YOU ARE A FAILURE OR HAVE LET YOURSELF OR YOUR FAMILY DOWN: 0

## 2021-11-16 NOTE — PROGRESS NOTES
Patient Name:  Ofelia Manjarrez  Patient :  1935  Patient MRN:  [id number]     Primary Oncologist: Corey Marsh MD  Referring Provider: Estela Ryan MD     Date of Service: 2021         Chief Complaint:    Chief Complaint   Patient presents with    Follow-up     He came in for follow up visit. Patient Active Problem List:     Melena     GI bleed     Acute kidney injury superimposed on CKD (HCC)     Stage 3b chronic kidney disease ([facility] [address]     Persistent proteinuria     Hypertensive heart and renal disease     History of anemia due to CKD     HPI:   Isabel Clements is a pleasant 81year old male patient who was referred for evaluation of anemia. On 2021 WBC was 5.9, hemoglobin 8.8, hematocrit 29.2, MCV 89.6, platelet 541. Peripheral smear showed anisocytosis, polychromasia, ovalocytes. Ferritin was 394, iron 23, TIBC 197, folate 12.9, B12 more than 2000. TSH was 0.06. Free T4 was normal at 1.31. Creatinine was 2.9, total protein 7.4, albumin 3.4. SPEP showed a faint monoclonal spike noted measuring 500 mg/dL. 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  This dominant lesion is decreased in size since  and is probably benign.  These renal lesions could be further evaluated with renal mass protocol MRI at patient's current renal function if clinically indicated. Bone density in 2020 showed osteopenia. I reviewed his records from epic. He has been anemic at least since . Hemoglobin in  was normal.  In 2020 he had episode of rectal bleed and received 3 units of packed red blood cell.   Reportedly he had upper and lower endoscopic study in the first part of 2021 by Dr. Hannah Brooks. 4-5 polyps were removed from the colon and he has hiatal hernia. Follow-up in for 5 years was recommended. Creatinine has slowly gone up since 2011. Anemia could be multifactorial.  He is on B12 supplement, Synthroid, Plavix. We discussed about bone marrow study within 2 weeks. He has chronic back pain for years related to degenerative joint disease. Bone marrow study in November 2021 showed  Final Pathologic Diagnosis:   A-B. Bone marrow, clot section and aspirate smears: -     Normocellular marrow with trilineage maturing hematopoiesis. -     Monotypic B-cell population is detected by flow cytometry (0.4% of total cells). 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 -     No definitive morphologic or flow cytometric evidence of a plasma cell neoplasm.   -     See comment. Peripheral blood smear:   -     Normocytic anemia with anisocytosis. -     Mild thrombocytopenia. Comment:   The monotypic B-cell population has a non-specific immunophenotype that can be seen in monoclonal B-cell lymphocytosis as well as B-cell lymphomas such as marginal zone lymphoma, lymphoplasmacytic lymphoma, and [id number] follicular lymphoma. Deletion of the long arm of chromosome 20 (20q) is a recurring abnormality in myeloid disorders such as MDS but is not considered to be a definitive evidence of myelodysplasia on its own. No definitive morphologic evidence of myelodysplasia is seen in the current specimen. Clinical followup is recommended. Bone marrow differential count:   Blasts:               3%   Promyelocytes:          <1%   Myelocytes:          10%   Metamyelocytes:     5%   Bands:               13%   Neutrophils:          12%   Lymphocytes:          13%   Monocytes:          3%   Eosinophils:          2%   Basophils:          <1%   Plasma cells:          2%   Nucleated RBCs:     36%     Flow cytometry: Monotypic B-cells, suspicious for a B-cell lymphoproliferative disorder. Karyotype: 46,XY,del(20)(q11.2q13.1)[5]/46,XY[16]     FISH:   Del(1p): Not Detected   Dup(1q): Not Detected   Gains(5, 9, 15): Not Detected   Del(13q)/-13: Not Detected   Del(17p)(TP53): Not Detected   IGH(Rearrangement): Not Detected     Serum LDH was 150. WBC 6.0, hemoglobin 8.4, MCV 85.4, platelet 740. TSH was 0.06. T4 was 1.31. Creatinine was 2.9. He is agreeable to have CT chest abdomen pelvis to assess for potential lymphoproliferative disorder. On December 6, 2020 when he came in for follow-up visit. CT CAP 11/30/2021:  1. No acute findings in the chest, abdomen or pelvis on this unenhanced  study.  Specifically no pathologically enlarged adenopathy in the chest, abdomen or pelvis. 2. Stable 4.1 cm Bosniak type IIF right renal cyst.  3. 3 mm subsolid nodule in left upper lobe, nonspecific.  This can be followed on subsequent surveillance CT. 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 We will continue to monitor closely. He is agreeable to have labs and CT chest prior to next office visit in 6 months. He will discuss with family doctor about the Plavix. Anemia could be multifactorial.  He has also an underlying chronic kidney disease. No acute pain. Denied any nausea, vomiting or diarrhea. No fever or chills. No chest pain, shortness of breath or palpitation. No headache or dizzy spell. No specific bone pain. No melena or hematochezia. Denied any dysuria or hematuria.     Past Medical History:   Past Medical History:   Diagnosis Date    Arthritis     CAD (coronary artery disease)     GERD (gastroesophageal reflux disease)     Hyperlipidemia     Hypertension     Kidney cysts     Right kidney    Thyroid disease     Unspecified cerebral artery occlusion with cerebral infarction       Past Surgery History:    Past Surgical History:   Procedure Laterality Date    CATARACT REMOVAL  2021    COLONOSCOPY  8/12/13    divertics    COLONOSCOPY N/A 2/8/2020    COLONOSCOPY DIAGNOSTIC performed by Marie Alicea MD at [address] [place] [address] ENDOSCOPY, COLON, DIAGNOSTIC  13    egd: normal    FRACTURE SURGERY      PROSTATE BIOPSY      UPPER GASTROINTESTINAL ENDOSCOPY N/A 2020    EGD DIAGNOSTIC ONLY performed by [facility] Alicea MD at [facility] ENDOSCOPY     Social History:   He denied any history of smoking. No alcohol or illicit drug use. He has 4 children. 2 daughters  of heart problem. 1 son  of heart problem. Family History:   1 living daughter had breast cancer. Review of Systems: The remainder of the review of system is unremarkable. Vital Signs: BP (!) 149/67 (Site: Right Upper Arm, Position: Sitting, Cuff Size: Medium Adult)   Pulse 66   Temp 97 °F (36.1 °C) (Infrared)   Ht 5' 11\" (1.803 m)   Wt 160 lb 3.2 oz (72.7 kg)   SpO2 96%   BMI 22.34 kg/m²      Physical Exam:  CONSTITUTIONAL: awake, alert, cooperative, no apparent distress   EYES: pupils equal, round and reactive to light, sclera clear and + pallor  ENT: Normocephalic, without obvious abnormality, atraumatic  NECK: supple, symmetrical, no jugular venous distension and no carotid bruits   HEMATOLOGIC/LYMPHATIC: no cervical, supraclavicular or axillary lymphadenopathy   LUNGS: no increased work of breathing and clear to auscultation   CARDIOVASCULAR: regular rate and rhythm, normal S1 and S2, no murmur noted  ABDOMEN: normal bowel sound, soft, non-distended, non-tender, no masses palpated, no hepatosplenomegaly   MUSCULOSKELETAL: full range of motion noted, tone is normal  NEUROLOGIC: awake, alert, oriented to name, place and time. Motor and sensory grossly intact. CN II through XII are grossly intact   SKIN: No jaundice. Appears intact. No ecchymosis or petechial rash.    EXTREMITIES: no LE edema  or cyanosis     Labs:  Hematology:  Lab Results   Component Value Date    WBC 6.0 10/27/2021    RBC 3.09 (L) 10/27/2021    HGB 8.4 (L) 10/27/2021    HCT 26.4 (L) 10/27/2021    MCV 85.4 10/27/2021    MCH 27.2 10/27/2021    MCHC 31.8 (L) 10/27/2021    RDW 15.0 (H) 10/27/2021     (L) 10/27/2021    MPV 11.0 10/27/2021    BANDSPCT 1 (L) 09/27/2021    SEGSPCT 44.8 10/27/2021    EOSRELPCT 2.3 10/27/2021    BASOPCT 0.2 10/27/2021    LYMPHOPCT 36.6 10/27/2021    MONOPCT 16.1 (H) 10/27/2021    BANDABS 0.06 09/27/2021    SEGSABS 2.7 10/27/2021    EOSABS 0.1 10/27/2021    BASOSABS 0.0 10/27/2021    LYMPHSABS 2.2 10/27/2021    MONOSABS 1.0 10/27/2021    DIFFTYPE AUTOMATED DIFFERENTIAL 10/27/2021    ANISOCYTOSIS 1+ 09/27/2021    POLYCHROM 1+ 09/27/2021    PLTM FEW 09/27/2021     Lab Results   Component Value Date    ESR 82 (H) 10/27/2021     Chemistry:  Lab Results   Component Value Date     09/27/2021    K 4.2 09/27/2021     09/27/2021    CO2 20 (L) 09/27/2021    BUN 47 (H) 09/27/2021    CREATININE 2.8 (H) 11/30/2021    GLUCOSE 132 (H) 09/27/2021    CALCIUM 8.8 09/27/2021    PROT 7.6 10/27/2021    LABALBU 3.4 09/27/2021    BILITOT 0.3 05/03/2021    ALKPHOS 75 05/03/2021    AST 13 (L) 05/03/2021    ALT 7 (L) 05/03/2021    LABGLOM 22 (L) 11/30/2021    GFRAA 26 (L) 11/30/2021    PHOS 3.1 09/27/2021     Lab Results   Component Value Date    TSHHS 0.006 (L) 09/27/2021    T4FREE 1.31 09/27/2021    FT3 2.5 08/27/2011     Immunology:  Lab Results   Component Value Date    PROT 7.6 10/27/2021    SPEP  10/27/2021     INTERPRETATION - A faint free lambda light chains monoclonal band is seen. 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  SAF    ALBUMINELP 3.2 10/27/2021    LABALPH 0.3 10/27/2021    LABALPH 0.9 10/27/2021    LABBETA 1.1 10/27/2021    GAMGLOB 2.1 (H) 10/27/2021     Coagulation Panel:  Lab Results   Component Value Date    PROTIME 14.7 (H) 02/08/2020    INR 1.21 02/08/2020    APTT 36.2 02/08/2020     Anemia Panel:  Lab Results   Component Value Date    AGYAETUB11 >2000 (H)

## 2021-11-16 NOTE — PROGRESS NOTES
MA Rooming Questions  Patient: Mikey Allan  MRN: [id number]    Date: 11/16/2021        1. Do you have any new issues?   no         2. Do you need any refills on medications?    no    3. Have you had any imaging done since your last visit?   no    4. Have you been hospitalized or seen in the emergency room since your last visit here?   no    5. Did the patient have a depression screening completed today?  Yes    PHQ-9 Total Score: 3 (11/16/2021 11:47 AM)  Thoughts that you would be better off dead, or of hurting yourself in some way: 0 (11/16/2021 11:47 AM)       PHQ-9 Given to (if applicable): Dr. Joni Apley              PHQ-9 Score (if applicable):                     [] Positive     [x]  Negative              Does question #9 need addressed (if applicable)                     [] Yes    [x]  No               Brit Rodriguez, CMA

## 2021-11-18 ENCOUNTER — TELEPHONE (OUTPATIENT)
Dept: ONCOLOGY | Age: 86
End: 2021-11-18

## 2021-11-18 NOTE — TELEPHONE ENCOUNTER
Pt is going to [facility] on 11/30 900 arrival for a 1030 appt-- npo 4 hours-- left message for pt with all information

## 2021-11-30 ENCOUNTER — CLINICAL DOCUMENTATION (OUTPATIENT)
Dept: ONCOLOGY | Age: 86
End: 2021-11-30

## 2021-11-30 ENCOUNTER — HOSPITAL ENCOUNTER (OUTPATIENT)
Dept: CT IMAGING | Age: 86
Discharge: HOME | End: 2021-11-30
Payer: MEDICARE

## 2021-11-30 DIAGNOSIS — D72.820 MONOCLONAL B-CELL LYMPHOCYTOSIS: ICD-10-CM

## 2021-11-30 LAB
GFR AFRICAN AMERICAN: 26 ML/MIN/1.73M2
GFR NON-AFRICAN AMERICAN: 22 ML/MIN/1.73M2
POC CREATININE: 2.8 MG/DL (ref 0.9–1.3)

## 2021-11-30 PROCEDURE — 71250 CT THORAX DX C-: CPT

## 2021-11-30 PROCEDURE — 74176 CT ABD & PELVIS W/O CONTRAST: CPT

## 2021-12-06 ENCOUNTER — HOSPITAL ENCOUNTER (OUTPATIENT)
Dept: INFUSION THERAPY | Age: 86
Discharge: HOME | End: 2021-12-06
Payer: MEDICARE

## 2021-12-06 ENCOUNTER — OFFICE VISIT (OUTPATIENT)
Dept: ONCOLOGY | Age: 86
End: 2021-12-06
Payer: MEDICARE

## 2021-12-06 VITALS
HEART RATE: 66 BPM | TEMPERATURE: 97 F | DIASTOLIC BLOOD PRESSURE: 67 MMHG | HEIGHT: 71 IN | SYSTOLIC BLOOD PRESSURE: 149 MMHG | WEIGHT: 160.2 LBS | OXYGEN SATURATION: 96 % | BODY MASS INDEX: 22.43 KG/M2

## 2021-12-06 DIAGNOSIS — D64.9 ANEMIA, UNSPECIFIED TYPE: Primary | ICD-10-CM

## 2021-12-06 DIAGNOSIS — R91.1 LUNG NODULE: ICD-10-CM

## 2021-12-06 DIAGNOSIS — D47.2 MGUS (MONOCLONAL GAMMOPATHY OF UNKNOWN SIGNIFICANCE): ICD-10-CM

## 2021-12-06 PROCEDURE — 99214 OFFICE O/P EST MOD 30 MIN: CPT | Performed by: INTERNAL MEDICINE

## 2021-12-06 PROCEDURE — G8420 CALC BMI NORM PARAMETERS: HCPCS | Performed by: INTERNAL MEDICINE

## 2021-12-06 PROCEDURE — G8427 DOCREV CUR MEDS BY ELIG CLIN: HCPCS | Performed by: INTERNAL MEDICINE

## 2021-12-06 PROCEDURE — 1036F TOBACCO NON-USER: CPT | Performed by: INTERNAL MEDICINE

## 2021-12-06 PROCEDURE — 99211 OFF/OP EST MAY X REQ PHY/QHP: CPT

## 2021-12-06 PROCEDURE — 1123F ACP DISCUSS/DSCN MKR DOCD: CPT | Performed by: INTERNAL MEDICINE

## 2021-12-06 PROCEDURE — G8484 FLU IMMUNIZE NO ADMIN: HCPCS | Performed by: INTERNAL MEDICINE

## 2021-12-06 PROCEDURE — 4040F PNEUMOC VAC/ADMIN/RCVD: CPT | Performed by: INTERNAL MEDICINE

## 2021-12-06 NOTE — PROGRESS NOTES
MA Rooming Questions  Patient: Aguilar Goncalves  MRN: [id number]    Date: 12/6/2021        1. Do you have any new issues?   no         2. Do you need any refills on medications?    no    3. Have you had any imaging done since your last visit? yes - CT scan    4. Have you been hospitalized or seen in the emergency room since your last visit here?   no    5. Did the patient have a depression screening completed today?  No    No data recorded     PHQ-9 Given to (if applicable):               PHQ-9 Score (if applicable):                     [] Positive     []  Negative              Does question #9 need addressed (if applicable)                     [] Yes    []  No               Judit Bone CMA

## 2021-12-29 ENCOUNTER — HOSPITAL ENCOUNTER (OUTPATIENT)
Age: 86
Discharge: HOME | End: 2021-12-29
Payer: MEDICARE

## 2021-12-29 LAB
ANION GAP SERPL CALCULATED.3IONS-SCNC: 12 MMOL/L (ref 4–16)
BUN BLDV-MCNC: 33 MG/DL (ref 6–23)
CALCIUM SERPL-MCNC: 8.9 MG/DL (ref 8.3–10.6)
CHLORIDE BLD-SCNC: 104 MMOL/L (ref 99–110)
CO2: 24 MMOL/L (ref 21–32)
CREAT SERPL-MCNC: 2.5 MG/DL (ref 0.9–1.3)
GFR AFRICAN AMERICAN: 30 ML/MIN/1.73M2
GFR NON-AFRICAN AMERICAN: 25 ML/MIN/1.73M2
GLUCOSE BLD-MCNC: 89 MG/DL (ref 70–99)
POTASSIUM SERPL-SCNC: 4.5 MMOL/L (ref 3.5–5.1)
SODIUM BLD-SCNC: 140 MMOL/L (ref 135–145)
T4 FREE: 1.4 NG/DL (ref 0.9–1.8)
TSH HIGH SENSITIVITY: 0.01 UIU/ML (ref 0.27–4.2)
VITAMIN D 25-HYDROXY: 60 NG/ML

## 2021-12-29 PROCEDURE — 80048 BASIC METABOLIC PNL TOTAL CA: CPT

## 2021-12-29 PROCEDURE — 36415 COLL VENOUS BLD VENIPUNCTURE: CPT

## 2021-12-29 PROCEDURE — 82306 VITAMIN D 25 HYDROXY: CPT

## 2021-12-29 PROCEDURE — 84443 ASSAY THYROID STIM HORMONE: CPT

## 2021-12-29 PROCEDURE — 84439 ASSAY OF FREE THYROXINE: CPT

## 2022-01-10 ENCOUNTER — HOSPITAL ENCOUNTER (OUTPATIENT)
Age: 87
Discharge: HOME | End: 2022-01-10
Payer: MEDICARE

## 2022-01-10 DIAGNOSIS — D63.1 ANEMIA IN CHRONIC KIDNEY DISEASE, UNSPECIFIED CKD STAGE: ICD-10-CM

## 2022-01-10 DIAGNOSIS — N18.9 ANEMIA IN CHRONIC KIDNEY DISEASE, UNSPECIFIED CKD STAGE: ICD-10-CM

## 2022-01-10 LAB
ANION GAP SERPL CALCULATED.3IONS-SCNC: 9 MMOL/L (ref 4–16)
BUN BLDV-MCNC: 35 MG/DL (ref 6–23)
CALCIUM SERPL-MCNC: 9 MG/DL (ref 8.3–10.6)
CHLORIDE BLD-SCNC: 108 MMOL/L (ref 99–110)
CO2: 25 MMOL/L (ref 21–32)
CREAT SERPL-MCNC: 2.6 MG/DL (ref 0.9–1.3)
GFR AFRICAN AMERICAN: 29 ML/MIN/1.73M2
GFR NON-AFRICAN AMERICAN: 24 ML/MIN/1.73M2
GLUCOSE BLD-MCNC: 104 MG/DL (ref 70–99)
POTASSIUM SERPL-SCNC: 4.1 MMOL/L (ref 3.5–5.1)
SODIUM BLD-SCNC: 142 MMOL/L (ref 135–145)

## 2022-01-10 PROCEDURE — 80048 BASIC METABOLIC PNL TOTAL CA: CPT

## 2022-01-10 PROCEDURE — 36415 COLL VENOUS BLD VENIPUNCTURE: CPT

## 2022-01-27 ENCOUNTER — HOSPITAL ENCOUNTER (OUTPATIENT)
Dept: ULTRASOUND IMAGING | Age: 87
Discharge: HOME | End: 2022-01-27
Payer: MEDICARE

## 2022-01-27 DIAGNOSIS — N28.1 ACQUIRED CYST OF KIDNEY: ICD-10-CM

## 2022-01-27 PROCEDURE — 76775 US EXAM ABDO BACK WALL LIM: CPT

## 2022-01-28 ENCOUNTER — TELEPHONE (OUTPATIENT)
Dept: ONCOLOGY | Age: 87
End: 2022-01-28

## 2022-01-28 NOTE — TELEPHONE ENCOUNTER
Pt is going to [facility] on 6/2 130 arrival for a 200 appt-- no prep-- pt is aware of appt and stated understanding

## 2022-04-05 ENCOUNTER — HOSPITAL ENCOUNTER (OUTPATIENT)
Age: 87
Discharge: HOME | End: 2022-04-05
Payer: MEDICARE

## 2022-04-05 LAB
ANION GAP SERPL CALCULATED.3IONS-SCNC: 12 MMOL/L (ref 4–16)
BUN BLDV-MCNC: 31 MG/DL (ref 6–23)
CALCIUM SERPL-MCNC: 8.6 MG/DL (ref 8.3–10.6)
CHLORIDE BLD-SCNC: 103 MMOL/L (ref 99–110)
CO2: 22 MMOL/L (ref 21–32)
CREAT SERPL-MCNC: 2.8 MG/DL (ref 0.9–1.3)
GFR AFRICAN AMERICAN: 26 ML/MIN/1.73M2
GFR NON-AFRICAN AMERICAN: 22 ML/MIN/1.73M2
GLUCOSE BLD-MCNC: 89 MG/DL (ref 70–99)
POTASSIUM SERPL-SCNC: 4.2 MMOL/L (ref 3.5–5.1)
SODIUM BLD-SCNC: 137 MMOL/L (ref 135–145)
TSH HIGH SENSITIVITY: <0.01 UIU/ML (ref 0.27–4.2)

## 2022-04-05 PROCEDURE — 80048 BASIC METABOLIC PNL TOTAL CA: CPT

## 2022-04-05 PROCEDURE — 84443 ASSAY THYROID STIM HORMONE: CPT

## 2022-04-05 PROCEDURE — 36415 COLL VENOUS BLD VENIPUNCTURE: CPT

## 2022-05-09 NOTE — PROGRESS NOTES
Patient Name:  Kathy Pierce  Patient :  1935  Patient MRN:  [id number]     Primary Oncologist: Ramez Carr MD  Referring Provider: Ayanna Smyth MD     Date of Service: 2022       Chief Complaint:    Chief Complaint   Patient presents with    Follow-up     He came in for follow up visit. Patient Active Problem List:     Stage 3b chronic kidney disease      Persistent proteinuria     Hypertensive heart and renal disease     History of anemia due to CKD     HPI:   Prema Chacon is a pleasant 81year old male patient who was referred for evaluation of anemia. On 2021 WBC was 5.9, hemoglobin 8.8, hematocrit 29.2, MCV 89.6, platelet 876. Peripheral smear showed anisocytosis, polychromasia, ovalocytes. Ferritin was 394, iron 23, TIBC 197, folate 12.9, B12 more than 2000. TSH was 0.06. Free T4 was normal at 1.31. Creatinine was 2.9, total protein 7.4, albumin 3.4. SPEP showed a faint monoclonal spike noted measuring 500 mg/dL. VERNELL showed faint monoclonal free lambda light chain proteins. 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  This dominant lesion is decreased in size since  and is probably benign.  These renal lesions could be further evaluated with renal mass protocol MRI at patient's current renal function if clinically indicated. Bone density in 2020 showed osteopenia. I reviewed his records from epic. He has been anemic at least since . Hemoglobin in  was normal.  In 2020 he had episode of rectal bleed and received 3 units of packed red blood cell. Reportedly he had upper and lower endoscopic study in the first part of  by Dr. Brad Brooks.   4-5 polyps were removed from the colon and he has hiatal hernia. Follow-up in for 5 years was recommended. Creatinine has slowly gone up since 2011. Anemia could be multifactorial.  He is on B12 supplement, Synthroid, Plavix. We discussed about bone marrow study within 2 weeks. He has chronic back pain for years related to degenerative joint disease. Bone marrow study in November 2021 showed  Final Pathologic Diagnosis:   A-B. Bone marrow, clot section and aspirate smears: -     Normocellular marrow with trilineage maturing hematopoiesis. -     Monotypic B-cell population is detected by flow cytometry (0.4% of total cells). 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 -     No definitive morphologic or flow cytometric evidence of a plasma cell neoplasm.   -     See comment. Peripheral blood smear:   -     Normocytic anemia with anisocytosis. -     Mild thrombocytopenia. Comment:   The monotypic B-cell population has a non-specific immunophenotype that can be seen in monoclonal B-cell lymphocytosis as well as B-cell lymphomas such as marginal zone lymphoma, lymphoplasmacytic lymphoma, and [id number] follicular lymphoma. Deletion of the long arm of chromosome 20 (20q) is a recurring abnormality in myeloid disorders such as MDS but is not considered to be a definitive evidence of myelodysplasia on its own. No definitive morphologic evidence of myelodysplasia is seen in the current specimen. Clinical followup is recommended. Bone marrow differential count:   Blasts:               3%     Flow cytometry: Monotypic B-cells, suspicious for a B-cell lymphoproliferative disorder. Karyotype: 46,XY,del(20)(q11.2q13.1)[5]/46,XY[16]     FISH:   Del(1p): Not Detected   Dup(1q): Not Detected   Gains(5, 9, 15): Not Detected   Del(13q)/-13: Not Detected   Del(17p)(TP53): Not Detected   IGH(Rearrangement): Not Detected     Serum LDH was 150. WBC 6.0, hemoglobin 8.4, MCV 85.4, platelet 830. TSH was 0.06. T4 was 1.31. Creatinine was 2.9.   He is agreeable to have CT chest abdomen pelvis to assess for potential lymphoproliferative disorder. CT CAP 11/30/2021:  1. No acute findings in the chest, abdomen or pelvis on this unenhanced study.  Specifically no pathologically enlarged adenopathy in the chest, abdomen or pelvis. 2. Stable 4.1 cm Bosniak type IIF right renal cyst.  3. 3 mm subsolid nodule in left upper lobe, nonspecific.  This can be followed on subsequent surveillance CT. 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 We will continue to monitor closely. He is agreeable to have labs and CT chest prior to next office visit in 6 months. He will discuss with family doctor about the Plavix. Anemia could be multifactorial.  He has also an underlying chronic kidney disease. On 6/6/2022 he came in for follow up visit. In May 2022 creatinine was 2.2 Hg 8.6, WBC 5.4 and platelet 031. Absolute lymphocytes was 2.1. Chest on June 2, 2022 showed  Stable ground-glass nodular opacities in the left upper lobe.  No new airspace disease identified.   Attention to on follow-up imaging is recommended.     RECOMMENDATIONS:  Fleischner Society guidelines for follow-up and management of incidentally detected subsolid pulmonary nodules:     Multiple subsolid nodules  >6 mm - CT at 3-6 months. If stable, consider CT at 2 and 4 years. > than or equal to 6 mm - CT at 3-6 months. Subsequent management based on the most suspicious nodule(s).    - Low risk patients include individuals with minimal or absent history of smoking and other known risk factors.    - High risk patients include individuals with a history or smoking or known risk factors.   5/31/2022 13:20  Ferritin: 245 Iron: 53 (L) TIBC: 244 (L) Transferrin %: 22  He is agreeable to have labs and follow-up CT chest prior to next office visit in 6 months.    5/31/2022   Lambda Free Light Chains QNT: 107.35 (H)  Kappa/Lambda Fluid C Ratio: 1.03  SPE/VERNELL Interpretation:   VERNELL gel shows a faint band in IgG lambda suggestive of a specific immune response or an early monoclonal protein. Close clinical correlation with VERNELL follow-up is suggested, if clinically indicated. Denied any drenching night sweats or weight loss. He is agreeable to continue with observation. I will have her repeat SPEP, immunofixation study, serum light chain study prior to next office visit. Also I will order CBC, CMP & LDH. He denied any bone pain to suggest multiple myeloma. Denied any nausea, vomiting or diarrhea. No fever or chills. No chest pain, shortness of breath or palpitation. No headache or dizzy spell. No melena or hematochezia. Denied any dysuria or hematuria. Past Medical History:   Past Medical History:   Diagnosis Date    Arthritis     CAD (coronary artery disease)     GERD (gastroesophageal reflux disease)     Hyperlipidemia     Hypertension     Kidney cysts     Right kidney    Thyroid disease     Unspecified cerebral artery occlusion with cerebral infarction       Past Surgery History:    Past Surgical History:   Procedure Laterality Date    CATARACT REMOVAL      COLONOSCOPY  13    divertics    COLONOSCOPY N/A 2020    COLONOSCOPY DIAGNOSTIC performed by King Joseph MD at [address] [place] [address] ENDOSCOPY, COLON, DIAGNOSTIC  13    egd: normal    FRACTURE SURGERY      PROSTATE BIOPSY      UPPER GASTROINTESTINAL ENDOSCOPY N/A 2020    EGD DIAGNOSTIC ONLY performed by King Joseph MD at [facility] ENDOSCOPY     Social History:   He denied any history of smoking. No alcohol or illicit drug use. He has 4 children. 2 daughters  of heart problem. 1 son  of heart problem. Family History:   1 living daughter had breast cancer. Review of Systems: The remainder of the review of system is unremarkable.      Vital Signs: BP (!) 109/59 (Site: Right Upper Arm, Position: Sitting, Cuff Size: Medium Adult)   Pulse 63   Temp 97.5 °F (36.4 °C) (Temporal)   Ht 5' 11\" (1.803 m)   Wt 153 lb (69.4 kg)   SpO2 96%   BMI 21.34 kg/m²      Physical Exam:  CONSTITUTIONAL: awake, alert, cooperative, no apparent distress   EYES: pupils equal, round and reactive to light, sclera clear and + pallor  ENT: Normocephalic, without obvious abnormality, atraumatic  NECK: supple, symmetrical, no jugular venous distension and no carotid bruits   HEMATOLOGIC/LYMPHATIC: no cervical, supraclavicular or axillary lymphadenopathy   LUNGS: no increased work of breathing and clear to auscultation   CARDIOVASCULAR: regular rate and rhythm, normal S1 and S2, no murmur noted  ABDOMEN: normal bowel sound, soft, non-distended, non-tender, palpable mass  MUSCULOSKELETAL: full range of motion noted, tone is normal  NEUROLOGIC: Motor and sensory grossly intact. CN II - XII  grossly intact   SKIN: No jaundice. No ecchymosis.    EXTREMITIES: no LE edema  or cyanosis     Labs:  Hematology:  Lab Results   Component Value Date    WBC 5.4 05/31/2022    RBC 3.10 (L) 05/31/2022    HGB 8.6 (L) 05/31/2022    HCT 28.4 (L) 05/31/2022    MCV 91.6 05/31/2022    MCH 27.7 05/31/2022    MCHC 30.3 (L) 05/31/2022    RDW 14.7 05/31/2022     (L) 05/31/2022    MPV 10.5 05/31/2022    BANDSPCT 2 (L) 05/31/2022    SEGSPCT 51.0 05/31/2022    EOSRELPCT 1.0 05/31/2022    BASOPCT 1.0 05/31/2022    LYMPHOPCT 39.0 05/31/2022    MONOPCT 5.0 (H) 05/31/2022    BANDABS 0.11 05/31/2022    SEGSABS 2.6 05/31/2022    EOSABS 0.1 05/31/2022    BASOSABS 0.1 05/31/2022    LYMPHSABS 2.1 05/31/2022    MONOSABS 0.3 05/31/2022    DIFFTYPE MANUAL DIFFERENTIAL 05/31/2022    ANISOCYTOSIS 1+ 09/27/2021    POLYCHROM 1+ 09/27/2021    PLTM FEW 09/27/2021     Lab Results   Component Value Date    ESR 82 (H) 10/27/2021     Chemistry:  Lab Results   Component Value Date     05/31/2022    K 4.2 05/31/2022     05/31/2022    CO2 24 05/31/2022    BUN 37 (H) 05/31/2022    CREATININE 2.6 (H) 05/31/2022    GLUCOSE 85 05/31/2022 CALCIUM 8.9 05/31/2022    PROT 7.4 05/31/2022    PROT 7.3 05/31/2022    LABALBU 3.8 05/31/2022    LABALBU 3.39 (L) 05/31/2022    BILITOT 0.3 05/31/2022    ALKPHOS 83 05/31/2022    AST 14 (L) 05/31/2022    ALT 8 (L) 05/31/2022    LABGLOM 24 (L) 05/31/2022    GFRAA 29 (L) 05/31/2022    PHOS 3.4 05/16/2022     Lab Results   Component Value Date    TSHHS <0.010 (L) 04/05/2022    T4FREE 1.40 12/29/2021    FT3 2.5 08/27/2011     Immunology:  Lab Results   Component Value Date    PROT 7.4 05/31/2022    PROT 7.3 05/31/2022    SPEP  10/27/2021     INTERPRETATION - A faint free lambda light chains monoclonal band is seen. 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 SAF    ALBUMINELP 3.2 10/27/2021    LABALPH 0.40 05/31/2022    LABALPH 0.73 05/31/2022    LABBETA 1.02 05/31/2022    GAMGLOB 2.1 (H) 10/27/2021     Coagulation Panel:  Lab Results   Component Value Date    PROTIME 14.7 (H) 02/08/2020    INR 1.21 02/08/2020    APTT 36.2 02/08/2020     Anemia Panel:  Lab Results   Component Value Date    PXSHGBYW81 >2000 (H) 09/27/2021    FOLATE 12.9 09/27/2021      Observations:  PHQ-9 Total Score: 2 (6/6/2022  2:11 PM)     Assessment & Plan:  1. He has chronic anemia. Plavix was stopped. He had upper and lower endoscopic study in the first part of 2021. BM Study in November 2021 was grossly unremarkable except for small monoclonal B-cell lymphocytosis. CT chest abdomen pelvis in summer 2021 were reviewed. .  Likely anemia is multifactorial. LDH was unremarkable. I will continue to monitor CBC. 2.  He has chronic kidney disease. He has 1: Gammopathy of unknown significance. I will continue to monitor SPEP, serum light chain study. 3.  He has CVA. He is on Plavix. 4. He has lung nodule. I schedule follow-up CT chest prior to next office visit in 6 months to reassess the left lung nodule. Return to clinic in 6 months.  All of his questions has been answered for today. I have discussed the above stated plan with the patient and they verbalized understanding and agreed with the plan.

## 2022-05-16 ENCOUNTER — HOSPITAL ENCOUNTER (OUTPATIENT)
Age: 87
Discharge: HOME | End: 2022-05-16
Payer: MEDICARE

## 2022-05-16 DIAGNOSIS — N18.32 STAGE 3B CHRONIC KIDNEY DISEASE (HCC): ICD-10-CM

## 2022-05-16 DIAGNOSIS — N18.9 ACUTE KIDNEY INJURY SUPERIMPOSED ON CKD (HCC): ICD-10-CM

## 2022-05-16 DIAGNOSIS — N17.9 ACUTE KIDNEY INJURY SUPERIMPOSED ON CKD (HCC): ICD-10-CM

## 2022-05-16 LAB
ALBUMIN SERPL-MCNC: 3.7 GM/DL (ref 3.4–5)
ANION GAP SERPL CALCULATED.3IONS-SCNC: 13 MMOL/L (ref 4–16)
BUN BLDV-MCNC: 45 MG/DL (ref 6–23)
CALCIUM SERPL-MCNC: 8.7 MG/DL (ref 8.3–10.6)
CHLORIDE BLD-SCNC: 106 MMOL/L (ref 99–110)
CO2: 20 MMOL/L (ref 21–32)
CREAT SERPL-MCNC: 2.7 MG/DL (ref 0.9–1.3)
GFR AFRICAN AMERICAN: 27 ML/MIN/1.73M2
GFR NON-AFRICAN AMERICAN: 23 ML/MIN/1.73M2
GLUCOSE BLD-MCNC: 112 MG/DL (ref 70–99)
PHOSPHORUS: 3.4 MG/DL (ref 2.5–4.9)
POTASSIUM SERPL-SCNC: 3.7 MMOL/L (ref 3.5–5.1)
SODIUM BLD-SCNC: 139 MMOL/L (ref 135–145)

## 2022-05-16 PROCEDURE — 36415 COLL VENOUS BLD VENIPUNCTURE: CPT

## 2022-05-16 PROCEDURE — 80069 RENAL FUNCTION PANEL: CPT

## 2022-05-31 ENCOUNTER — TELEPHONE (OUTPATIENT)
Dept: ONCOLOGY | Age: 87
End: 2022-05-31

## 2022-05-31 ENCOUNTER — HOSPITAL ENCOUNTER (OUTPATIENT)
Age: 87
Discharge: HOME | End: 2022-05-31
Payer: MEDICARE

## 2022-05-31 DIAGNOSIS — R91.1 LUNG NODULE: ICD-10-CM

## 2022-05-31 DIAGNOSIS — D47.2 MGUS (MONOCLONAL GAMMOPATHY OF UNKNOWN SIGNIFICANCE): ICD-10-CM

## 2022-05-31 DIAGNOSIS — D64.9 ANEMIA, UNSPECIFIED TYPE: ICD-10-CM

## 2022-05-31 LAB
ALBUMIN SERPL-MCNC: 3.8 GM/DL (ref 3.4–5)
ALP BLD-CCNC: 83 IU/L (ref 40–129)
ALT SERPL-CCNC: 8 U/L (ref 10–40)
ANION GAP SERPL CALCULATED.3IONS-SCNC: 10 MMOL/L (ref 4–16)
AST SERPL-CCNC: 14 IU/L (ref 15–37)
BANDED NEUTROPHILS ABSOLUTE COUNT: 0.11 K/CU MM
BANDED NEUTROPHILS RELATIVE PERCENT: 2 % (ref 5–11)
BASOPHILS ABSOLUTE: 0.1 K/CU MM
BASOPHILS RELATIVE PERCENT: 1 % (ref 0–1)
BILIRUB SERPL-MCNC: 0.3 MG/DL (ref 0–1)
BUN BLDV-MCNC: 37 MG/DL (ref 6–23)
CALCIUM SERPL-MCNC: 8.9 MG/DL (ref 8.3–10.6)
CHLORIDE BLD-SCNC: 106 MMOL/L (ref 99–110)
CO2: 24 MMOL/L (ref 21–32)
CREAT SERPL-MCNC: 2.6 MG/DL (ref 0.9–1.3)
DIFFERENTIAL TYPE: ABNORMAL
EOSINOPHILS ABSOLUTE: 0.1 K/CU MM
EOSINOPHILS RELATIVE PERCENT: 1 % (ref 0–3)
FERRITIN: 245 NG/ML (ref 30–400)
GFR AFRICAN AMERICAN: 29 ML/MIN/1.73M2
GFR NON-AFRICAN AMERICAN: 24 ML/MIN/1.73M2
GLUCOSE BLD-MCNC: 85 MG/DL (ref 70–99)
HCT VFR BLD CALC: 28.4 % (ref 42–52)
HELMET CELLS: ABNORMAL
HEMOGLOBIN: 8.6 GM/DL (ref 13.5–18)
HYPOCHROMIA: ABNORMAL
IRON: 53 UG/DL (ref 59–158)
LACTATE DEHYDROGENASE: 153 IU/L (ref 120–246)
LYMPHOCYTES ABSOLUTE: 2.1 K/CU MM
LYMPHOCYTES RELATIVE PERCENT: 39 % (ref 24–44)
MCH RBC QN AUTO: 27.7 PG (ref 27–31)
MCHC RBC AUTO-ENTMCNC: 30.3 % (ref 32–36)
MCV RBC AUTO: 91.6 FL (ref 78–100)
MONOCYTES ABSOLUTE: 0.3 K/CU MM
MONOCYTES RELATIVE PERCENT: 5 % (ref 0–4)
MYELOCYTE PERCENT: 1 %
MYELOCYTES ABSOLUTE COUNT: 0.05 K/CU MM
OVALOCYTES: ABNORMAL
PCT TRANSFERRIN: 22 % (ref 10–44)
PDW BLD-RTO: 14.7 % (ref 11.7–14.9)
PLATELET # BLD: 104 K/CU MM (ref 140–440)
PMV BLD AUTO: 10.5 FL (ref 7.5–11.1)
POTASSIUM SERPL-SCNC: 4.2 MMOL/L (ref 3.5–5.1)
RBC # BLD: 3.1 M/CU MM (ref 4.6–6.2)
SCHISTOCYTES: ABNORMAL
SEGMENTED NEUTROPHILS ABSOLUTE COUNT: 2.6 K/CU MM
SEGMENTED NEUTROPHILS RELATIVE PERCENT: 51 % (ref 36–66)
SODIUM BLD-SCNC: 140 MMOL/L (ref 135–145)
TEAR DROP CELLS: ABNORMAL
TOTAL IRON BINDING CAPACITY: 244 UG/DL (ref 250–450)
TOTAL PROTEIN: 7.4 GM/DL (ref 6.4–8.2)
UNSATURATED IRON BINDING CAPACITY: 191 UG/DL (ref 110–370)
WBC # BLD: 5.4 K/CU MM (ref 4–10.5)

## 2022-05-31 PROCEDURE — 82232 ASSAY OF BETA-2 PROTEIN: CPT

## 2022-05-31 PROCEDURE — 84155 ASSAY OF PROTEIN SERUM: CPT

## 2022-05-31 PROCEDURE — 83540 ASSAY OF IRON: CPT

## 2022-05-31 PROCEDURE — 85027 COMPLETE CBC AUTOMATED: CPT

## 2022-05-31 PROCEDURE — 82668 ASSAY OF ERYTHROPOIETIN: CPT

## 2022-05-31 PROCEDURE — 83550 IRON BINDING TEST: CPT

## 2022-05-31 PROCEDURE — 83883 ASSAY NEPHELOMETRY NOT SPEC: CPT

## 2022-05-31 PROCEDURE — 80053 COMPREHEN METABOLIC PANEL: CPT

## 2022-05-31 PROCEDURE — 84165 PROTEIN E-PHORESIS SERUM: CPT

## 2022-05-31 PROCEDURE — 86320 SERUM IMMUNOELECTROPHORESIS: CPT

## 2022-05-31 PROCEDURE — 86334 IMMUNOFIX E-PHORESIS SERUM: CPT

## 2022-05-31 PROCEDURE — 83615 LACTATE (LD) (LDH) ENZYME: CPT

## 2022-05-31 PROCEDURE — 85007 BL SMEAR W/DIFF WBC COUNT: CPT

## 2022-05-31 PROCEDURE — 82728 ASSAY OF FERRITIN: CPT

## 2022-05-31 PROCEDURE — 36415 COLL VENOUS BLD VENIPUNCTURE: CPT

## 2022-05-31 NOTE — TELEPHONE ENCOUNTER
Pt called to clarify what type of scan he is scheduled for. His lab work appt reminder card just stated scan.

## 2022-06-02 ENCOUNTER — HOSPITAL ENCOUNTER (OUTPATIENT)
Dept: CT IMAGING | Age: 87
Discharge: HOME | End: 2022-06-02
Payer: MEDICARE

## 2022-06-02 DIAGNOSIS — R91.1 LUNG NODULE: ICD-10-CM

## 2022-06-02 LAB
BETA-2 MICROGLOBULIN: 10.3 MG/L
ERYTHROPOIETIN: 11 MU/ML (ref 4–27)
KAPPA QUANT FREE LIGHT CHAINS: 110.95 MG/L (ref 3.3–19.4)
KAPPA/LAMBDA FREE LIGHT CHAIN RATIO: 1.03 (ref 0.26–1.65)
LAMBDA FREE LIGHT CHAINS QNT: 107.35 MG/L (ref 5.71–26.3)

## 2022-06-02 PROCEDURE — 71250 CT THORAX DX C-: CPT

## 2022-06-03 LAB
EER IMMUNOFIX ELECTROPHORESIS GEL: NORMAL
IMMUNOFIX ELECTROPHORESIS GEL: NORMAL

## 2022-06-04 LAB
ALBUMIN SERPL-MCNC: 3.39 G/DL (ref 3.75–5.01)
ALPHA-1-GLOBULIN: 0.4 G/DL (ref 0.19–0.46)
ALPHA-2-GLOBULIN: 0.73 G/DL (ref 0.48–1.05)
BETA GLOBULIN: 1.02 G/DL (ref 0.48–1.1)
GAMMA: 1.75 G/DL (ref 0.62–1.51)
IMMUNOFIXATION REFLEX: ABNORMAL
PROTEIN ELECTROPHORESIS, SERUM: ABNORMAL
SPE/IFE INTERPRETATION: ABNORMAL
TOTAL PROTEIN: 7.3 G/DL (ref 6.3–8.2)

## 2022-06-06 ENCOUNTER — OFFICE VISIT (OUTPATIENT)
Dept: ONCOLOGY | Age: 87
End: 2022-06-06
Payer: MEDICARE

## 2022-06-06 ENCOUNTER — HOSPITAL ENCOUNTER (OUTPATIENT)
Dept: INFUSION THERAPY | Age: 87
Discharge: HOME | End: 2022-06-06

## 2022-06-06 VITALS
HEART RATE: 63 BPM | TEMPERATURE: 97.5 F | WEIGHT: 153 LBS | HEIGHT: 71 IN | OXYGEN SATURATION: 96 % | BODY MASS INDEX: 21.42 KG/M2 | SYSTOLIC BLOOD PRESSURE: 109 MMHG | DIASTOLIC BLOOD PRESSURE: 59 MMHG

## 2022-06-06 DIAGNOSIS — R91.1 LUNG NODULE: Primary | ICD-10-CM

## 2022-06-06 DIAGNOSIS — D47.2 MGUS (MONOCLONAL GAMMOPATHY OF UNKNOWN SIGNIFICANCE): ICD-10-CM

## 2022-06-06 PROCEDURE — G8420 CALC BMI NORM PARAMETERS: HCPCS | Performed by: INTERNAL MEDICINE

## 2022-06-06 PROCEDURE — 1123F ACP DISCUSS/DSCN MKR DOCD: CPT | Performed by: INTERNAL MEDICINE

## 2022-06-06 PROCEDURE — 99214 OFFICE O/P EST MOD 30 MIN: CPT | Performed by: INTERNAL MEDICINE

## 2022-06-06 PROCEDURE — G8427 DOCREV CUR MEDS BY ELIG CLIN: HCPCS | Performed by: INTERNAL MEDICINE

## 2022-06-06 PROCEDURE — 1036F TOBACCO NON-USER: CPT | Performed by: INTERNAL MEDICINE

## 2022-06-06 RX ORDER — LEVOTHYROXINE SODIUM 0.07 MG/1
TABLET ORAL
COMMUNITY
Start: 2022-05-04

## 2022-06-06 RX ORDER — OMEPRAZOLE 40 MG/1
CAPSULE, DELAYED RELEASE ORAL
COMMUNITY
Start: 2022-03-21 | End: 2022-09-19

## 2022-06-06 ASSESSMENT — PATIENT HEALTH QUESTIONNAIRE - PHQ9
1. LITTLE INTEREST OR PLEASURE IN DOING THINGS: 0
SUM OF ALL RESPONSES TO PHQ QUESTIONS 1-9: 2
SUM OF ALL RESPONSES TO PHQ QUESTIONS 1-9: 2
SUM OF ALL RESPONSES TO PHQ9 QUESTIONS 1 & 2: 2
SUM OF ALL RESPONSES TO PHQ QUESTIONS 1-9: 2
2. FEELING DOWN, DEPRESSED OR HOPELESS: 2
SUM OF ALL RESPONSES TO PHQ QUESTIONS 1-9: 2

## 2022-06-06 NOTE — PROGRESS NOTES
MA Rooming Questions  Patient: Susan Galeana  MRN: [id number]    Date: 6/6/2022        1. Do you have any new issues?   no         2. Do you need any refills on medications?    no    3. Have you had any imaging done since your last visit? yes - labs 5/31 and ct 6/2    4. Have you been hospitalized or seen in the emergency room since your last visit here?   no    5. Did the patient have a depression screening completed today?  Yes    PHQ-9 Total Score: 2 (6/6/2022  2:11 PM)       PHQ-9 Given to (if applicable):               PHQ-9 Score (if applicable):                     [] Positive     []  Negative              Does question #9 need addressed (if applicable)                     [] Yes    []  No               Екатерина Dumont CMA

## 2022-07-13 ENCOUNTER — HOSPITAL ENCOUNTER (OUTPATIENT)
Dept: GENERAL RADIOLOGY | Age: 87
Discharge: HOME | End: 2022-07-13
Payer: MEDICARE

## 2022-07-13 ENCOUNTER — HOSPITAL ENCOUNTER (OUTPATIENT)
Age: 87
Discharge: HOME | End: 2022-07-13
Payer: MEDICARE

## 2022-07-13 DIAGNOSIS — M54.9 DORSALGIA: ICD-10-CM

## 2022-07-13 PROCEDURE — 72080 X-RAY EXAM THORACOLMB 2/> VW: CPT

## 2022-08-13 ENCOUNTER — APPOINTMENT (OUTPATIENT)
Dept: GENERAL RADIOLOGY | Age: 87
DRG: 841 | End: 2022-08-13
Payer: MEDICARE

## 2022-08-13 ENCOUNTER — APPOINTMENT (OUTPATIENT)
Dept: CT IMAGING | Age: 87
DRG: 841 | End: 2022-08-13
Payer: MEDICARE

## 2022-08-13 ENCOUNTER — HOSPITAL ENCOUNTER (EMERGENCY)
Age: 87
Discharge: HOME | DRG: 841 | End: 2022-08-13
Attending: EMERGENCY MEDICINE
Payer: MEDICARE

## 2022-08-13 VITALS
HEIGHT: 71 IN | RESPIRATION RATE: 16 BRPM | DIASTOLIC BLOOD PRESSURE: 72 MMHG | BODY MASS INDEX: 21 KG/M2 | OXYGEN SATURATION: 100 % | SYSTOLIC BLOOD PRESSURE: 187 MMHG | TEMPERATURE: 98.5 F | WEIGHT: 150 LBS | HEART RATE: 62 BPM

## 2022-08-13 DIAGNOSIS — K59.00 CONSTIPATION, UNSPECIFIED CONSTIPATION TYPE: Primary | ICD-10-CM

## 2022-08-13 LAB
ALBUMIN SERPL-MCNC: 4 GM/DL (ref 3.4–5)
ALP BLD-CCNC: 74 IU/L (ref 40–129)
ALT SERPL-CCNC: 8 U/L (ref 10–40)
ANION GAP SERPL CALCULATED.3IONS-SCNC: 11 MMOL/L (ref 4–16)
AST SERPL-CCNC: 15 IU/L (ref 15–37)
BACTERIA: NEGATIVE /HPF
BASOPHILS ABSOLUTE: 0 K/CU MM
BASOPHILS RELATIVE PERCENT: 0.5 % (ref 0–1)
BILIRUB SERPL-MCNC: 0.3 MG/DL (ref 0–1)
BILIRUBIN URINE: NEGATIVE MG/DL
BLOOD, URINE: ABNORMAL
BUN BLDV-MCNC: 34 MG/DL (ref 6–23)
CALCIUM SERPL-MCNC: 9.4 MG/DL (ref 8.3–10.6)
CHLORIDE BLD-SCNC: 103 MMOL/L (ref 99–110)
CLARITY: CLEAR
CO2: 23 MMOL/L (ref 21–32)
COLOR: YELLOW
CREAT SERPL-MCNC: 2.3 MG/DL (ref 0.9–1.3)
DIFFERENTIAL TYPE: ABNORMAL
EKG ATRIAL RATE: 57 BPM
EKG DIAGNOSIS: NORMAL
EKG P AXIS: 51 DEGREES
EKG P-R INTERVAL: 164 MS
EKG Q-T INTERVAL: 402 MS
EKG QRS DURATION: 84 MS
EKG QTC CALCULATION (BAZETT): 391 MS
EKG R AXIS: 60 DEGREES
EKG T AXIS: 41 DEGREES
EKG VENTRICULAR RATE: 57 BPM
EOSINOPHILS ABSOLUTE: 0.1 K/CU MM
EOSINOPHILS RELATIVE PERCENT: 1.4 % (ref 0–3)
GFR AFRICAN AMERICAN: 33 ML/MIN/1.73M2
GFR NON-AFRICAN AMERICAN: 27 ML/MIN/1.73M2
GLUCOSE BLD-MCNC: 101 MG/DL (ref 70–99)
GLUCOSE BLD-MCNC: 105 MG/DL
GLUCOSE BLD-MCNC: 105 MG/DL (ref 70–99)
GLUCOSE, URINE: NEGATIVE MG/DL
HCT VFR BLD CALC: 29 % (ref 42–52)
HEMOGLOBIN: 9.2 GM/DL (ref 13.5–18)
IMMATURE NEUTROPHIL %: 0.2 % (ref 0–0.43)
KETONES, URINE: NEGATIVE MG/DL
LEUKOCYTE ESTERASE, URINE: NEGATIVE
LYMPHOCYTES ABSOLUTE: 2 K/CU MM
LYMPHOCYTES RELATIVE PERCENT: 34.3 % (ref 24–44)
MAGNESIUM: 1.8 MG/DL (ref 1.8–2.4)
MCH RBC QN AUTO: 28.1 PG (ref 27–31)
MCHC RBC AUTO-ENTMCNC: 31.7 % (ref 32–36)
MCV RBC AUTO: 88.7 FL (ref 78–100)
MONOCYTES ABSOLUTE: 0.7 K/CU MM
MONOCYTES RELATIVE PERCENT: 12.1 % (ref 0–4)
NITRITE URINE, QUANTITATIVE: NEGATIVE
NUCLEATED RBC %: 0 %
PDW BLD-RTO: 14.4 % (ref 11.7–14.9)
PH, URINE: 6.5 (ref 5–8)
PLATELET # BLD: 123 K/CU MM (ref 140–440)
PMV BLD AUTO: 10 FL (ref 7.5–11.1)
POTASSIUM SERPL-SCNC: 4.3 MMOL/L (ref 3.5–5.1)
PRO-BNP: 891.2 PG/ML
PROTEIN UA: NEGATIVE MG/DL
RBC # BLD: 3.27 M/CU MM (ref 4.6–6.2)
RBC URINE: 1 /HPF (ref 0–3)
SEGMENTED NEUTROPHILS ABSOLUTE COUNT: 3 K/CU MM
SEGMENTED NEUTROPHILS RELATIVE PERCENT: 51.5 % (ref 36–66)
SODIUM BLD-SCNC: 137 MMOL/L (ref 135–145)
SPECIFIC GRAVITY UA: 1.01 (ref 1–1.03)
TOTAL IMMATURE NEUTOROPHIL: 0.01 K/CU MM
TOTAL NUCLEATED RBC: 0 K/CU MM
TOTAL PROTEIN: 7.6 GM/DL (ref 6.4–8.2)
TRICHOMONAS: ABNORMAL /HPF
TROPONIN T: <0.01 NG/ML
UROBILINOGEN, URINE: 0.2 MG/DL (ref 0.2–1)
WBC # BLD: 5.8 K/CU MM (ref 4–10.5)
WBC UA: ABNORMAL /HPF (ref 0–2)

## 2022-08-13 PROCEDURE — 96361 HYDRATE IV INFUSION ADD-ON: CPT

## 2022-08-13 PROCEDURE — 85025 COMPLETE CBC W/AUTO DIFF WBC: CPT

## 2022-08-13 PROCEDURE — 93010 ELECTROCARDIOGRAM REPORT: CPT | Performed by: INTERNAL MEDICINE

## 2022-08-13 PROCEDURE — 71045 X-RAY EXAM CHEST 1 VIEW: CPT

## 2022-08-13 PROCEDURE — 84484 ASSAY OF TROPONIN QUANT: CPT

## 2022-08-13 PROCEDURE — 83735 ASSAY OF MAGNESIUM: CPT

## 2022-08-13 PROCEDURE — 83880 ASSAY OF NATRIURETIC PEPTIDE: CPT

## 2022-08-13 PROCEDURE — 93005 ELECTROCARDIOGRAM TRACING: CPT | Performed by: PHYSICIAN ASSISTANT

## 2022-08-13 PROCEDURE — 74176 CT ABD & PELVIS W/O CONTRAST: CPT

## 2022-08-13 PROCEDURE — 80053 COMPREHEN METABOLIC PANEL: CPT

## 2022-08-13 PROCEDURE — 82962 GLUCOSE BLOOD TEST: CPT

## 2022-08-13 PROCEDURE — 81001 URINALYSIS AUTO W/SCOPE: CPT

## 2022-08-13 PROCEDURE — 99285 EMERGENCY DEPT VISIT HI MDM: CPT

## 2022-08-13 PROCEDURE — 2580000003 HC RX 258: Performed by: EMERGENCY MEDICINE

## 2022-08-13 PROCEDURE — 96360 HYDRATION IV INFUSION INIT: CPT

## 2022-08-13 RX ORDER — POLYETHYLENE GLYCOL 3350 17 G/17G
17 POWDER, FOR SOLUTION ORAL DAILY PRN
Qty: 5 EACH | Refills: 0 | Status: ON HOLD | OUTPATIENT
Start: 2022-08-13 | End: 2022-08-24

## 2022-08-13 RX ORDER — 0.9 % SODIUM CHLORIDE 0.9 %
500 INTRAVENOUS SOLUTION INTRAVENOUS ONCE
Status: COMPLETED | OUTPATIENT
Start: 2022-08-13 | End: 2022-08-13

## 2022-08-13 RX ADMIN — SODIUM CHLORIDE 500 ML: 9 INJECTION, SOLUTION INTRAVENOUS at 18:28

## 2022-08-13 ASSESSMENT — PAIN DESCRIPTION - ORIENTATION: ORIENTATION: LEFT

## 2022-08-13 ASSESSMENT — PAIN DESCRIPTION - LOCATION: LOCATION: JAW

## 2022-08-13 ASSESSMENT — PAIN DESCRIPTION - DESCRIPTORS: DESCRIPTORS: NUMBNESS

## 2022-08-13 ASSESSMENT — PAIN SCALES - GENERAL: PAINLEVEL_OUTOF10: 5

## 2022-08-13 ASSESSMENT — PAIN DESCRIPTION - FREQUENCY: FREQUENCY: INTERMITTENT

## 2022-08-13 NOTE — ED NOTES
Report given to [facility], [address] [facility]. Chart reviewed and all questions answered.       Harish Henao RN  08/13/22 1922

## 2022-08-13 NOTE — ED NOTES
Patient presents with generalized complaints. He states he has been losing weight the past year (about 30 pounds), fatigue, abdominal pain, and feels like he has fevers at night. He states he was here about a week ago for left jaw pain and then went to see a dentist who says he might need a root canal. Patient is currently taking antibiotics for this infection. Patient denies cough, sore throat, and shortness of breath. Patient resting in bed and denies needs at this time.       Hao Garrett RN  08/13/22 0815

## 2022-08-13 NOTE — ACP (ADVANCE CARE PLANNING)
Patient does not have any ACP documents/Medical Power of . LSW notes hospital will follow [place]'s Next of Kin hierarchy in the following descending order for priority:    Guardian  Spouse  [de-identified] of adult Children  Parents  [de-identified] of adult Siblings  Nearest Relative not described above    Per [place]'s Next of Kin hierarchy: Patients' child will be [address]. [Time Spent: ___ minutes] : I have spent [unfilled] minutes of time on the encounter.

## 2022-08-14 NOTE — ED PROVIDER NOTES
Emergency Department Encounter  Location: [address] [place] [address] EMERGENCY DEPARTMENT    Patient: Judd Schmidt  MRN: [id number]  : 1935  Date of evaluation: 2022  ED Provider: Sergio Ohara DO    Chief Complaint:    Illness (Fatigue, weakness )    Sycuan:  Judd Schmidt is a 81 y.o. male that presents to the emergency department with primary concern for constipation for the past 3 days. Did try Dulcolax without relief. He has been able to eat and drink without vomiting. No urinary symptoms. Reports he has been waking up with night sweats for the past few days, but denies measured fevers. On clindamycin since  for dental infection. Indicates this pain is much improved. ROS:  At least 10 systems reviewed and are acutely negative unless otherwise noted in the HPI. Past Medical History:   Diagnosis Date    Arthritis     CAD (coronary artery disease)     GERD (gastroesophageal reflux disease)     Hyperlipidemia     Hypertension     Kidney cysts     Right kidney    Thyroid disease     Unspecified cerebral artery occlusion with cerebral infarction      Past Surgical History:   Procedure Laterality Date    CATARACT REMOVAL      COLONOSCOPY  13    divertics    COLONOSCOPY N/A 2020    COLONOSCOPY DIAGNOSTIC performed by Minerva Delarosa MD at [address] [place] [address], COLON, DIAGNOSTIC  13    egd: normal    FRACTURE SURGERY      PROSTATE BIOPSY      UPPER GASTROINTESTINAL ENDOSCOPY N/A 2020    EGD DIAGNOSTIC ONLY performed by Minerva Delarosa MD at [address] [facility] ENDOSCOPY     Family History   Problem Relation Age of Onset    Heart Failure Mother     Kidney Disease Mother     Bleeding Prob Mother     Alzheimer's Disease Father     Depression Sister     Osteoarthritis Sister     High Blood Pressure Daughter      Social History     Socioeconomic History    Marital status:       Spouse name: Not on file    Number of children: Not on file    Years of education: Not on file    Highest education level: Not on file   Occupational History    Not on file   Tobacco Use    Smoking status: Never    Smokeless tobacco: Never   Substance and Sexual Activity    Alcohol use: No    Drug use: No    Sexual activity: Not on file   Other Topics Concern    Not on file   Social History Narrative    Not on file     Social Determinants of Health     Financial Resource Strain: Not on file   Food Insecurity: Not on file   Transportation Needs: Not on file   Physical Activity: Not on file   Stress: Not on file   Social Connections: Not on file   Intimate Partner Violence: Not on file   Housing Stability: Not on file     No current facility-administered medications for this encounter. No current outpatient medications on file.      Facility-Administered Medications Ordered in Other Encounters   Medication Dose Route Frequency Provider Last Rate Last Admin    sennosides-docusate sodium (SENOKOT-S) 8.6-50 MG tablet 2 tablet  2 tablet Oral Daily Rachel Guajardo DO   2 tablet at 08/19/22 0948    tamsulosin (FLOMAX) capsule 0.4 mg  0.4 mg Oral Daily Homer Araiza MD   0.4 mg at 08/19/22 0948    [Held by provider] levothyroxine (SYNTHROID) tablet 75 mcg  75 mcg Oral Daily Homer Araiza MD   75 mcg at 08/17/22 0055    atorvastatin (LIPITOR) tablet 10 mg  10 mg Oral Daily Homer Araiza MD   10 mg at 08/19/22 0948    sodium chloride flush 0.9 % injection 5-40 mL  5-40 mL IntraVENous 2 times per day Homer Araiza MD   10 mL at 08/19/22 0949    sodium chloride flush 0.9 % injection 5-40 mL  5-40 mL IntraVENous PRN Homer Araiza MD        0.9 % sodium chloride infusion   IntraVENous PRN Homer Araiza MD        ondansetron (ZOFRAN-ODT) disintegrating tablet 4 mg  4 mg Oral Q8H PRN Homer Araiza MD        Or    ondansetron (ZOFRAN) injection 4 mg  4 mg IntraVENous Q6H PRN Homer Araiza MD        acetaminophen (TYLENOL) tablet 650 mg  650 mg Oral Q6H PRN Homer Araiza MD   650 mg at 08/19/22 0955    Or    acetaminophen (TYLENOL) suppository 650 mg  650 mg Rectal Q6H PRN Homer Araiza MD        sennosides (SENOKOT) 8.8 MG/5ML syrup 5 mL  5 mL Oral BID PRN Homer Araiza MD        pantoprazole (PROTONIX) tablet 40 mg  40 mg Oral QAM AC Homer Araiza MD   40 mg at 08/19/22 0534    polyethylene glycol (GLYCOLAX) packet 17 g  17 g Oral Daily Emma Hammonds MD   17 g at 08/19/22 0948    sertraline (ZOLOFT) tablet 100 mg  100 mg Oral Daily EVERTON Domingo CNP   100 mg at 08/19/22 0948    QUEtiapine (SEROQUEL) tablet 12.5 mg  12.5 mg Oral Nightly EVERTON Domingo CNP   12.5 mg at 08/18/22 2103    hydrOXYzine pamoate (VISTARIL) capsule 25 mg  25 mg Oral Daily PRN EVERTON Domingo CNP   25 mg at 08/19/22 0325    heparin (porcine) injection 5,000 Units  5,000 Units SubCUTAneous 3 times per day Emma Hammonds MD   5,000 Units at 08/19/22 0534    vitamin B-12 (CYANOCOBALAMIN) tablet 1,000 mcg  1,000 mcg Oral Daily Emma Hammonds MD   1,000 mcg at 08/19/22 0948    ezetimibe (ZETIA) tablet 10 mg  10 mg Oral Daily Emma Hammonds MD   10 mg at 08/19/22 0948    metoprolol succinate (TOPROL XL) extended release tablet 100 mg  100 mg Oral Daily Emma Hammonds MD   100 mg at 08/19/22 0948    hydrALAZINE (APRESOLINE) injection 10 mg  10 mg IntraVENous Q6H PRN Cesar Soni MD   10 mg at 08/18/22 0438     Allergies   Allergen Reactions    Erythromycin     Pcn [Penicillins]        Nursing Notes Reviewed    Physical Exam:  ED Triage Vitals [08/13/22 1511]   Enc Vitals Group      BP (!) 145/70      Heart Rate 62      Resp 16      Temp 98.5 °F (36.9 °C)      Temp Source Oral      SpO2 99 %      Weight 150 lb (68 kg)      Height 5' 11\" (1.803 m)      Head Circumference       Peak Flow       Pain Score       Pain Loc       Pain Edu? Excl. in [address]? GENERAL APPEARANCE: Awake and alert. Cooperative. No acute distress. Comfortable elderly male.   HEAD: Normocephalic. Atraumatic. EYES: EOM's grossly intact. Sclera anicteric. ENT: Tolerates saliva. No trismus. NECK: Supple. Trachea midline. CARDIO: RRR. Radial pulse 2+. LUNGS: Respirations unlabored. CTAB. ABDOMEN: Soft. Non-distended. Mildly generally tender. No rebound or guarding. No CVA tenderness. EXTREMITIES: No acute deformities. No LE tenderness/edema/asymmetry. Symmetric LE pulses. SKIN: Warm and dry. NEUROLOGICAL: No gross facial drooping. Moves all 4 extremities spontaneously. PSYCHIATRIC: Normal mood.      Labs:  Results for orders placed or performed during the hospital encounter of 08/13/22   CBC with Auto Differential   Result Value Ref Range    WBC 5.8 4.0 - 10.5 K/CU MM    RBC 3.27 (L) 4.6 - 6.2 M/CU MM    Hemoglobin 9.2 (L) 13.5 - 18.0 GM/DL    Hematocrit 29.0 (L) 42 - 52 %    MCV 88.7 78 - 100 FL    MCH 28.1 27 - 31 PG    MCHC 31.7 (L) 32.0 - 36.0 %    RDW 14.4 11.7 - 14.9 %    Platelets 456 (L) 468 - 440 K/CU MM    MPV 10.0 7.5 - 11.1 FL    Differential Type AUTOMATED DIFFERENTIAL     Segs Relative 51.5 36 - 66 %    Lymphocytes % 34.3 24 - 44 %    Monocytes % 12.1 (H) 0 - 4 %    Eosinophils % 1.4 0 - 3 %    Basophils % 0.5 0 - 1 %    Segs Absolute 3.0 K/CU MM    Lymphocytes Absolute 2.0 K/CU MM    Monocytes Absolute 0.7 K/CU MM    Eosinophils Absolute 0.1 K/CU MM    Basophils Absolute 0.0 K/CU MM    Nucleated RBC % 0.0 %    Total Nucleated RBC 0.0 K/CU MM    Total Immature Neutrophil 0.01 K/CU MM    Immature Neutrophil % 0.2 0 - 0.43 %   Comprehensive Metabolic Panel   Result Value Ref Range    Sodium 137 135 - 145 MMOL/L    Potassium 4.3 3.5 - 5.1 MMOL/L    Chloride 103 99 - 110 mMol/L    CO2 23 21 - 32 MMOL/L    BUN 34 (H) 6 - 23 MG/DL    Creatinine 2.3 (H) 0.9 - 1.3 MG/DL    Glucose 101 (H) 70 - 99 MG/DL    Calcium 9.4 8.3 - 10.6 MG/DL    Albumin 4.0 3.4 - 5.0 GM/DL    Total Protein 7.6 6.4 - 8.2 GM/DL    Total Bilirubin 0.3 0.0 - 1.0 MG/DL    ALT 8 (L) 10 - 40 U/L    AST 15 15 - 37 IU/L    Alkaline Phosphatase 74 40 - 129 IU/L    GFR Non- 27 (L) >60 mL/min/1.73m2    GFR  33 (L) >60 mL/min/1.73m2    Anion Gap 11 4 - 16   Troponin   Result Value Ref Range    Troponin T <0.010 <0.01 NG/ML   Brain Natriuretic Peptide   Result Value Ref Range    Pro-.2 (H) <300 PG/ML   Urinalysis   Result Value Ref Range    Color, UA YELLOW YELLOW    Clarity, UA CLEAR CLEAR    Glucose, Urine NEGATIVE NEGATIVE MG/DL    Bilirubin Urine NEGATIVE NEGATIVE MG/DL    Ketones, Urine NEGATIVE NEGATIVE MG/DL    Specific Gravity, UA 1.010 1.001 - 1.035    Blood, Urine TRACE (A) NEGATIVE    pH, Urine 6.5 5.0 - 8.0    Protein, UA NEGATIVE NEGATIVE MG/DL    Urobilinogen, Urine 0.2 0.2 - 1.0 MG/DL    Nitrite Urine, Quantitative NEGATIVE NEGATIVE    Leukocyte Esterase, Urine NEGATIVE NEGATIVE    RBC, UA 1 0 - 3 /HPF    WBC, UA NONE SEEN 0 - 2 /HPF    Bacteria, UA NEGATIVE NEGATIVE /HPF    Trichomonas, UA NONE SEEN NONE SEEN /HPF   Magnesium   Result Value Ref Range    Magnesium 1.8 1.8 - 2.4 mg/dl   POC Blood Glucose   Result Value Ref Range    Glucose 105 mg/dL   POCT Glucose   Result Value Ref Range    POC Glucose 105 (H) 70 - 99 MG/DL   EKG 12 Lead   Result Value Ref Range    Ventricular Rate 57 BPM    Atrial Rate 57 BPM    P-R Interval 164 ms    QRS Duration 84 ms    Q-T Interval 402 ms    QTc Calculation (Bazett) 391 ms    P Axis 51 degrees    R Axis 60 degrees    T Axis 41 degrees    Diagnosis       Sinus bradycardia  Otherwise normal ECG  When compared with ECG of 03-FEB-2020 18:48,  No significant change was found  Confirmed by SHANIQUA Babb ([id number]) on 8/13/2022 3:59:22 PM         EKG (if obtained): (All EKG's are interpreted by myself in the absence of a cardiologist)  Sinus bradycardia @ 57. Normal axis with good R wave progression. No ST elevation or depression. No ectopy. No acute change from prior tracing.     Radiographs (if obtained):  [] The following radiograph was interpreted by myself in the absence of a radiologist:  [x] Radiologist's Report reviewed at time of ED visit:  CT ABDOMEN PELVIS WO CONTRAST Additional Contrast? None    Result Date: 8/13/2022  EXAMINATION: CT OF THE ABDOMEN AND PELVIS WITHOUT CONTRAST 8/13/2022 6:30 pm TECHNIQUE: CT of the abdomen and pelvis was performed without the administration of intravenous contrast. Multiplanar reformatted images are provided for review. Automated exposure control, iterative reconstruction, and/or weight based adjustment of the mA/kV was utilized to reduce the radiation dose to as low as reasonably achievable. COMPARISON: CT abdomen pelvis November 30, 2021; CT abdomen pelvis November 14, 2019; CT abdomen pelvis June 8, 2011. HISTORY: ORDERING SYSTEM PROVIDED HISTORY: abd pain TECHNOLOGIST PROVIDED HISTORY: Reason for exam:->abd pain Additional Contrast?->None Decision Support Exception - unselect if not a suspected or confirmed emergency medical condition->Emergency Medical Condition (MA) Reason for Exam: low abdomen pain FINDINGS: Lower Chest: Dependent atelectasis at the lung bases. Organs: Evaluation of the solid organs is limited due to lack of intravenous contrast.  The nonenhanced liver appears grossly unremarkable. The gallbladder is collapsed not well evaluated. No surrounding stranding or inflammatory change identified at the gallbladder fossa. The nonenhanced spleen, pancreas, adrenal glands, and kidneys demonstrate no acute abnormality. Similar appearance of partially calcified cystic lesion of the superior pole of the right kidney. This is grossly unchanged in size compared with numerous previous exams dating back to 2011. Simple exophytic cyst of the left kidney. No additional routine follow-up per current guidelines given long-term stability and overall simple appearance of the cyst.  Punctate nonobstructing bilateral renal stones. No hydronephrosis or obstructive uropathy identified.  GI/Bowel: No bowel obstruction identified. Colonic diverticulosis without CT evidence of diverticulitis. Normal appendix. Small bowel is normal in caliber. Pelvis: The bladder in solid pelvic organs demonstrate no acute findings. Penile implant partially imaged. Peritoneum/Retroperitoneum: The abdominal aorta is stable in appearance with severe calcified atherosclerotic plaque throughout. No retroperitoneal adenopathy. No free fluid or free air identified. Bones/Soft Tissues: No acute osseous or soft tissue abnormality identified. Postoperative changes of vertebroplasty noted at the L3, L4, and L5 levels. Chronic compression deformity of the T12 vertebral body. 1. No acute intraabdominal process identified. 2. Colonic diverticulosis without CT evidence of diverticulitis. 3. Punctate nonobstructing bilateral renal stones. No obstructive uropathy identified. 4. No bowel obstruction. 5. Severe atherosclerotic disease. XR CHEST PORTABLE    Result Date: 8/13/2022  EXAMINATION: ONE XRAY VIEW OF THE CHEST 8/13/2022 3:51 pm COMPARISON: Chest radiograph 2020. HISTORY: ORDERING SYSTEM PROVIDED HISTORY: chest pain TECHNOLOGIST PROVIDED HISTORY: Reason for exam:->chest pain Reason for Exam: pt stated hasn't felt well in 2 weeks, has a tooth infection, took antibiotics, still having pain, and fever FINDINGS: Single view provided. The mediastinal, cardiac, and hilar silhouettes are normal.  Normal lung volumes. No diffuse interstitial edema or acute consolidation. No pulmonary mass. No pneumothorax or free subdiaphragmatic air. The visualized bowel gas pattern is normal.     No acute abnormality. ED Course and MDM:  Given patient's advanced age and mild tenderness, labs and imaging are obtained. Chemistries are consistent with his known CKD without significant electrolyte derangement. No significant leukocytosis. EKG and troponin are reassuring. Imaging is reassuring with no acute pathology per radiology.     I think patient is appropriate for outpatient management. He has been gently hydrated here and is encouraged to continue good hydration at home. Is given RX for miralax. Patient is given instructions regarding symptomatic care at home as well as return precautions. To call PCP for follow up in 2-3 days. Patient verbalizes understanding of all instructions and is comfortable with the plan of care. Final Impression:  1. Constipation, unspecified constipation type      DISPOSITION Decision To Discharge 08/13/2022 08:07:29 PM      Patient referred to:   Mahendra Sims MD  [address]  [phone]    Schedule an appointment as soon as possible for a visit in 2 days      [facility] Emergency Department  [facility] [phone]    If symptoms worsen    Discharge medications:  Discharge Medication List as of 8/13/2022  8:13 PM        START taking these medications    Details   polyethylene glycol (MIRALAX) 17 g packet Take 17 g by mouth daily as needed for Constipation, Disp-5 each, R-0Normal           (Please note that portions of this note may have been completed with a voice recognition program. Efforts were made to edit the dictations but occasionally words are mis-transcribed.)    DO Alessia Mcpherson DO  08/19/22 1128

## 2022-08-16 ENCOUNTER — HOSPITAL ENCOUNTER (INPATIENT)
Age: 87
LOS: 8 days | Discharge: NURSING, REHAB OR LONG-TERM CARE | DRG: 841 | End: 2022-08-24
Attending: INTERNAL MEDICINE
Payer: MEDICARE

## 2022-08-16 PROBLEM — D64.9 ACUTE ANEMIA: Status: ACTIVE | Noted: 2022-08-16

## 2022-08-16 PROCEDURE — 1200000000 HC SEMI PRIVATE

## 2022-08-16 ASSESSMENT — PAIN DESCRIPTION - LOCATION: LOCATION: ABDOMEN

## 2022-08-16 ASSESSMENT — PAIN DESCRIPTION - ORIENTATION: ORIENTATION: LEFT;UPPER

## 2022-08-16 ASSESSMENT — PAIN SCALES - GENERAL
PAINLEVEL_OUTOF10: 5
PAINLEVEL_OUTOF10: 5

## 2022-08-16 ASSESSMENT — PAIN DESCRIPTION - DESCRIPTORS: DESCRIPTORS: ACHING

## 2022-08-16 ASSESSMENT — PAIN DESCRIPTION - FREQUENCY: FREQUENCY: INTERMITTENT

## 2022-08-17 ENCOUNTER — APPOINTMENT (OUTPATIENT)
Dept: ULTRASOUND IMAGING | Age: 87
DRG: 841 | End: 2022-08-17
Attending: INTERNAL MEDICINE
Payer: MEDICARE

## 2022-08-17 PROBLEM — R53.1 GENERALIZED WEAKNESS: Status: ACTIVE | Noted: 2022-08-17

## 2022-08-17 PROBLEM — F33.1 MDD (MAJOR DEPRESSIVE DISORDER), RECURRENT EPISODE, MODERATE (HCC): Status: ACTIVE | Noted: 2022-08-17

## 2022-08-17 PROBLEM — R00.2 HEART PALPITATIONS: Status: ACTIVE | Noted: 2022-08-17

## 2022-08-17 PROBLEM — F41.1 GAD (GENERALIZED ANXIETY DISORDER): Status: ACTIVE | Noted: 2022-08-17

## 2022-08-17 LAB
AMMONIA: 22 UMOL/L (ref 16–60)
ANION GAP SERPL CALCULATED.3IONS-SCNC: 13 MMOL/L (ref 4–16)
BUN BLDV-MCNC: 31 MG/DL (ref 6–23)
CALCIUM SERPL-MCNC: 9.3 MG/DL (ref 8.3–10.6)
CHLORIDE BLD-SCNC: 106 MMOL/L (ref 99–110)
CO2: 20 MMOL/L (ref 21–32)
CREAT SERPL-MCNC: 2.4 MG/DL (ref 0.9–1.3)
EKG ATRIAL RATE: 58 BPM
EKG DIAGNOSIS: NORMAL
EKG P AXIS: 26 DEGREES
EKG P-R INTERVAL: 160 MS
EKG Q-T INTERVAL: 434 MS
EKG QRS DURATION: 74 MS
EKG QTC CALCULATION (BAZETT): 426 MS
EKG R AXIS: 34 DEGREES
EKG T AXIS: 39 DEGREES
EKG VENTRICULAR RATE: 58 BPM
GFR AFRICAN AMERICAN: 31 ML/MIN/1.73M2
GFR NON-AFRICAN AMERICAN: 26 ML/MIN/1.73M2
GLUCOSE BLD-MCNC: 92 MG/DL (ref 70–99)
HCT VFR BLD CALC: 29.3 % (ref 42–52)
HEMOGLOBIN: 9.6 GM/DL (ref 13.5–18)
LV EF: 53 %
LVEF MODALITY: NORMAL
MAGNESIUM: 1.9 MG/DL (ref 1.8–2.4)
MCH RBC QN AUTO: 28.5 PG (ref 27–31)
MCHC RBC AUTO-ENTMCNC: 32.8 % (ref 32–36)
MCV RBC AUTO: 86.9 FL (ref 78–100)
PDW BLD-RTO: 14.4 % (ref 11.7–14.9)
PHOSPHORUS: 3.2 MG/DL (ref 2.5–4.9)
PLATELET # BLD: 122 K/CU MM (ref 140–440)
PMV BLD AUTO: 9 FL (ref 7.5–11.1)
POTASSIUM SERPL-SCNC: 4.3 MMOL/L (ref 3.5–5.1)
RBC # BLD: 3.37 M/CU MM (ref 4.6–6.2)
SODIUM BLD-SCNC: 139 MMOL/L (ref 135–145)
T3 FREE: 2.1 PG/ML (ref 2.3–4.2)
T4 FREE: 1.25 NG/DL (ref 0.9–1.8)
TSH HIGH SENSITIVITY: 0.01 UIU/ML (ref 0.27–4.2)
WBC # BLD: 5.9 K/CU MM (ref 4–10.5)

## 2022-08-17 PROCEDURE — 76775 US EXAM ABDO BACK WALL LIM: CPT

## 2022-08-17 PROCEDURE — 97530 THERAPEUTIC ACTIVITIES: CPT

## 2022-08-17 PROCEDURE — 93306 TTE W/DOPPLER COMPLETE: CPT

## 2022-08-17 PROCEDURE — 84443 ASSAY THYROID STIM HORMONE: CPT

## 2022-08-17 PROCEDURE — 84439 ASSAY OF FREE THYROXINE: CPT

## 2022-08-17 PROCEDURE — 83735 ASSAY OF MAGNESIUM: CPT

## 2022-08-17 PROCEDURE — 1200000000 HC SEMI PRIVATE

## 2022-08-17 PROCEDURE — 85027 COMPLETE CBC AUTOMATED: CPT

## 2022-08-17 PROCEDURE — 97116 GAIT TRAINING THERAPY: CPT

## 2022-08-17 PROCEDURE — 6370000000 HC RX 637 (ALT 250 FOR IP): Performed by: NURSE PRACTITIONER

## 2022-08-17 PROCEDURE — 6370000000 HC RX 637 (ALT 250 FOR IP): Performed by: STUDENT IN AN ORGANIZED HEALTH CARE EDUCATION/TRAINING PROGRAM

## 2022-08-17 PROCEDURE — 6360000002 HC RX W HCPCS: Performed by: STUDENT IN AN ORGANIZED HEALTH CARE EDUCATION/TRAINING PROGRAM

## 2022-08-17 PROCEDURE — 93010 ELECTROCARDIOGRAM REPORT: CPT | Performed by: INTERNAL MEDICINE

## 2022-08-17 PROCEDURE — 6370000000 HC RX 637 (ALT 250 FOR IP): Performed by: INTERNAL MEDICINE

## 2022-08-17 PROCEDURE — 99223 1ST HOSP IP/OBS HIGH 75: CPT | Performed by: INTERNAL MEDICINE

## 2022-08-17 PROCEDURE — 97162 PT EVAL MOD COMPLEX 30 MIN: CPT

## 2022-08-17 PROCEDURE — 94761 N-INVAS EAR/PLS OXIMETRY MLT: CPT

## 2022-08-17 PROCEDURE — 36415 COLL VENOUS BLD VENIPUNCTURE: CPT

## 2022-08-17 PROCEDURE — 90792 PSYCH DIAG EVAL W/MED SRVCS: CPT | Performed by: NURSE PRACTITIONER

## 2022-08-17 PROCEDURE — 80048 BASIC METABOLIC PNL TOTAL CA: CPT

## 2022-08-17 PROCEDURE — 51798 US URINE CAPACITY MEASURE: CPT

## 2022-08-17 PROCEDURE — 93005 ELECTROCARDIOGRAM TRACING: CPT | Performed by: STUDENT IN AN ORGANIZED HEALTH CARE EDUCATION/TRAINING PROGRAM

## 2022-08-17 PROCEDURE — 97166 OT EVAL MOD COMPLEX 45 MIN: CPT

## 2022-08-17 PROCEDURE — 84100 ASSAY OF PHOSPHORUS: CPT

## 2022-08-17 PROCEDURE — 2580000003 HC RX 258: Performed by: STUDENT IN AN ORGANIZED HEALTH CARE EDUCATION/TRAINING PROGRAM

## 2022-08-17 PROCEDURE — 84481 FREE ASSAY (FT-3): CPT

## 2022-08-17 PROCEDURE — 82140 ASSAY OF AMMONIA: CPT

## 2022-08-17 RX ORDER — POLYETHYLENE GLYCOL 3350 17 G/17G
17 POWDER, FOR SOLUTION ORAL DAILY PRN
Status: DISCONTINUED | OUTPATIENT
Start: 2022-08-17 | End: 2022-08-17

## 2022-08-17 RX ORDER — QUETIAPINE FUMARATE 25 MG/1
12.5 TABLET, FILM COATED ORAL NIGHTLY
Status: DISCONTINUED | OUTPATIENT
Start: 2022-08-17 | End: 2022-08-24 | Stop reason: HOSPADM

## 2022-08-17 RX ORDER — ESOMEPRAZOLE MAGNESIUM 40 MG/1
40 FOR SUSPENSION ORAL DAILY
Status: DISCONTINUED | OUTPATIENT
Start: 2022-08-17 | End: 2022-08-17 | Stop reason: CLARIF

## 2022-08-17 RX ORDER — ATORVASTATIN CALCIUM 10 MG/1
10 TABLET, FILM COATED ORAL DAILY
Status: DISCONTINUED | OUTPATIENT
Start: 2022-08-17 | End: 2022-08-24 | Stop reason: HOSPADM

## 2022-08-17 RX ORDER — HYDROXYZINE PAMOATE 25 MG/1
25 CAPSULE ORAL NIGHTLY PRN
Status: DISCONTINUED | OUTPATIENT
Start: 2022-08-17 | End: 2022-08-17

## 2022-08-17 RX ORDER — ONDANSETRON 4 MG/1
4 TABLET, ORALLY DISINTEGRATING ORAL EVERY 8 HOURS PRN
Status: DISCONTINUED | OUTPATIENT
Start: 2022-08-17 | End: 2022-08-24 | Stop reason: HOSPADM

## 2022-08-17 RX ORDER — TRAZODONE HYDROCHLORIDE 50 MG/1
50 TABLET ORAL NIGHTLY
Status: DISCONTINUED | OUTPATIENT
Start: 2022-08-17 | End: 2022-08-17

## 2022-08-17 RX ORDER — ACETAMINOPHEN 650 MG/1
650 SUPPOSITORY RECTAL EVERY 6 HOURS PRN
Status: DISCONTINUED | OUTPATIENT
Start: 2022-08-17 | End: 2022-08-24 | Stop reason: HOSPADM

## 2022-08-17 RX ORDER — POLYETHYLENE GLYCOL 3350 17 G/17G
17 POWDER, FOR SOLUTION ORAL DAILY
Status: DISCONTINUED | OUTPATIENT
Start: 2022-08-17 | End: 2022-08-24 | Stop reason: HOSPADM

## 2022-08-17 RX ORDER — LEVOTHYROXINE SODIUM 0.07 MG/1
75 TABLET ORAL DAILY
Status: DISCONTINUED | OUTPATIENT
Start: 2022-08-17 | End: 2022-08-24 | Stop reason: HOSPADM

## 2022-08-17 RX ORDER — SODIUM CHLORIDE 0.9 % (FLUSH) 0.9 %
5-40 SYRINGE (ML) INJECTION EVERY 12 HOURS SCHEDULED
Status: DISCONTINUED | OUTPATIENT
Start: 2022-08-17 | End: 2022-08-24 | Stop reason: HOSPADM

## 2022-08-17 RX ORDER — HYDROXYZINE PAMOATE 25 MG/1
25 CAPSULE ORAL 2 TIMES DAILY PRN
Status: DISCONTINUED | OUTPATIENT
Start: 2022-08-17 | End: 2022-08-17

## 2022-08-17 RX ORDER — SODIUM CHLORIDE, SODIUM LACTATE, POTASSIUM CHLORIDE, CALCIUM CHLORIDE 600; 310; 30; 20 MG/100ML; MG/100ML; MG/100ML; MG/100ML
INJECTION, SOLUTION INTRAVENOUS CONTINUOUS
Status: ACTIVE | OUTPATIENT
Start: 2022-08-17 | End: 2022-08-17

## 2022-08-17 RX ORDER — QUETIAPINE FUMARATE 25 MG/1
25 TABLET, FILM COATED ORAL NIGHTLY
Status: DISCONTINUED | OUTPATIENT
Start: 2022-08-17 | End: 2022-08-17

## 2022-08-17 RX ORDER — LANOLIN ALCOHOL/MO/W.PET/CERES
1000 CREAM (GRAM) TOPICAL DAILY
Status: DISCONTINUED | OUTPATIENT
Start: 2022-08-17 | End: 2022-08-24 | Stop reason: HOSPADM

## 2022-08-17 RX ORDER — HEPARIN SODIUM 5000 [USP'U]/ML
5000 INJECTION, SOLUTION INTRAVENOUS; SUBCUTANEOUS EVERY 8 HOURS SCHEDULED
Status: DISCONTINUED | OUTPATIENT
Start: 2022-08-18 | End: 2022-08-24 | Stop reason: HOSPADM

## 2022-08-17 RX ORDER — ENOXAPARIN SODIUM 100 MG/ML
30 INJECTION SUBCUTANEOUS DAILY
Status: DISCONTINUED | OUTPATIENT
Start: 2022-08-17 | End: 2022-08-17

## 2022-08-17 RX ORDER — ACETAMINOPHEN 325 MG/1
650 TABLET ORAL EVERY 6 HOURS PRN
Status: DISCONTINUED | OUTPATIENT
Start: 2022-08-17 | End: 2022-08-24 | Stop reason: HOSPADM

## 2022-08-17 RX ORDER — HYDRALAZINE HYDROCHLORIDE 20 MG/ML
10 INJECTION INTRAMUSCULAR; INTRAVENOUS EVERY 6 HOURS PRN
Status: DISCONTINUED | OUTPATIENT
Start: 2022-08-17 | End: 2022-08-17

## 2022-08-17 RX ORDER — EZETIMIBE 10 MG/1
10 TABLET ORAL DAILY
Status: DISCONTINUED | OUTPATIENT
Start: 2022-08-17 | End: 2022-08-24 | Stop reason: HOSPADM

## 2022-08-17 RX ORDER — HYDROXYZINE PAMOATE 25 MG/1
25 CAPSULE ORAL DAILY PRN
Status: DISCONTINUED | OUTPATIENT
Start: 2022-08-17 | End: 2022-08-23

## 2022-08-17 RX ORDER — TAMSULOSIN HYDROCHLORIDE 0.4 MG/1
0.4 CAPSULE ORAL DAILY
Status: DISCONTINUED | OUTPATIENT
Start: 2022-08-17 | End: 2022-08-24 | Stop reason: HOSPADM

## 2022-08-17 RX ORDER — ONDANSETRON 2 MG/ML
4 INJECTION INTRAMUSCULAR; INTRAVENOUS EVERY 6 HOURS PRN
Status: DISCONTINUED | OUTPATIENT
Start: 2022-08-17 | End: 2022-08-24 | Stop reason: HOSPADM

## 2022-08-17 RX ORDER — SODIUM CHLORIDE 9 MG/ML
INJECTION, SOLUTION INTRAVENOUS PRN
Status: DISCONTINUED | OUTPATIENT
Start: 2022-08-17 | End: 2022-08-24 | Stop reason: HOSPADM

## 2022-08-17 RX ORDER — HYDRALAZINE HYDROCHLORIDE 20 MG/ML
10 INJECTION INTRAMUSCULAR; INTRAVENOUS EVERY 6 HOURS PRN
Status: DISCONTINUED | OUTPATIENT
Start: 2022-08-17 | End: 2022-08-24 | Stop reason: HOSPADM

## 2022-08-17 RX ORDER — SODIUM CHLORIDE 0.9 % (FLUSH) 0.9 %
5-40 SYRINGE (ML) INJECTION PRN
Status: DISCONTINUED | OUTPATIENT
Start: 2022-08-17 | End: 2022-08-24 | Stop reason: HOSPADM

## 2022-08-17 RX ORDER — PANTOPRAZOLE SODIUM 40 MG/1
40 TABLET, DELAYED RELEASE ORAL
Status: DISCONTINUED | OUTPATIENT
Start: 2022-08-17 | End: 2022-08-24 | Stop reason: HOSPADM

## 2022-08-17 RX ORDER — METOPROLOL SUCCINATE 50 MG/1
100 TABLET, EXTENDED RELEASE ORAL DAILY
Status: DISCONTINUED | OUTPATIENT
Start: 2022-08-17 | End: 2022-08-24 | Stop reason: HOSPADM

## 2022-08-17 RX ADMIN — LEVOTHYROXINE SODIUM 75 MCG: 0.07 TABLET ORAL at 00:55

## 2022-08-17 RX ADMIN — SERTRALINE 50 MG: 50 TABLET, FILM COATED ORAL at 08:29

## 2022-08-17 RX ADMIN — EZETIMIBE 10 MG: 10 TABLET ORAL at 16:20

## 2022-08-17 RX ADMIN — METOPROLOL SUCCINATE 100 MG: 50 TABLET, EXTENDED RELEASE ORAL at 16:20

## 2022-08-17 RX ADMIN — PANTOPRAZOLE SODIUM 40 MG: 40 TABLET, DELAYED RELEASE ORAL at 06:02

## 2022-08-17 RX ADMIN — SODIUM CHLORIDE, POTASSIUM CHLORIDE, SODIUM LACTATE AND CALCIUM CHLORIDE: 600; 310; 30; 20 INJECTION, SOLUTION INTRAVENOUS at 00:55

## 2022-08-17 RX ADMIN — QUETIAPINE FUMARATE 12.5 MG: 25 TABLET ORAL at 21:48

## 2022-08-17 RX ADMIN — TAMSULOSIN HYDROCHLORIDE 0.4 MG: 0.4 CAPSULE ORAL at 08:29

## 2022-08-17 RX ADMIN — POLYETHYLENE GLYCOL (3350) 17 G: 17 POWDER, FOR SOLUTION ORAL at 11:28

## 2022-08-17 RX ADMIN — ENOXAPARIN SODIUM 30 MG: 100 INJECTION SUBCUTANEOUS at 08:29

## 2022-08-17 RX ADMIN — CYANOCOBALAMIN TAB 1000 MCG 1000 MCG: 1000 TAB at 16:20

## 2022-08-17 RX ADMIN — TRAZODONE HYDROCHLORIDE 50 MG: 50 TABLET ORAL at 00:55

## 2022-08-17 RX ADMIN — ATORVASTATIN CALCIUM 10 MG: 10 TABLET, FILM COATED ORAL at 08:29

## 2022-08-17 ASSESSMENT — ENCOUNTER SYMPTOMS
ABDOMINAL PAIN: 0
SINUS PRESSURE: 0
COUGH: 0
EYE PAIN: 0
NAUSEA: 0
CONSTIPATION: 1
EYE DISCHARGE: 0
BACK PAIN: 0
SHORTNESS OF BREATH: 0
VOMITING: 0
SINUS PAIN: 0
CHEST TIGHTNESS: 0
BLOOD IN STOOL: 0
DIARRHEA: 0
VOICE CHANGE: 0

## 2022-08-17 ASSESSMENT — PAIN SCALES - GENERAL
PAINLEVEL_OUTOF10: 0
PAINLEVEL_OUTOF10: 0

## 2022-08-17 NOTE — H&P
V2.0  History and Physical      Name:  Meek Cruz /Age/Sex: 1935  (81 y.o. male)   MRN & CSN:  [id number] & [id number] Encounter Date/Time: 2022 12:07 AM EDT   [facility] PCP: Rick Naranjo MD       Hospital Day: 2    Assessment and Plan:   Meek Cruz is a 81 y.o. male with a pmh of CAD, chronic GERD, hyperlipidemia, hypertension, history of kidney cyst, hypothyroidism, history of CVA who presents with Acute anemia    Hospital Problems             Last Modified POA    * (Principal) Acute anemia 2022 Yes    Generalized weakness 2022 Yes       Generalized weakness with chronic debility: Presented with constipation for the last 3 days. More confused than normal.  Not at baseline. Tried Dulcolax without relief. Still able to eat and drink, no vomiting or nausea. Denies any urinary symptoms. On clindamycin for dental infection since . Stool softeners orders in place. Ammonia level ordered. Has underlying hypothyroidism, will check T3-T4 and TSH. May need soapsuds enema and stool softeners do not work. CT scan of the abdominal pelvis showed no acute intra-abdominal processes. Advance diet as tolerated. We will do LR at 75 mL/h. Trazodone for sleep. History of pancytopenia: Extensive investigation done by hematology oncology at [facility] with a bone marrow biopsy done last year in . Benign essential hypertension: Home medications include Hyzaar 1 tablet daily. Hold off because of elevated creatinine levels  OLIVER on CKD stage 3b: Avoid nephrotoxic agents. Estimated baseline creatinine of 1.7. Creatinine of 2.3 today. 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 Kappa lambda ratio normal at 1.03.   Coronary artery disease that is post PTCA with stents in place, was on aspirin and Plavix before that has been discontinued because of frequent underlying history of GI bleeds. Also currently on Protonix. Elevated BNP: We will get echocardiogram and EKG. Repeat troponin in the morning. BPH on Flomax in the hospital, resume home meds on discharge  Hypothyroidism: On levothyroxine  Insomnia on trazodone  Depression on sertraline  Punctate nonobstructing bilateral renal stones  Colonic diverticulosis  Severe atherosclerotic disease involving aorta    Disposition:   Current Living situation: Home  Expected Disposition: Home  Estimated D/C: 3-4 days    Diet ADULT DIET; Regular   DVT Prophylaxis [x] Lovenox, []  Heparin, [] SCDs, [] Ambulation,  [] Eliquis, [] Xarelto   Code Status Full Code   Surrogate Decision Maker/ POA      History from:     patient    History of Present Illness:     Chief Complaint: Acute anemia  Susie Good is a 81 y.o. male with a pmh of CAD, chronic GERD, hyperlipidemia, hypertension, history of kidney cyst, hypothyroidism, history of CVA who presents with Acute anemia. The patient presented to the emergency department because of constipation that has been going on for the last 3 days. Patient has Dulcolax and MiraLAX at home patient has been trying to use that with minimal relief. Able to pass gas. Able to eat and drink without feeling hornlike nausea and vomiting. Denies any abdominal pain, abdominal distention, dizziness, lightheadedness, chest pain, shortness of breath. It is also noted the patient is on clindamycin     Review of Systems: Need 10 Elements   Review of Systems   Constitutional:  Positive for unexpected weight change. Negative for appetite change, chills and fever. HENT:  Negative for ear pain, hearing loss, sinus pressure, sinus pain and voice change. Eyes:  Negative for pain, discharge and visual disturbance. Respiratory:  Negative for cough, chest tightness and shortness of breath. Cardiovascular:  Negative for chest pain, palpitations and leg swelling.    Gastrointestinal:  Positive for constipation. Negative for abdominal pain, blood in stool, diarrhea, nausea and vomiting. Genitourinary:  Negative for dysuria and frequency. Musculoskeletal:  Negative for arthralgias and back pain. Neurological:  Positive for dizziness and weakness. Negative for light-headedness and headaches. Psychiatric/Behavioral:  Negative for sleep disturbance. Objective:   No intake or output data in the 24 hours ending 08/17/22 0033   Vitals:   Vitals:    08/16/22 2330   BP: (!) 182/83   Pulse: 55   Resp: 18   Temp: 98.7 °F (37.1 °C)   TempSrc: Oral   SpO2: 100%   Weight: 151 lb 5 oz (68.6 kg)   Height: 5' 11\" (1.803 m)       Medications Prior to Admission     Prior to Admission medications    Medication Sig Start Date End Date Taking? Authorizing Provider   polyethylene glycol (MIRALAX) 17 g packet Take 17 g by mouth daily as needed for Constipation 8/13/22 8/18/22  Abimael Marie,    levothyroxine (SYNTHROID) 75 MCG tablet  5/4/22   Historical Provider, MD   omeprazole (PRILOSEC) 40 MG delayed release capsule  3/21/22   Historical Provider, MD   losartan-hydroCHLOROthiazide (HYZAAR) 100-25 MG per tablet Take 1 tablet by mouth daily 4/26/22   Sam Patel MD   alfuzosin (UROXATRAL) 10 MG extended release tablet Take 10 mg by mouth daily    Historical Provider, MD   potassium chloride (KLOR-CON) 10 MEQ extended release tablet Take 20 mEq by mouth daily    Historical Provider, MD   traZODone (DESYREL) 50 MG tablet TAKE 2 TABLETS BY MOUTH AT BEDTIME 8/25/20   Historical Provider, MD   doxazosin (CARDURA) 4 MG tablet 8 mg  10/1/19   Historical Provider, MD   nebivolol (BYSTOLIC) 10 MG tablet Take by mouth daily    Historical Provider, MD   vitamin B-12 (CYANOCOBALAMIN) 1000 MCG tablet Take 1,000 mcg by mouth daily    Historical Provider, MD   simvastatin (ZOCOR) 10 MG tablet Take 10 mg by mouth nightly    Historical Provider, MD   ezetimibe (ZETIA) 10 MG tablet Take 10 mg by mouth daily.     Historical Provider, MD   esomeprazole Magnesium (NEXIUM) 40 MG PACK Take 40 mg by mouth daily. Historical Provider, MD   clorazepate (TRANXENE) 7.5 MG tablet Take 7.5 mg by mouth 2 times daily. Historical Provider, MD   Cholecalciferol (VITAMIN D) 2000 UNITS CAPS capsule Take  by mouth daily. Historical Provider, MD   sertraline (ZOLOFT) 50 MG tablet Take 50 mg by mouth daily. Historical Provider, MD       Physical Exam: Need 8 Elements   Physical Exam  Vitals reviewed. Constitutional:       Appearance: Normal appearance. He is normal weight. HENT:      Head: Normocephalic. Nose: Nose normal.      Mouth/Throat:      Mouth: Mucous membranes are moist.   Eyes:      Conjunctiva/sclera: Conjunctivae normal.      Pupils: Pupils are equal, round, and reactive to light. Cardiovascular:      Rate and Rhythm: Normal rate and regular rhythm. Pulses: Normal pulses. Heart sounds: Normal heart sounds. No murmur heard. Pulmonary:      Effort: Pulmonary effort is normal.      Breath sounds: Normal breath sounds. No wheezing, rhonchi or rales. Abdominal:      General: Abdomen is flat. Bowel sounds are normal. There is no distension. Palpations: Abdomen is soft. Tenderness: There is no abdominal tenderness. Musculoskeletal:         General: No deformity. Normal range of motion. Cervical back: Normal range of motion and neck supple. Right lower leg: No edema. Left lower leg: No edema. Skin:     Coloration: Skin is not jaundiced or pale. Neurological:      General: No focal deficit present. Mental Status: He is alert and oriented to person, place, and time. Mental status is at baseline. Motor: Weakness present.           Past History:   PMHx   Past Medical History:   Diagnosis Date    Arthritis     CAD (coronary artery disease)     GERD (gastroesophageal reflux disease)     Hyperlipidemia     Hypertension     Kidney cysts     Right kidney    Thyroid disease     Unspecified cerebral artery occlusion with cerebral infarction         PSHX:  has a past surgical history that includes fracture surgery; Prostate biopsy; Coronary angioplasty with stent; Colonoscopy (8/12/13); Endoscopy, colon, diagnostic (8/12/13); Upper gastrointestinal endoscopy (N/A, 2/6/2020); Colonoscopy (N/A, 2/8/2020); and Cataract removal (2021). Fam HX: family history includes Alzheimer's Disease in his father; Bleeding Prob in his mother; Depression in his sister; Heart Failure in his mother; High Blood Pressure in his daughter; Kidney Disease in his mother; Osteoarthritis in his sister. Soc HX:   Social History     Socioeconomic History    Marital status:    Tobacco Use    Smoking status: Never    Smokeless tobacco: Never   Substance and Sexual Activity    Alcohol use: No    Drug use: No       Allergies: Allergies: Allergies   Allergen Reactions    Erythromycin     Pcn [Penicillins]        Medications:   Medications:    tamsulosin  0.4 mg Oral Daily    levothyroxine  75 mcg Oral Daily    sertraline  50 mg Oral Daily    atorvastatin  10 mg Oral Daily    traZODone  50 mg Oral Nightly    sodium chloride flush  5-40 mL IntraVENous 2 times per day    enoxaparin  30 mg SubCUTAneous Daily    pantoprazole  40 mg Oral QAM AC      Infusions:    sodium chloride      lactated ringers       PRN Meds: sodium chloride flush, 5-40 mL, PRN  sodium chloride, , PRN  ondansetron, 4 mg, Q8H PRN   Or  ondansetron, 4 mg, Q6H PRN  polyethylene glycol, 17 g, Daily PRN  acetaminophen, 650 mg, Q6H PRN   Or  acetaminophen, 650 mg, Q6H PRN  sennosides, 5 mL, BID PRN      Labs      CBC: No results for input(s): WBC, HGB, PLT in the last 72 hours. BMP:  No results for input(s): NA, K, CL, CO2, BUN, CREATININE, GLUCOSE in the last 72 hours. Hepatic: No results for input(s): AST, ALT, ALB, BILITOT, ALKPHOS in the last 72 hours.   Lipids:   Lab Results   Component Value Date/Time    CHOL 116 06/26/2020 uropathy identified. 4. No bowel obstruction. 5. Severe atherosclerotic disease. XR CHEST PORTABLE    Result Date: 8/13/2022  EXAMINATION: ONE XRAY VIEW OF THE CHEST 8/13/2022 3:51 pm COMPARISON: Chest radiograph 2020. HISTORY: ORDERING SYSTEM PROVIDED HISTORY: chest pain TECHNOLOGIST PROVIDED HISTORY: Reason for exam:->chest pain Reason for Exam: pt stated hasn't felt well in 2 weeks, has a tooth infection, took antibiotics, still having pain, and fever FINDINGS: Single view provided. The mediastinal, cardiac, and hilar silhouettes are normal.  Normal lung volumes. No diffuse interstitial edema or acute consolidation. No pulmonary mass. No pneumothorax or free subdiaphragmatic air. The visualized bowel gas pattern is normal.     No acute abnormality.        Personally reviewed Lab Studies, Imaging, and discussed case with Dr. Olena Evans    Electronically signed by Olena Evans MD, MD on 8/17/2022 at 12:33 AM

## 2022-08-17 NOTE — CARE COORDINATION
Met c pt and pt's dghtr and son in law at bedside to initiate discharge planning. Pt lives at home with 1/2 bath downstairs and full bath/bedroom upstairs. Prior to admit pt had been ind with ADLs, used a cane and shower chair. Pt has pcp/ active insurance and was driving. Pt advised of CM role and explained I will return to continue discharge planning once therapy evals and makes recommendations. Pt expressed understanding. Therapy rec'd possible ARU- asked [place] to review.

## 2022-08-17 NOTE — CONSULTS
Initial Psychiatric History and Physical    Jerilyn Carson  [id number]  8/16/2022 08/17/22    ID: Patient is a 81 yrs y.o. male    CC:I have weak legs and recently started tremoring in both my hands    HPI: Jerilyn Carson is a 81 y.o. male with a pmh of CAD, chronic GERD, hyperlipidemia, hypertension, history of kidney cyst, hypothyroidism, history of CVA who presents with Acute anemia. Psychiatry consulted by Dr Fallon Mathew due to \"anxiety, insomnia, taken off clonzaepam 2 months ago. \"    Met with patient at bedside. He is alert and oriented x 4. He denies SI/HI. Denies auditory and visual hallucinations. He notes he was recently taken off tranzene and has not been sleeping and is anxious. Pt stated that he rates his depression a \"9,\" on a scale of 0-10 with 10 being the worst and 0 being none. Pt stated that he rates his anxiety an \"10/10,\" on the same scale. Other s/s include loss of weight (over 30 pounds) upper and lower extremity tremors. Upon physical exam, there do not appear to be cogwheeling or clonus/myoclonus jerks. He is quite knowledgeable and pleasant. He states he really wants to sleep. Multiple medications are not appropriate due to OLIVER 3. Discussed options and patient is aware of SE. Insight and judgment are appropriate. Patient lives alone and his sister lives nearby. Patient's daughter is coping with Breast CA and is limited in how she can help. Discussed alert line bracelets or necklace and possible private duty. Patient will require assistance in obtaining. Past Psychiatric History:   Patient has been taking sertraline 50 mg po daily for several years. He was recently stopped rx tranxene (clorazepate) several months ago.      Family Psychiatric History:   Family History   Problem Relation Age of Onset    Heart Failure Mother     Kidney Disease Mother     Bleeding Prob Mother     Alzheimer's Disease Father     Depression Sister     Osteoarthritis Sister     High Blood Pressure Daughter Allergies:   Allergies   Allergen Reactions    Erythromycin     Pcn [Penicillins]         OBJECTIVE  Vital Signs:  Vitals:    08/17/22 0759   BP: (!) 172/70   Pulse: 61   Resp: 18   Temp: 98.4 °F (36.9 °C)   SpO2: 97%       Labs:  Recent Results (from the past 48 hour(s))   Ammonia    Collection Time: 08/17/22 12:27 AM   Result Value Ref Range    Ammonia 22 16 - 60 UMOL/L   Basic Metabolic Panel    Collection Time: 08/17/22 12:27 AM   Result Value Ref Range    Sodium 139 135 - 145 MMOL/L    Potassium 4.3 3.5 - 5.1 MMOL/L    Chloride 106 99 - 110 mMol/L    CO2 20 (L) 21 - 32 MMOL/L    Anion Gap 13 4 - 16    BUN 31 (H) 6 - 23 MG/DL    Creatinine 2.4 (H) 0.9 - 1.3 MG/DL    Glucose 92 70 - 99 MG/DL    Calcium 9.3 8.3 - 10.6 MG/DL    GFR Non- 26 (L) >60 mL/min/1.73m2    GFR  31 (L) >60 mL/min/1.73m2   CBC    Collection Time: 08/17/22 12:27 AM   Result Value Ref Range    WBC 5.9 4.0 - 10.5 K/CU MM    RBC 3.37 (L) 4.6 - 6.2 M/CU MM    Hemoglobin 9.6 (L) 13.5 - 18.0 GM/DL    Hematocrit 29.3 (L) 42 - 52 %    MCV 86.9 78 - 100 FL    MCH 28.5 27 - 31 PG    MCHC 32.8 32.0 - 36.0 %    RDW 14.4 11.7 - 14.9 %    Platelets 882 (L) 613 - 440 K/CU MM    MPV 9.0 7.5 - 11.1 FL   Magnesium    Collection Time: 08/17/22 12:27 AM   Result Value Ref Range    Magnesium 1.9 1.8 - 2.4 mg/dl   Phosphorus    Collection Time: 08/17/22 12:27 AM   Result Value Ref Range    Phosphorus 3.2 2.5 - 4.9 MG/DL   T3, Free    Collection Time: 08/17/22 12:27 AM   Result Value Ref Range    T3, Free 2.1 (L) 2.3 - 4.2 PG/ML   T4, Free    Collection Time: 08/17/22 12:27 AM   Result Value Ref Range    T4 Free 1.25 0.9 - 1.8 NG/DL   TSH    Collection Time: 08/17/22 12:27 AM   Result Value Ref Range    TSH, High Sensitivity 0.015 (L) 0.270 - 4.20 uIu/ml   EKG 12 Lead    Collection Time: 08/17/22 12:50 AM   Result Value Ref Range    Ventricular Rate 58 BPM    Atrial Rate 58 BPM    P-R Interval 160 ms    QRS Duration 74 ms    Q-T Interval 434 ms    QTc Calculation (Bazett) 426 ms    P Axis 26 degrees    R Axis 34 degrees    T Axis 39 degrees    Diagnosis       Sinus bradycardia  Otherwise normal ECG  When compared with ECG of 13-AUG-2022 15:15,  No significant change was found         Review of Systems:  Reports of no current cardiovascular, respiratory, gastrointestinal, genitourinary, integumentary, neurological, muscuoskeletal, or immunological symptoms today. PSYCHIATRIC: See HPI above. PSYCHIATRIC EXAMINATION / MENTAL STATUS EXAM    CONSTITUTIONAL:    Vitals:   Vitals:    08/17/22 0759   BP: (!) 172/70   Pulse: 61   Resp: 18   Temp: 98.4 °F (36.9 °C)   SpO2: 97%      General appearance: [x] appears age, []  appears older than stated age,               [x]  adequately dressed and groomed, [] disheveled,               [x]  in no acute distress[],  appears mildly distressed, [] other           MUSCULOSKELETAL:   Gait:   [] normal, [] antalgic, [] unsteady, [] gait not evaluated   Station:             [] erect, [] sitting, [x] recumbent, [] other        Strength/tone:  [x] muscle strength and tone appear consistent with age and                                        condition     [] atrophy      [] abnormal movements          Vitals: Blood pressure (!) 172/70, pulse 61, temperature 98.4 °F (36.9 °C), temperature source Oral, resp. rate 18, height 5' 11\" (1.803 m), weight 142 lb 3.2 oz (64.5 kg), SpO2 97 %. CONSTITUTIONAL:    Appearance: appears stated age. alert and oriented to person, place, time & situation. no acute distress. Adequate grooming and hygeine. Good eye contact. No prominent physical abnormalities. Attitude: Manner is cooperative and pleasant  Motor: No psychomotor agitation, retardation or abnormal movements noted- noticed upper extremity tremors  Speech: Clearly articulated; normal rate, volume, tone & amount.   Language: intact understanding and production  Mood:depressed  Affect: flat  Thought Production: Spontaneous. Thought Form: Coherent, linear, logical & goal-directed. No tangentiality or circumstantiality. No flight of ideas or loosening of associations. Thought Content/Perceptions: No LUIS, no AVH, no delusion  Insight:good  Judgment- adequate  Memory: Immediate, recent, and remote appear intact, though not formally tested. 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 Discussed SE, safety and medication properties. Do NOT recommend mirtazapine, buspar due to kidney injury for anxiety  Do not recommend doxepin for sleep as similar to other medication patient notes he takes and this would most likely result in an error. Patient has doxazosin that was on home meds but not rx. Recommend vistaril 25 mg po daily prn for anxiety. Recommend increasing sertraline 100 mg po at bedtime for depression. Request CM assist with alert bracelets/neclace and possible private duty as patient is having difficulty with many things at home. Psychiatry will follow loosely but please call if required sooner  Thank you for this consult  PS to Dr Loni Cruz regarding recs.     Electronically signed by EVERTON Lomeli CNP on 8/17/2022 at 11:59 AM

## 2022-08-17 NOTE — PROGRESS NOTES
Comprehensive Nutrition Assessment    Type and Reason for Visit:  Initial, Consult (unintentional weight loss)    Nutrition Recommendations/Plan:   Continue regular diet per order    Will offer standard oral nutrition supplement  Encourage consistent intake at 3 meals each day   Will continue to follow up during stay      Malnutrition Assessment:  Malnutrition Status:  Insufficient data (08/17/22 1526)    Context:  Chronic Illness       Nutrition Assessment:    Admit with weakness, debility, anemia, depression, constipation. Currently on regular diet, limited po data at this time. Progressive weight loss in past several years per hx ~30# total, 10% loss in past year with current BMI low for age. Will follow at high nutrition risk at this time. Plan to offer high calorie supplements during stay. Nutrition Related Findings:    meeting with physician on visit, hx CAD, HTN, CKD Wound Type: None       Current Nutrition Intake & Therapies:    Average Meal Intake: Unable to assess  Average Supplements Intake: None Ordered  ADULT DIET; Regular    Anthropometric Measures:  Height: 5' 11\" (180.3 cm)  Ideal Body Weight (IBW): 172 lbs (78 kg)       Current Body Weight: 142 lb 3.2 oz (64.5 kg), 82.7 % IBW.  Weight Source: Bed Scale  Current BMI (kg/m2): 19.8  Usual Body Weight: 158 lb (71.7 kg) (last september, 2019 wt 172#)  % Weight Change (Calculated): -10  Weight Adjustment For: No Adjustment                 BMI Categories: Underweight (BMI less than 22) age over 72    Estimated Daily Nutrient Needs:  Energy Requirements Based On: Kcal/kg  Weight Used for Energy Requirements: Current  Energy (kcal/day): 5374-5422 (25-30 olive/kg)  Weight Used for Protein Requirements: Current  Protein (g/day): 64-77 (1-1.2 g/kg)  Method Used for Fluid Requirements: 1 ml/kcal  Fluid (ml/day): 1900    Nutrition Diagnosis:   Predicted inadequate energy intake related to other (comment), psychological cause or life stress, altered GI function (reduced appetite in illness) as evidenced by constipation, poor intake prior to admission, weight loss, BMI    Nutrition Interventions:   Food and/or Nutrient Delivery: Continue Current Diet, Start Oral Nutrition Supplement  Nutrition Education/Counseling: No recommendation at this time  Coordination of Nutrition Care: Continue to monitor while inpatient       Goals:     Goals: PO intake 50% or greater, by next RD assessment       Nutrition Monitoring and Evaluation:   Behavioral-Environmental Outcomes: None Identified  Food/Nutrient Intake Outcomes: Diet Advancement/Tolerance, Food and Nutrient Intake, Supplement Intake  Physical Signs/Symptoms Outcomes: Biochemical Data, Meal Time Behavior, Nutrition Focused Physical Findings, Skin, Weight, Constipation    Discharge Planning:    Continue current diet     Alek Tellez RD, LD  Contact: [phone]

## 2022-08-17 NOTE — CONSULTS
CARDIOLOGY CONSULT NOTE   Reason for consultation:  palpitations    Referring physician:  Bharath Bates MD     Primary care physician: Denise Scott MD      Dear  Dr. Bharath Bates MD   Thanks for the consult. Chief Complaints : Tremors and palpitations    History of present illness:Luca is a 81 y. o.year old who presents with primarily complaining of generalized weakness tremors unable to sleep at night associated with weight loss of 20 pounds night sweats chills and drenching sweats at home at night. Was found to be anemic and hyperthyroid on admission  Cardiology consulted due to concerns for palpitations shortness of breath abdominal discomfort  His cardiac history includes remote history of PCI and coronary artery disease. Does have renal insufficiency and history of MGUS with anemia  Scribes palpitations shortness of breath tremors weakness and shaking according to his daughter he was too weak to stand up on his own he is noted to be orthostatic baseline blood pressure is running high    Past medical history:    has a past medical history of Arthritis, CAD (coronary artery disease), GERD (gastroesophageal reflux disease), Hyperlipidemia, Hypertension, Kidney cysts, Thyroid disease, and Unspecified cerebral artery occlusion with cerebral infarction. Past surgical history:   has a past surgical history that includes fracture surgery; Prostate biopsy; Coronary angioplasty with stent; Colonoscopy (8/12/13); Endoscopy, colon, diagnostic (8/12/13); Upper gastrointestinal endoscopy (N/A, 2/6/2020); Colonoscopy (N/A, 2/8/2020); and Cataract removal (2021). Social History:   reports that he has never smoked. He has never used smokeless tobacco. He reports that he does not drink alcohol and does not use drugs.   Family history:   no family history of CAD, STROKE of DM at early age    Allergies   Allergen Reactions    Erythromycin     Pcn [Penicillins]        tamsulosin (FLOMAX) capsule 0.4 mg, Daily  levothyroxine (SYNTHROID) tablet 75 mcg, Daily  atorvastatin (LIPITOR) tablet 10 mg, Daily  sodium chloride flush 0.9 % injection 5-40 mL, 2 times per day  sodium chloride flush 0.9 % injection 5-40 mL, PRN  0.9 % sodium chloride infusion, PRN  ondansetron (ZOFRAN-ODT) disintegrating tablet 4 mg, Q8H PRN   Or  ondansetron (ZOFRAN) injection 4 mg, Q6H PRN  acetaminophen (TYLENOL) tablet 650 mg, Q6H PRN   Or  acetaminophen (TYLENOL) suppository 650 mg, Q6H PRN  sennosides (SENOKOT) 8.8 MG/5ML syrup 5 mL, BID PRN  lactated ringers infusion, Continuous  pantoprazole (PROTONIX) tablet 40 mg, QAM AC  polyethylene glycol (GLYCOLAX) packet 17 g, Daily  [START ON 8/18/2022] sertraline (ZOLOFT) tablet 100 mg, Daily  QUEtiapine (SEROQUEL) tablet 12.5 mg, Nightly  hydrOXYzine pamoate (VISTARIL) capsule 25 mg, Daily PRN  [START ON 8/18/2022] heparin (porcine) injection 5,000 Units, 3 times per day  vitamin B-12 (CYANOCOBALAMIN) tablet 1,000 mcg, Daily  ezetimibe (ZETIA) tablet 10 mg, Daily  metoprolol succinate (TOPROL XL) extended release tablet 100 mg, Daily      Current Facility-Administered Medications   Medication Dose Route Frequency Provider Last Rate Last Admin    tamsulosin (FLOMAX) capsule 0.4 mg  0.4 mg Oral Daily Homer Araiza MD   0.4 mg at 08/17/22 0829    levothyroxine (SYNTHROID) tablet 75 mcg  75 mcg Oral Daily Homer Araiza MD   75 mcg at 08/17/22 0055    atorvastatin (LIPITOR) tablet 10 mg  10 mg Oral Daily Homer Araiza MD   10 mg at 08/17/22 0829    sodium chloride flush 0.9 % injection 5-40 mL  5-40 mL IntraVENous 2 times per day Hmoer Araiza MD        sodium chloride flush 0.9 % injection 5-40 mL  5-40 mL IntraVENous PRN Homer Araiza MD        0.9 % sodium chloride infusion   IntraVENous PRN Homer Araiza MD        ondansetron (ZOFRAN-ODT) disintegrating tablet 4 mg  4 mg Oral Q8H PRN Homer Araiza MD        Or    ondansetron (ZOFRAN) injection 4 mg  4 mg IntraVENous Q6H PRN Homer Araiza MD        acetaminophen (TYLENOL) tablet 650 mg  650 mg Oral Q6H PRN Homer Araiza MD        Or    acetaminophen (TYLENOL) suppository 650 mg  650 mg Rectal Q6H PRN Homer Araiza MD        sennosides (SENOKOT) 8.8 MG/5ML syrup 5 mL  5 mL Oral BID PRN Homer Araiza MD        lactated ringers infusion   IntraVENous Continuous Doslisa aMckenzie MD 75 mL/hr at 08/17/22 0055 New Bag at 08/17/22 0055    pantoprazole (PROTONIX) tablet 40 mg  40 mg Oral QAM AC Homer Araiza MD   40 mg at 08/17/22 0602    polyethylene glycol (GLYCOLAX) packet 17 g  17 g Oral Daily Doslisa Mackenzie MD   17 g at 08/17/22 1128    [START ON 8/18/2022] sertraline (ZOLOFT) tablet 100 mg  100 mg Oral Daily EVERTON Mandujano CNP        QUEtiapine (SEROQUEL) tablet 12.5 mg  12.5 mg Oral Nightly EVERTON Mandujano CNP        hydrOXYzine pamoate (VISTARIL) capsule 25 mg  25 mg Oral Daily PRN EVERTON Mandujano CNP        [START ON 8/18/2022] heparin (porcine) injection 5,000 Units  5,000 Units SubCUTAneous 3 times per day Elina Mackenzie MD        vitamin B-12 (CYANOCOBALAMIN) tablet 1,000 mcg  1,000 mcg Oral Daily Doslisa Mackenzie MD        ezetimibe (ZETIA) tablet 10 mg  10 mg Oral Daily Doslisa Mackenzie MD        metoprolol succinate (TOPROL XL) extended release tablet 100 mg  100 mg Oral Daily Doslisa Mackenzie MD         Review of Systems:   Constitutional: No Fever or Weight Loss   Eyes: No Decreased Vision  ENT: No Headaches, Hearing Loss or Vertigo  Cardiovascular: As per HPI  Respiratory: As per HPI  Gastrointestinal: No abdominal pain, appetite loss, blood in stools, constipation, diarrhea or heartburn  Genitourinary: No dysuria, trouble voiding, or hematuria  Musculoskeletal:  No gait disturbance, weakness or joint complaints  Integumentary: No rash or pruritis  Neurological: No TIA or stroke symptoms  Psychiatric: No anxiety or depression  Endocrine: No malaise, fatigue or temperature intolerance  Hematologic/Lymphatic: No bleeding problems, blood clots or swollen lymph nodes  Allergic/Immunologic: No nasal congestion or hives  All systems negative except as marked. Physical Examination:    Vitals:    08/17/22 0759 08/17/22 1406 08/17/22 1408 08/17/22 1410   BP: (!) 172/70 (!) 179/83 (!) 191/90 (!) 148/75   Pulse: 61 79 70 76   Resp: 18 20 16 17   Temp: 98.4 °F (36.9 °C)   98.4 °F (36.9 °C)   TempSrc: Oral   Oral   SpO2: 97%   97%   Weight:       Height:           General Appearance:  No distress, conversant    Constitutional:  Well developed, Well nourished, No acute distress, Non-toxic appearance. HENT:  Normocephalic, Atraumatic, Bilateral external ears normal, Oropharynx moist, No oral exudates, Nose normal. Neck- Normal range of motion, No tenderness, Supple, No stridor,no apical-carotid delay  Lymphatics : no palpable lymph nodes  Eyes:  PERRL, EOMI, Conjunctiva normal, No discharge. Respiratory:  Normal breath sounds, No respiratory distress, No wheezing, No chest tenderness. ,no use of accessory muscles, crackles Absent   Cardiovascular: (PMI) apex non displaced,no lifts no thrills, ankle swelling Absent  , 1+, s1 and s2 audible,Murmur. Absent , JVD not noted    Abdomen /GI:  Bowel sounds normal, Soft, No tenderness, No masses, No gross visceromegaly   :  No costovertebral angle tenderness   Musculoskeletal:  No edema, no tenderness, no deformities. Back- no tenderness  Integument:  Well hydrated, no rash   Lymphatic:  No lymphadenopathy noted   Neurologic:  Alert & oriented x 3, CN 2-12 normal, normal motor function, normal sensory function, no focal deficits noted           Medical decision making and Data review:    Lab Review   Recent Labs     08/17/22  0027   WBC 5.9   HGB 9.6*   HCT 29.3*   *      Recent Labs     08/17/22  0027      K 4.3      CO2 20*   PHOS 3.2   BUN 31*   CREATININE 2.4*     No results for input(s): AST, ALT, ALB, BILIDIR, BILITOT, ALKPHOS in the last 72 hours.   No results for input(s): TROPONINT in the last 72 hours. No results for input(s): PROBNP in the last 72 hours. Lab Results   Component Value Date    INR 1.21 02/08/2020    PROTIME 14.7 (H) 02/08/2020       EKG: (reviewed by myself)    ECHO:(reviewed by myself)    Chest Xray:(reviewed by myself)  CT ABDOMEN PELVIS WO CONTRAST Additional Contrast? None    Result Date: 8/13/2022  EXAMINATION: CT OF THE ABDOMEN AND PELVIS WITHOUT CONTRAST 8/13/2022 6:30 pm TECHNIQUE: CT of the abdomen and pelvis was performed without the administration of intravenous contrast. Multiplanar reformatted images are provided for review. Automated exposure control, iterative reconstruction, and/or weight based adjustment of the mA/kV was utilized to reduce the radiation dose to as low as reasonably achievable. COMPARISON: CT abdomen pelvis November 30, 2021; CT abdomen pelvis November 14, 2019; CT abdomen pelvis June 8, 2011. HISTORY: ORDERING SYSTEM PROVIDED HISTORY: abd pain TECHNOLOGIST PROVIDED HISTORY: Reason for exam:->abd pain Additional Contrast?->None Decision Support Exception - unselect if not a suspected or confirmed emergency medical condition->Emergency Medical Condition (MA) Reason for Exam: low abdomen pain FINDINGS: Lower Chest: Dependent atelectasis at the lung bases. Organs: Evaluation of the solid organs is limited due to lack of intravenous contrast.  The nonenhanced liver appears grossly unremarkable. The gallbladder is collapsed not well evaluated. No surrounding stranding or inflammatory change identified at the gallbladder fossa. The nonenhanced spleen, pancreas, adrenal glands, and kidneys demonstrate no acute abnormality. Similar appearance of partially calcified cystic lesion of the superior pole of the right kidney. This is grossly unchanged in size compared with numerous previous exams dating back to 2011. Simple exophytic cyst of the left kidney.   No additional routine follow-up per current guidelines given long-term stability and overall simple appearance of the cyst.  Punctate nonobstructing bilateral renal stones. No hydronephrosis or obstructive uropathy identified. GI/Bowel: No bowel obstruction identified. Colonic diverticulosis without CT evidence of diverticulitis. Normal appendix. Small bowel is normal in caliber. Pelvis: The bladder in solid pelvic organs demonstrate no acute findings. Penile implant partially imaged. Peritoneum/Retroperitoneum: The abdominal aorta is stable in appearance with severe calcified atherosclerotic plaque throughout. No retroperitoneal adenopathy. No free fluid or free air identified. Bones/Soft Tissues: No acute osseous or soft tissue abnormality identified. Postoperative changes of vertebroplasty noted at the L3, L4, and L5 levels. Chronic compression deformity of the T12 vertebral body. 1. No acute intraabdominal process identified. 2. Colonic diverticulosis without CT evidence of diverticulitis. 3. Punctate nonobstructing bilateral renal stones. No obstructive uropathy identified. 4. No bowel obstruction. 5. Severe atherosclerotic disease. XR CHEST PORTABLE    Result Date: 8/13/2022  EXAMINATION: ONE XRAY VIEW OF THE CHEST 8/13/2022 3:51 pm COMPARISON: Chest radiograph 2020. HISTORY: ORDERING SYSTEM PROVIDED HISTORY: chest pain TECHNOLOGIST PROVIDED HISTORY: Reason for exam:->chest pain Reason for Exam: pt stated hasn't felt well in 2 weeks, has a tooth infection, took antibiotics, still having pain, and fever FINDINGS: Single view provided. The mediastinal, cardiac, and hilar silhouettes are normal.  Normal lung volumes. No diffuse interstitial edema or acute consolidation. No pulmonary mass. No pneumothorax or free subdiaphragmatic air. The visualized bowel gas pattern is normal.     No acute abnormality. All labs, medications and tests reviewed by myself including data  from outside source , patient and available family .   Continue all other medications of all above medical condition listed as is. Impression:  Principal Problem:    Acute anemia  Active Problems:    Generalized weakness    HA (generalized anxiety disorder)    MDD (major depressive disorder), recurrent episode, moderate (HCC)  Resolved Problems:    * No resolved hospital problems. *      Assessment: 81 y. o.year old with PMH of  has a past medical history of Arthritis, CAD (coronary artery disease), GERD (gastroesophageal reflux disease), Hyperlipidemia, Hypertension, Kidney cysts, Thyroid disease, and Unspecified cerebral artery occlusion with cerebral infarction. Plan and Recommendations:     palpitations weakness with high blood pressure at baseline EKG shows normal sinus rhythm  For coronary artery disease we will get echo and stress test Lexiscan  TSH is quite low is being evaluated by endocrinology  Will get echo we will get an outpatient 30-day monitor and monitor him on telemetry while here in the hospital  With compression stockings and concerns about him using Flomax and being orthostatic allow blood pressure to be in 349F systolic  HTN: stable, continue present medications and add losartan only if blood pressures more than 150s or 160s consistently  Agree with hematology, endocrinology work-up due to night sweats and weight loss  DVT prophylaxis if no contraindication  6. Dyslipidemia: continue statins           Thank you  much for consult and giving us the opportunity in contributing in the care of this patient. Please feel free to call me for any questions.        Meghna Chávez MD, 8/17/2022 3:23 PM

## 2022-08-17 NOTE — CONSULTS
Endocrinology   Consult Note  8/16/2022 11:17 PM     Primary Care provider: Mahendra Sims MD     Referring physician:  lOu Anderson MD     Dear Doctor Abbi Cagle     Thank You for the Consult     Pt. Was Admitted for : Generalized weakness weight loss    Reason for Consult: Reviewed thyroid function test question of need adjustment      History Obtained From:  Patient/ EMR       HISTORY OF PRESENT ILLNESS:                The patient is a 81 y.o. male with significant past medical history of arthritis, CAD, coronary angioplasty with stent GERD, pretension, hyperlipidemia, has been complaining of generalized weakness weight loss palpitation terms of hyperthyroidism was admitted to hospital with generalized weakness again also patient failed on at home. He was in the investigated for including having bone marrow for pancytopenia. I was  consulted for better elevated thyroid function test.  Weakness    ROS:   Pt's ROS done in detail. Abnormal ROS are noted in Medical and Surgical History Section below:      Other Medical History:        Diagnosis Date    Arthritis     CAD (coronary artery disease)     GERD (gastroesophageal reflux disease)     Hyperlipidemia     Hypertension     Kidney cysts     Right kidney    Thyroid disease     Unspecified cerebral artery occlusion with cerebral infarction      Surgical History:        Procedure Laterality Date    CATARACT REMOVAL  2021    COLONOSCOPY  8/12/13    divertics    COLONOSCOPY N/A 2/8/2020    COLONOSCOPY DIAGNOSTIC performed by Melvi Jordan MD at [address] [place] [address], COLON, DIAGNOSTIC  8/12/13    egd: normal    FRACTURE SURGERY      PROSTATE BIOPSY      UPPER GASTROINTESTINAL ENDOSCOPY N/A 2/6/2020    EGD DIAGNOSTIC ONLY performed by Melvi Jordan MD at [facility] ENDOSCOPY       Allergies:  Erythromycin and Pcn [penicillins]    Family History:       Problem Relation Age of Onset    Heart Failure Mother     Kidney Disease Mother Bleeding Prob Mother     Alzheimer's Disease Father     Depression Sister     Osteoarthritis Sister     High Blood Pressure Daughter      REVIEW OF SYSTEMS:  Review of System Done as noted above     PHYSICAL EXAM:      Vitals:    BP (!) 172/70   Pulse 61   Temp 98.4 °F (36.9 °C) (Oral)   Resp 18   Ht 5' 11\" (1.803 m)   Wt 142 lb 3.2 oz (64.5 kg)   SpO2 97%   BMI 19.83 kg/m²     CONSTITUTIONAL:  awake, alert, cooperative, appears stated age   EYES:  vision intact Fundoscopic Exam not performed   ENT:Normal  NECK:  Supple, No JVD. Thyroid Exam:Normal   LUNGS:  Has Vesicular Breath Sounds,   CARDIOVASCULAR:  Normal apical impulse, regular rate and rhythm, normal S1 and S2, no S3 or S4, and has no  murmur   ABDOMEN:  No scars, normal bowel sounds, soft, non-distended, non-tender, no masses palpated, no hepatolienomegaly  Musculoskeletal: Normal  Extremities: Normal, peripheral pulses normal, , has no edema   NEUROLOGIC:  Awake, alert, oriented to name, place and time. Cranial nerves II-XII are grossly intact. Motor weakness t. Sensory is intact. ,  and gait is ab an at least normal.  And unstable    DATA:    CBC:   Recent Labs     08/17/22 0027   WBC 5.9   HGB 9.6*   *    CMP:  Recent Labs     08/17/22 0027      K 4.3      CO2 20*   BUN 31*   CREATININE 2.4*   CALCIUM 9.3     Lipids:   Lab Results   Component Value Date/Time    CHOL 116 06/26/2020 01:55 PM    HDL 48 06/26/2020 01:55 PM    TRIG 80 06/26/2020 01:55 PM     Glucose: No results for input(s): POCGLU in the last 72 hours.   Hemoglobin A1C: No results found for: LABA1C  Free T4:   Lab Results   Component Value Date/Time    T4FREE 1.25 08/17/2022 12:27 AM     Free T3:   Lab Results   Component Value Date/Time    FT3 2.1 08/17/2022 12:27 AM     TSH High Sensitivity:   Lab Results   Component Value Date/Time    TSHHS 0.015 08/17/2022 12:27 AM       No orders to display          Scheduled Medicines   Medications:    tamsulosin 0.4 mg Oral Daily    levothyroxine  75 mcg Oral Daily    atorvastatin  10 mg Oral Daily    sodium chloride flush  5-40 mL IntraVENous 2 times per day    enoxaparin  30 mg SubCUTAneous Daily    pantoprazole  40 mg Oral QAM AC    polyethylene glycol  17 g Oral Daily    [START ON 8/18/2022] sertraline  100 mg Oral Daily    QUEtiapine  12.5 mg Oral Nightly      Infusions:    sodium chloride      lactated ringers 75 mL/hr at 08/17/22 0055         IMPRESSION    Patient Active Problem List   Diagnosis    Melena    GI bleed    Acute kidney injury superimposed on CKD (HCC)    Stage 3b chronic kidney disease ([facility] [address]    Persistent proteinuria    Hypertensive heart and renal disease    History of anemia due to CKD    Acute anemia    Generalized weakness    HA (generalized anxiety disorder)    MDD (major depressive disorder), recurrent episode, moderate ([facility] [address]         RECOMMENDATIONS:      Reviewed POC blood glucose . Labs and X ray results   Reviewed Home and Current Medicines   Will hold his Synthroid for next several days patient complaining of symptoms suggestive of hyper thyroidism even though he is serum free T4 was normal.  Will order serum free T3 levels     Will follow with you  Again thank you for sharing pt's care with me.      Truly yours,       Bernie Heart MD

## 2022-08-17 NOTE — CONSULTS
Neurology Service Consult Note  [facility]  Patient Name: Jerilyn Carson  : 1935        Subjective:   Reason for consult: \"tremor, motor weakness, neuropathy, dizzy, right facial numbness, presyncope, falls\"  81 y.o. male with history of CAD, HLD, HTN, Thyroid disease, stroke, presenting to [facility] with complaints of unintended 30 lb weight loss over a year, fatigue, abdominal pain, jaw pain d/t tooth infection. Chart was reviewed in detail. Patient is seen with family at bedside. He reports that he has had bilateral upper extremity tremor over the last week. It is significant enough it is difficult for him to write. He has numbness and tingling in the fingers and toes. He has not been sleeping well. Despite good oral intake he is continuing to lose weight. Patient is alert and oriented x 4. He denies any significant vision change.  He denies extremity weakness and is noted moving from lying to sitting to standing without difficulty    Past Medical History:   Diagnosis Date    Arthritis     CAD (coronary artery disease)     GERD (gastroesophageal reflux disease)     Hyperlipidemia     Hypertension     Kidney cysts     Right kidney    Thyroid disease     Unspecified cerebral artery occlusion with cerebral infarction     :   Past Surgical History:   Procedure Laterality Date    CATARACT REMOVAL      COLONOSCOPY  13    divertics    COLONOSCOPY N/A 2020    COLONOSCOPY DIAGNOSTIC performed by Isha Samano MD at [address] [place] [address], DIAGNOSTIC  13    egd: normal    FRACTURE SURGERY      PROSTATE BIOPSY      UPPER GASTROINTESTINAL ENDOSCOPY N/A 2020    EGD DIAGNOSTIC ONLY performed by Isha Samano MD at [facility] ENDOSCOPY     Medications:  Scheduled Meds:   tamsulosin  0.4 mg Oral Daily    levothyroxine  75 mcg Oral Daily    sertraline  50 mg Oral Daily    atorvastatin  10 mg Oral Daily    traZODone  50 mg Oral Nightly    sodium chloride flush  5-40 mL IntraVENous 2 times per day    enoxaparin  30 mg SubCUTAneous Daily    pantoprazole  40 mg Oral QAM AC    polyethylene glycol  17 g Oral Daily     Continuous Infusions:   sodium chloride      lactated ringers 75 mL/hr at 08/17/22 0055     PRN Meds:.sodium chloride flush, sodium chloride, ondansetron **OR** ondansetron, acetaminophen **OR** acetaminophen, sennosides    Allergies   Allergen Reactions    Erythromycin     Pcn [Penicillins]      Social History     Socioeconomic History    Marital status:       Spouse name: Not on file    Number of children: Not on file    Years of education: Not on file    Highest education level: Not on file   Occupational History    Not on file   Tobacco Use    Smoking status: Never    Smokeless tobacco: Never   Substance and Sexual Activity    Alcohol use: No    Drug use: No    Sexual activity: Not on file   Other Topics Concern    Not on file   Social History Narrative    Not on file     Social Determinants of Health     Financial Resource Strain: Not on file   Food Insecurity: Not on file   Transportation Needs: Not on file   Physical Activity: Not on file   Stress: Not on file   Social Connections: Not on file   Intimate Partner Violence: Not on file   Housing Stability: Not on file      Family History   Problem Relation Age of Onset    Heart Failure Mother     Kidney Disease Mother     Bleeding Prob Mother     Alzheimer's Disease Father     Depression Sister     Osteoarthritis Sister     High Blood Pressure Daughter          ROS (10 systems)  In addition to that documented in the HPI above, the additional ROS was obtained:  Constitutional: Night sweats  Eyes: Denies vision changes  ENMT: Denies sore throat  CV: Denies chest pain  Resp: Denies SOB  GI: Denies vomiting or diarrhea  : Denies painful urination  MSK: Denies recent trauma  Skin: Denies new rashes  Neuro: numbness to fingers and toes  Endocrine: Unintended weight loss over last year   Heme: Denies bleeding disorders    Physical Exam:        Wt Readings from Last 3 Encounters:   08/17/22 142 lb 3.2 oz (64.5 kg)   08/13/22 150 lb (68 kg)   06/06/22 153 lb (69.4 kg)     Temp Readings from Last 3 Encounters:   08/17/22 98.4 °F (36.9 °C) (Oral)   08/13/22 98.5 °F (36.9 °C) (Oral)   06/06/22 97.5 °F (36.4 °C) (Temporal)     BP Readings from Last 3 Encounters:   08/17/22 (!) 172/70   08/13/22 (!) 187/72   06/06/22 (!) 109/59     Pulse Readings from Last 3 Encounters:   08/17/22 61   08/13/22 62   06/06/22 63        Gen: A&O x 4, NAD, cooperative  HEENT: NC/AT, EOMI, PERRL, mmm, no carotid bruits, neck supple, no meningeal signs; Fundoscopic: no disc edema appreciated  Heart: NSR  Lungs: Respirations even and unlabored  Ext: no edema, no calf tenderness b/l  Psych: normal mood and affect  Skin: no rashes or lesions    NEUROLOGIC EXAM:    Mental Status: A&O to self, location, month and year, NAD, speech clear, language fluent, repetition and naming intact, follows commands appropriately    Cranial Nerve Exam:   CN II-XII: PERRL, VFF, no nystagmus, no gaze paresis, sensation V1-V3 intact b/l, muscles of facial expression symmetric; hearing intact to conversational tone, palate elevates symmetrically, shoulder elevation symmetric and tongue protrudes midline with movement side to side.     Motor Exam:       Strength 5/5 UE's/LE's b/l  Tone and bulk normal   No pronator drift    Deep Tendon Reflexes: 2/4 biceps, triceps, brachioradialis, patellar, and achilles b/l; flexor plantar responses b/l    Sensation: Intact light touch/pinprick/vibration UE's/LE's b/l    Coordination/Cerebellum:       Tremors--to bilateral upper extremities, worse with arms extended      Rapidly alternating movements: no dysdiadochokinesia b/l                Heel-to-Shin: no dysmetria b/l      Finger-to-Nose: no dysmetria b/l    Gait and stance:      Gait: deferred      LABS:     Recent Labs     08/17/22  0027   WBC 5.9      K 4.3      CO2 20*   BUN 31*   CREATININE 2.4*   GLUCOSE 92       IMAGING:      No pertinent neuro imaging    ASSESSMENT/PLAN:   81year old male with complaints of unintended weight loss, poor sleep, dizziness, sensory changes to hands and feet. Patient is alert and oriented x 4. EOM's intact, face is symmetrical, speech is clear, language is fluent. He does have mild tremor to upper extremities consistent with essential tremor. He has mild sensory changes to vibration in the lower extremities. New onset tremor, weakness, insomnia, weight loss suspect secondary to hyperthyroidism superimposed on mild peripheral neuropathy which could contribute to balance changes and dizziness. TSH 0.015 - management per IM/Endocrinology  Patient currently on Synthroid 75 mcg  Exam is essentially non-focal. Do not feel that these symptoms have primary neurologic origin. Patient may follow up if pins and needles sensation continues once TSH stabilizes, may follow up for EMG outpatient. No further recommendations, we will sign off at this time. Please contact for any new concerns. > 60 minutes of time spent included chart review, obtaining history, patient examination, developing plan of care, and documentation. Thank you for allowing us to participate in the care of your patient. If there are any questions regarding evaluation please feel free to contact us. Ozella Cogan, APRN - CNP, 8/17/2022       ------------------------------------    Attending Note:  I have rounded on this patient with Lorraine Ervin CNP on 8/17/2022. I have reviewed the chart and we have discussed this case in detail. The patient was seen and examined by myself. Pertinent labs and imaging have been personally reviewed. Our findings and impressions were discussed with the patient. I concur with the Nurse Practioner's assessment and plan.     Suspect much of his symptoms are secondary to hyperthyroidism superimposed upon underlying peripheral neuropathy. Adjustment of Synthroid per IM/endocrinology. Discussed outpatient follow-up with EMG.     Christiano Napoles DO 8/18/2022 1:15 AM

## 2022-08-17 NOTE — CONSULTS
[address] [facility], 1935, 1112/1112-A, 8/17/2022    History  [place]:  There were no encounter diagnoses. Patient  has a past medical history of Arthritis, CAD (coronary artery disease), GERD (gastroesophageal reflux disease), Hyperlipidemia, Hypertension, Kidney cysts, Thyroid disease, and Unspecified cerebral artery occlusion with cerebral infarction. Patient  has a past surgical history that includes fracture surgery; Prostate biopsy; Coronary angioplasty with stent; Colonoscopy (8/12/13); Endoscopy, colon, diagnostic (8/12/13); Upper gastrointestinal endoscopy (N/A, 2/6/2020); Colonoscopy (N/A, 2/8/2020); and Cataract removal (2021). Subjective:  Patient states: \"These tremors started a few weeks ago\". Pain:  no c/o. Communication with other providers:  Handoff to RN, co-eval with OT Mishel Burns. Discussed with RN  Restrictions: Fall risk, tele, Sp02, general     Home Setup/Prior level of function  Social/Functional History  Lives With: Alone  Type of Home: House  Home Layout: Two level, Bed/Bath upstairs, 1/2 bath on main level  Home Access: Stairs to enter with rails (uses back entry- 4-5 steps)  Entrance Stairs - Number of Steps: 4-5  Entrance Stairs - Rails: Left  Bathroom Shower/Tub: Shower chair with back, Tub/Shower unit  Home Equipment: [employer], Rollator, Walker, rolling  Has the patient had two or more falls in the past year or any fall with injury in the past year?: Yes  ADL Assistance: Independent  Homemaking Assistance: Independent  Active : Yes    Examination of body systems (includes body structures/functions, activity/participation limitations):  Observation:  Pt is awake in low-fowlers upon arrival  Vision:  Glasses, WFL  Hearing:  [facility]  Cardiopulmonary:  WFL, vitals stable throughout.  BP supine in bed: 179/83 (110); sitting EOB: 191/90 (107); in stand: 148/75 (95)  Cognition: WFL, pt endorses h/o anxiety, A&O x4, see OT/SLP note for further evaluation. Musculoskeletal  ROM R/L:  WFL BLE. Strength R/L:  BLE 4/5, fair in function and endurance. Neuro:  recent onset tremors in UE with muscle activity, decreased balance, h/o numbness/tingling B toes  Gait pattern: Pt demonstrates AMB with and w/o AD. W/o AD pt with significant postural instability, step-to pattern, and x1 LOB d/t L knee buckling. W/ RW pt with step-through pattern, min trunk sway, no LOB, fair- vasiliy and foot clearance. Mobility:  Supine to sit:  SBA  Transfers: CGA  Sitting balance:  SBA. Standing balance:  Min-CGA. Gait: Min-CGA    AMPA 6 Clicks Inpatient Mobility:  AM-PAC Inpatient Mobility Raw Score : 17    Treatment:    Bed mobility: PT encourages sup>sit, provides v/c for sequencing. Pt demonstrates fair- ability to advance LE, requires SBA for LE/ hips to EOB and increased UE support on bed rail for trunk to upright. PT v/c for scooting to EOB, pt requires SBA and cues to ensure prevention of premature STS. RN requests orthostatic BP measures. Return to supine, SBA. Second trial sup>sit SBA. Sitting balance: Seated EOB pt demonstrates fair balance, intermittent-no UE support required for maintaining upright, SBA. Sit<>Stand: Pt performed STS from EOB to upright with no RW x1 rep, and with RW x1 rep with CGA, v/c for proper sequencing. Pt demonstrates increased time and UE support in push-off to upright. Return to seated at EOB, at recliner pt demonstrates fair- control, v/c for safe sequencing, CGA. Gait: Pt AMB x10 ft in room without RW, additional x50+x45 ft in hallway, chair follow. W/o AD pt with significant postural instability, step-to pattern, and x1 LOB d/t L knee buckling. Pt required Min A to correct this LOB. W/ RW pt with step-through pattern, min trunk sway, no LOB, fair- vasiliy and foot clearance. Pt unable to AMB without assistance. Pt fatigues end of gait trial and required return to seated in recliner CGA.    Pt

## 2022-08-17 NOTE — PROGRESS NOTES
Hospitalist Progress Note      PCP: Heber Arreola MD    Date of Admission: 2022    Chief Complaint on Admission: constipation, lightheadedness, palpitations, weight loss, insomnia    Pt Seen/Examined and Chart Reviewed. Admitting dx acute on chronic CKD    SUBJECTIVE:     Pt reports constipation, insomnia for weeks, very weak, lost 30 lb, anorexia, lightheadedness, new tremors, night sweats, palpitations      OBJECTIVE:   Vitals    TEMPERATURE:  Current - Temp: 98.4 °F (36.9 °C); Max - Temp  Av.6 °F (37 °C)  Min: 98.4 °F (36.9 °C)  Max: 98.8 °F (37.1 °C)  RESPIRATIONS RANGE: Resp  Av.8  Min: 16  Max: 20  PULSE RANGE: Pulse  Av  Min: 55  Max: 79  BLOOD PRESSURE RANGE:  Systolic (65LKB), UMT:067 , Min:143 , IMU:322   ; Diastolic (41FDZ), HMI:08, Min:70, Max:90    PULSE OXIMETRY RANGE: SpO2  Av %  Min: 97 %  Max: 100 %  24HR INTAKE/OUTPUT:    Intake/Output Summary (Last 24 hours) at 2022 1423  Last data filed at 2022 1008  Gross per 24 hour   Intake --   Output 275 ml   Net -275 ml       Exam:    General appearance: Well, no apparent distress, appears stated age and cooperative. HEENT Normal cephalic, atraumatic without obvious deformity. Pupils equal, round, and reactive to light. Extra ocular muscles intact. Conjunctivae/corneas clear. Neck: Supple, No jugular venous distention/bruits. Trachea midline without thyromegaly or adenopathy with full range of motion. Lungs: Clear to ascultation, bilaterally without Rales/Wheezes/Rhonchi with good respiratory effort. Heart: Regular rate and rhythm with Normal S1/S2 without  murmurs, rubs or gallops, point of maximum impulse non-displaced  Abdomen: Soft, non-tender or non-distended without rigidity or guarding and positive bowel sounds all four quadrants. Extremities: No clubbing, cyanosis, or edema bilaterally. Full range of motion without deformity   Skin: Skin color, texture, turgor normal. No rashes or lesions.   Neurologic: Alert and oriented X 3,  neurovascularly intact with sensory/motor intact upper extremities/lower extremities, bilaterally. Cranial nerves:II-XII intact, grossly non-focal.  Mental status: Alert, oriented, thought content appropriate. Data    Recent Labs     08/17/22  0027   WBC 5.9   HGB 9.6*   HCT 29.3*   *      Recent Labs     08/17/22 0027      K 4.3      CO2 20*   PHOS 3.2   BUN 31*   CREATININE 2.4*     No results for input(s): AST, ALT, ALB, BILIDIR, BILITOT, ALKPHOS in the last 72 hours. No results for input(s): INR in the last 72 hours. No results for input(s): CKTOTAL, CKMB, CKMBINDEX, TROPONINI in the last 72 hours.     Imaging/Test Results    CXR neg  CT abd/pelvis nothing acute  Cr 2.4  UA neg  TSH low  Free T4 normal        Consults:     IP CONSULT TO ONCOLOGY  IP CONSULT TO ENDOCRINOLOGY  IP CONSULT TO CARDIOLOGY  IP CONSULT TO NEUROLOGY  IP CONSULT TO PSYCHIATRY  IP CONSULT TO DIETITIAN    Allergies  Erythromycin and Pcn [penicillins]    Medications      Scheduled Meds:   tamsulosin  0.4 mg Oral Daily    levothyroxine  75 mcg Oral Daily    atorvastatin  10 mg Oral Daily    sodium chloride flush  5-40 mL IntraVENous 2 times per day    enoxaparin  30 mg SubCUTAneous Daily    pantoprazole  40 mg Oral QAM AC    polyethylene glycol  17 g Oral Daily    [START ON 8/18/2022] sertraline  100 mg Oral Daily    QUEtiapine  12.5 mg Oral Nightly       Infusions:   sodium chloride      lactated ringers 75 mL/hr at 08/17/22 0055       PRN Meds:  sodium chloride flush, sodium chloride, ondansetron **OR** ondansetron, acetaminophen **OR** acetaminophen, sennosides, hydrOXYzine pamoate    ASSESSMENT AND PLAN      Active Hospital Problems    Diagnosis Date Noted    Generalized weakness [R53.1] 08/17/2022     Priority: Medium    HA (generalized anxiety disorder) [F41.1] 08/17/2022     Priority: Medium    MDD (major depressive disorder), recurrent episode, moderate ([facility] [address] [F33.1] 08/17/2022 Priority: Medium    Acute anemia [D64.9] 08/16/2022     Priority: Medium     Constipation--improved with bowel regimen, CT abd/pelvis nothing acute, improved, pt moving bowels    Palpitations, insomnia, 30 lb weight loss, tremors, night sweats--will check EKG, monitor on cardiac telemetry, consult cardio, order echo, TSH low, endo consulted for abnormal TFTs and symptoms, free T4 normal, will check T3, consult nutrition    Pancytopenia--with 30 lb weight loss, anorexia, h/o bone marrow biopsy with Dr. Corazon Vega, will consult heme for follow up, check iron studies    Anxiety, insomnia, depression--seen by psychiatry, appreciate recs, started seroquel qhs, increased zoloft, prn vistaril for anxiety    Bilateral UE and LE neuropathy, dizziness, weakness, right facial numbness-- neuro consulted, ordered B12, folate, a1c, check orthostatics, monitor on tele, PT/OT    Left sided tooth pain--was on PO clinda 8/4/22, f/u dentist outpatient    Orthostatic hypotension/lightheadedness--stopped home diuretic and doxazosin, avoid dehydration, caffeine, and alcohol, ordered AZUL hose, slow position changes      OLIVER on CKD stage 3b: Avoid nephrotoxic agents. Estimated baseline creatinine of 1.7. 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 [place] lambda ratio normal at 1.03. neph and heme consulted  Coronary artery disease s/p PCI--stable  BPH on Flomax   Hypothyroidism:  On levothyroxine, endo consulted, TSH low   Punctate nonobstructing bilateral renal stones  Colonic diverticulosis  Severe atherosclerotic disease involving aorta    Physical debility--PT/OT      PT/OT Eval Status:ordered    DVT Prophylaxis: SQ heparin  Diet: ADULT DIET;  Regular  Code Status: Full Code      Dispo - f/u endo, heme, neph, cardio, neuro recs, PT/OT, d/c 1-2 days, likely to SNF    Laisha Damon MD

## 2022-08-18 ENCOUNTER — APPOINTMENT (OUTPATIENT)
Dept: CT IMAGING | Age: 87
DRG: 841 | End: 2022-08-18
Attending: INTERNAL MEDICINE
Payer: MEDICARE

## 2022-08-18 ENCOUNTER — APPOINTMENT (OUTPATIENT)
Dept: NUCLEAR MEDICINE | Age: 87
DRG: 841 | End: 2022-08-18
Attending: INTERNAL MEDICINE
Payer: MEDICARE

## 2022-08-18 PROBLEM — R25.1 TREMORS OF NERVOUS SYSTEM: Status: ACTIVE | Noted: 2022-08-18

## 2022-08-18 PROBLEM — R26.89 BALANCE DISORDER: Status: ACTIVE | Noted: 2022-08-18

## 2022-08-18 PROBLEM — G62.9 PERIPHERAL POLYNEUROPATHY: Status: ACTIVE | Noted: 2022-08-18

## 2022-08-18 PROBLEM — D69.6 THROMBOCYTOPENIA (HCC): Status: ACTIVE | Noted: 2022-08-18

## 2022-08-18 LAB
ANION GAP SERPL CALCULATED.3IONS-SCNC: 8 MMOL/L (ref 4–16)
BACTERIA: NEGATIVE /HPF
BILIRUBIN URINE: NEGATIVE MG/DL
BLOOD, URINE: ABNORMAL
BUN BLDV-MCNC: 27 MG/DL (ref 6–23)
CALCIUM SERPL-MCNC: 9 MG/DL (ref 8.3–10.6)
CHLORIDE BLD-SCNC: 106 MMOL/L (ref 99–110)
CLARITY: CLEAR
CO2: 24 MMOL/L (ref 21–32)
COLOR: YELLOW
CREAT SERPL-MCNC: 2.3 MG/DL (ref 0.9–1.3)
CREATININE URINE: 61.4 MG/DL
EOSINOPHIL, URINE: NEGATIVE /HPF
ESTIMATED AVERAGE GLUCOSE: 103 MG/DL
FERRITIN: 313 NG/ML (ref 30–400)
FOLATE: 7.9 NG/ML (ref 3.1–17.5)
GFR AFRICAN AMERICAN: 33 ML/MIN/1.73M2
GFR NON-AFRICAN AMERICAN: 27 ML/MIN/1.73M2
GLUCOSE BLD-MCNC: 109 MG/DL (ref 70–99)
GLUCOSE, URINE: NEGATIVE MG/DL
HBA1C MFR BLD: 5.2 % (ref 4.2–6.3)
HCT VFR BLD CALC: 26.7 % (ref 42–52)
HEMOGLOBIN: 8.9 GM/DL (ref 13.5–18)
IRON: 49 UG/DL (ref 59–158)
KETONES, URINE: NEGATIVE MG/DL
LEUKOCYTE ESTERASE, URINE: NEGATIVE
LV EF: 75 %
LVEF MODALITY: NORMAL
MAGNESIUM: 1.7 MG/DL (ref 1.8–2.4)
MCH RBC QN AUTO: 29.1 PG (ref 27–31)
MCHC RBC AUTO-ENTMCNC: 33.3 % (ref 32–36)
MCV RBC AUTO: 87.3 FL (ref 78–100)
NITRITE URINE, QUANTITATIVE: NEGATIVE
PCT TRANSFERRIN: 22 % (ref 10–44)
PDW BLD-RTO: 14.4 % (ref 11.7–14.9)
PH, URINE: 7 (ref 5–8)
PHOSPHORUS: 3.1 MG/DL (ref 2.5–4.9)
PLATELET # BLD: 124 K/CU MM (ref 140–440)
PMV BLD AUTO: 10.9 FL (ref 7.5–11.1)
POTASSIUM SERPL-SCNC: 4.1 MMOL/L (ref 3.5–5.1)
PROTEIN UA: ABNORMAL MG/DL
RBC # BLD: 3.06 M/CU MM (ref 4.6–6.2)
RBC URINE: 1 /HPF (ref 0–3)
SODIUM BLD-SCNC: 138 MMOL/L (ref 135–145)
SODIUM URINE: 105 MMOL/L (ref 35–167)
SPECIFIC GRAVITY UA: 1.01 (ref 1–1.03)
SQUAMOUS EPITHELIAL: <1 /HPF
TOTAL IRON BINDING CAPACITY: 218 UG/DL (ref 250–450)
TRICHOMONAS: ABNORMAL /HPF
UNSATURATED IRON BINDING CAPACITY: 169 UG/DL (ref 110–370)
UROBILINOGEN, URINE: 0.2 MG/DL (ref 0.2–1)
VITAMIN B-12: >2000 PG/ML (ref 211–911)
WBC # BLD: 6.6 K/CU MM (ref 4–10.5)
WBC UA: <1 /HPF (ref 0–2)

## 2022-08-18 PROCEDURE — 83540 ASSAY OF IRON: CPT

## 2022-08-18 PROCEDURE — 83550 IRON BINDING TEST: CPT

## 2022-08-18 PROCEDURE — 70450 CT HEAD/BRAIN W/O DYE: CPT

## 2022-08-18 PROCEDURE — 71250 CT THORAX DX C-: CPT

## 2022-08-18 PROCEDURE — 99223 1ST HOSP IP/OBS HIGH 75: CPT | Performed by: PSYCHIATRY & NEUROLOGY

## 2022-08-18 PROCEDURE — 1200000000 HC SEMI PRIVATE

## 2022-08-18 PROCEDURE — 6370000000 HC RX 637 (ALT 250 FOR IP): Performed by: HOSPITALIST

## 2022-08-18 PROCEDURE — 93017 CV STRESS TEST TRACING ONLY: CPT

## 2022-08-18 PROCEDURE — 6360000002 HC RX W HCPCS: Performed by: INTERNAL MEDICINE

## 2022-08-18 PROCEDURE — 97530 THERAPEUTIC ACTIVITIES: CPT

## 2022-08-18 PROCEDURE — 3430000000 HC RX DIAGNOSTIC RADIOPHARMACEUTICAL: Performed by: INTERNAL MEDICINE

## 2022-08-18 PROCEDURE — 6370000000 HC RX 637 (ALT 250 FOR IP): Performed by: STUDENT IN AN ORGANIZED HEALTH CARE EDUCATION/TRAINING PROGRAM

## 2022-08-18 PROCEDURE — 81001 URINALYSIS AUTO W/SCOPE: CPT

## 2022-08-18 PROCEDURE — 36415 COLL VENOUS BLD VENIPUNCTURE: CPT

## 2022-08-18 PROCEDURE — 94761 N-INVAS EAR/PLS OXIMETRY MLT: CPT

## 2022-08-18 PROCEDURE — 2580000003 HC RX 258: Performed by: STUDENT IN AN ORGANIZED HEALTH CARE EDUCATION/TRAINING PROGRAM

## 2022-08-18 PROCEDURE — 6370000000 HC RX 637 (ALT 250 FOR IP): Performed by: NURSE PRACTITIONER

## 2022-08-18 PROCEDURE — 99233 SBSQ HOSP IP/OBS HIGH 50: CPT | Performed by: INTERNAL MEDICINE

## 2022-08-18 PROCEDURE — 84100 ASSAY OF PHOSPHORUS: CPT

## 2022-08-18 PROCEDURE — 6370000000 HC RX 637 (ALT 250 FOR IP): Performed by: INTERNAL MEDICINE

## 2022-08-18 PROCEDURE — 87205 SMEAR GRAM STAIN: CPT

## 2022-08-18 PROCEDURE — 78452 HT MUSCLE IMAGE SPECT MULT: CPT

## 2022-08-18 PROCEDURE — 83036 HEMOGLOBIN GLYCOSYLATED A1C: CPT

## 2022-08-18 PROCEDURE — 82570 ASSAY OF URINE CREATININE: CPT

## 2022-08-18 PROCEDURE — 85027 COMPLETE CBC AUTOMATED: CPT

## 2022-08-18 PROCEDURE — 82607 VITAMIN B-12: CPT

## 2022-08-18 PROCEDURE — 82746 ASSAY OF FOLIC ACID SERUM: CPT

## 2022-08-18 PROCEDURE — 83735 ASSAY OF MAGNESIUM: CPT

## 2022-08-18 PROCEDURE — A9500 TC99M SESTAMIBI: HCPCS | Performed by: INTERNAL MEDICINE

## 2022-08-18 PROCEDURE — 80048 BASIC METABOLIC PNL TOTAL CA: CPT

## 2022-08-18 PROCEDURE — 84300 ASSAY OF URINE SODIUM: CPT

## 2022-08-18 PROCEDURE — 97535 SELF CARE MNGMENT TRAINING: CPT

## 2022-08-18 PROCEDURE — 99223 1ST HOSP IP/OBS HIGH 75: CPT | Performed by: INTERNAL MEDICINE

## 2022-08-18 PROCEDURE — 82728 ASSAY OF FERRITIN: CPT

## 2022-08-18 RX ORDER — LANOLIN ALCOHOL/MO/W.PET/CERES
400 CREAM (GRAM) TOPICAL 2 TIMES DAILY
Status: COMPLETED | OUTPATIENT
Start: 2022-08-18 | End: 2022-08-18

## 2022-08-18 RX ORDER — PREGABALIN 25 MG/1
25 CAPSULE ORAL ONCE
Status: COMPLETED | OUTPATIENT
Start: 2022-08-18 | End: 2022-08-18

## 2022-08-18 RX ADMIN — POLYETHYLENE GLYCOL (3350) 17 G: 17 POWDER, FOR SOLUTION ORAL at 09:51

## 2022-08-18 RX ADMIN — KIT FOR THE PREPARATION OF TECHNETIUM TC99M SESTAMIBI 10 MILLICURIE: 1 INJECTION, POWDER, LYOPHILIZED, FOR SOLUTION PARENTERAL at 09:25

## 2022-08-18 RX ADMIN — EZETIMIBE 10 MG: 10 TABLET ORAL at 09:47

## 2022-08-18 RX ADMIN — HEPARIN SODIUM 5000 UNITS: 5000 INJECTION INTRAVENOUS; SUBCUTANEOUS at 04:44

## 2022-08-18 RX ADMIN — METOPROLOL SUCCINATE 100 MG: 50 TABLET, EXTENDED RELEASE ORAL at 09:48

## 2022-08-18 RX ADMIN — QUETIAPINE FUMARATE 12.5 MG: 25 TABLET ORAL at 21:03

## 2022-08-18 RX ADMIN — PREGABALIN 25 MG: 25 CAPSULE ORAL at 23:01

## 2022-08-18 RX ADMIN — HEPARIN SODIUM 5000 UNITS: 5000 INJECTION INTRAVENOUS; SUBCUTANEOUS at 21:03

## 2022-08-18 RX ADMIN — PANTOPRAZOLE SODIUM 40 MG: 40 TABLET, DELAYED RELEASE ORAL at 06:06

## 2022-08-18 RX ADMIN — HEPARIN SODIUM 5000 UNITS: 5000 INJECTION INTRAVENOUS; SUBCUTANEOUS at 14:34

## 2022-08-18 RX ADMIN — ATORVASTATIN CALCIUM 10 MG: 10 TABLET, FILM COATED ORAL at 09:49

## 2022-08-18 RX ADMIN — CYANOCOBALAMIN TAB 1000 MCG 1000 MCG: 1000 TAB at 09:49

## 2022-08-18 RX ADMIN — SERTRALINE 100 MG: 50 TABLET, FILM COATED ORAL at 09:47

## 2022-08-18 RX ADMIN — SODIUM CHLORIDE, PRESERVATIVE FREE 10 ML: 5 INJECTION INTRAVENOUS at 09:53

## 2022-08-18 RX ADMIN — SODIUM CHLORIDE, PRESERVATIVE FREE 10 ML: 5 INJECTION INTRAVENOUS at 21:04

## 2022-08-18 RX ADMIN — Medication 400 MG: at 21:03

## 2022-08-18 RX ADMIN — Medication 400 MG: at 14:34

## 2022-08-18 RX ADMIN — HYDROXYZINE PAMOATE 25 MG: 25 CAPSULE ORAL at 06:06

## 2022-08-18 RX ADMIN — KIT FOR THE PREPARATION OF TECHNETIUM TC99M SESTAMIBI 30 MILLICURIE: 1 INJECTION, POWDER, LYOPHILIZED, FOR SOLUTION PARENTERAL at 11:35

## 2022-08-18 RX ADMIN — REGADENOSON 0.4 MG: 0.08 INJECTION, SOLUTION INTRAVENOUS at 11:38

## 2022-08-18 RX ADMIN — TAMSULOSIN HYDROCHLORIDE 0.4 MG: 0.4 CAPSULE ORAL at 09:48

## 2022-08-18 RX ADMIN — SODIUM CHLORIDE, PRESERVATIVE FREE 10 ML: 5 INJECTION INTRAVENOUS at 04:45

## 2022-08-18 RX ADMIN — HYDRALAZINE HYDROCHLORIDE 10 MG: 20 INJECTION, SOLUTION INTRAMUSCULAR; INTRAVENOUS at 04:38

## 2022-08-18 ASSESSMENT — PAIN SCALES - GENERAL
PAINLEVEL_OUTOF10: 0
PAINLEVEL_OUTOF10: 0

## 2022-08-18 ASSESSMENT — ENCOUNTER SYMPTOMS: CONSTIPATION: 1

## 2022-08-18 NOTE — PROGRESS NOTES
V2.0  [facility] Hospitalist Progress Note      Name:  Ina Bumpers /Age/Sex: 1935  (81 y.o. male)   MRN & CSN:  [id number] & [id number] Encounter Date/Time: 2022 8:13 AM EDT    Location:  [facility]-A PCP: Jennifer Samuels MD       Hospital Day: 3    Assessment and Plan:   Ina Bumpers is a 81 y.o. male with pmh of CAD, HTN, right kidney cyst, GERD, CAD, thyroid disease who presents with constipation, lightheadedness, palpitations, weight loss, insomnia, and subsequently found to have Acute anemia      Plan:    Generalized weakness with chronic debility setting of acute anemia  -- CBC, BMP in AM  --PT/OT  -- Would benefit from acute rehab upon discharge. --No reports of bleeding; HDS  Night sweats/ weight loss-patient on 30 pound weight loss  --Endocrinology /Hemotology following    Palpitations  Orthostatic hypotension  -- Cardiology following  --Echo shows EF 50-55% and stress test per cardiology completed, report pending,   --encourage compression stockings    Constipation: Presented with constipation; reports 4 days. Tried Dulcolax without relief. May need soapsuds enema if stool softeners do not work  -- Still able to eat and drink, no vomiting or nausea. Denies any urinary symptoms. -- stool softeners as ordered  CT scan of the abdominal pelvis showed no acute intra-abdominal processes. Advance diet as tolerated. --MIVF     Dental infection:   --On clindamycin for dental infection since . History of pancytopenia: Extensive workup done by hematology/ oncology at [address] [facility]   --bone marrow biopsy -. Benign essential hypertension: Home medications include Hyzaar. 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   Estimated baseline creatinine of 1.7. 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  --Hem/Nephro following     Coronary artery disease s/p stent placement: stable,   -- Home regimen included aspirin and Plavix previously. Antiplatelets contraindicated secondary to frequency of GI bleed     Elevated BNP:   -- Troponin -neg; Echo shows EF 50-55% on left ventricle systolic function; EKG sinus bradycardia, no ischemic changes    Chronic Conditions    BPH on Flomax in the hospital, resume home meds on discharge  Hypothyroidism: On levothyroxine; TSH 0.015; T3 2.1; T4 1.25  Insomnia on trazodone  Depression on sertraline  Punctate nonobstructing bilateral renal stones- monitor outpatient  Colonic diverticulosis- Monitor  Severe atherosclerotic disease involving aorta- continue statin,zetia,BB  GI bleed-monitor hemoglobin; continue Protonix    Diet ADULT DIET; Regular  ADULT ORAL NUTRITION SUPPLEMENT; Breakfast, Dinner; Standard High Calorie/High Protein Oral Supplement   DVT Prophylaxis [] Lovenox, [x]  Heparin, [] SCDs, [] Ambulation,  [] Eliquis, [] Xarelto  [] Coumadin   Code Status Full Code   Disposition From: Home  Expected Disposition: ARU/SNF? ? Estimated Date of Discharge: 2 - 3 days  Patient requires continued admission due to ongoing neuro, psych, cardio, onc, and endocrinology work up    Surrogate Decision Maker/ POA self     Subjective:     Chief Complaint: No chief complaint on file. Patient seen and examined at bedside, patient tells me that he has not had a bowel movement in approximately 4 days. All sounds are active in all 4 quadrants. Patient denies loss of appetite, however does tell me that he does not cook a lot for himself at home. Review of Systems:    Review of Systems   Constitutional:  Positive for appetite change. Gastrointestinal:  Positive for constipation. All other systems reviewed and are negative. Objective:      Intake/Output Summary (Last 24 hours) at 8/18/2022 1239  Last data filed at 8/18/2022 0907  Gross per 24 hour Intake --   Output 1075 ml   Net -1075 ml        Vitals:   Vitals:    08/18/22 0901   BP: (!) 150/75   Pulse: 57   Resp: 17   Temp: 98.7 °F (37.1 °C)   SpO2: 97%       Physical Exam:     General: NAD  Eyes: EOMI  ENT: neck supple  Cardiovascular: Regular rate. Respiratory: Clear to auscultation  Gastrointestinal: Soft, non tender, BS x4  Genitourinary: no suprapubic tenderness  Musculoskeletal: No edema  Skin: warm, dry  Neuro: Alert. Psych: Mood appropriate.      Medications:   Medications:    tamsulosin  0.4 mg Oral Daily    [Held by provider] levothyroxine  75 mcg Oral Daily    atorvastatin  10 mg Oral Daily    sodium chloride flush  5-40 mL IntraVENous 2 times per day    pantoprazole  40 mg Oral QAM AC    polyethylene glycol  17 g Oral Daily    sertraline  100 mg Oral Daily    QUEtiapine  12.5 mg Oral Nightly    heparin (porcine)  5,000 Units SubCUTAneous 3 times per day    vitamin B-12  1,000 mcg Oral Daily    ezetimibe  10 mg Oral Daily    metoprolol succinate  100 mg Oral Daily      Infusions:    sodium chloride       PRN Meds: sodium chloride flush, 5-40 mL, PRN  sodium chloride, , PRN  ondansetron, 4 mg, Q8H PRN   Or  ondansetron, 4 mg, Q6H PRN  acetaminophen, 650 mg, Q6H PRN   Or  acetaminophen, 650 mg, Q6H PRN  sennosides, 5 mL, BID PRN  hydrOXYzine pamoate, 25 mg, Daily PRN  hydrALAZINE, 10 mg, Q6H PRN      Labs      Recent Results (from the past 24 hour(s))   Basic Metabolic Panel    Collection Time: 08/18/22  2:54 AM   Result Value Ref Range    Sodium 138 135 - 145 MMOL/L    Potassium 4.1 3.5 - 5.1 MMOL/L    Chloride 106 99 - 110 mMol/L    CO2 24 21 - 32 MMOL/L    Anion Gap 8 4 - 16    BUN 27 (H) 6 - 23 MG/DL    Creatinine 2.3 (H) 0.9 - 1.3 MG/DL    Glucose 109 (H) 70 - 99 MG/DL    Calcium 9.0 8.3 - 10.6 MG/DL    GFR Non- 27 (L) >60 mL/min/1.73m2    GFR  33 (L) >60 mL/min/1.73m2   CBC    Collection Time: 08/18/22  2:54 AM   Result Value Ref Range    WBC 6.6 4.0 - 10.5 K/CU MM    RBC 3.06 (L) 4.6 - 6.2 M/CU MM    Hemoglobin 8.9 (L) 13.5 - 18.0 GM/DL    Hematocrit 26.7 (L) 42 - 52 %    MCV 87.3 78 - 100 FL    MCH 29.1 27 - 31 PG    MCHC 33.3 32.0 - 36.0 %    RDW 14.4 11.7 - 14.9 %    Platelets 155 (L) 740 - 440 K/CU MM    MPV 10.9 7.5 - 11.1 FL   Magnesium    Collection Time: 08/18/22  2:54 AM   Result Value Ref Range    Magnesium 1.7 (L) 1.8 - 2.4 mg/dl   Phosphorus    Collection Time: 08/18/22  2:54 AM   Result Value Ref Range    Phosphorus 3.1 2.5 - 4.9 MG/DL   Iron and TIBC    Collection Time: 08/18/22  2:54 AM   Result Value Ref Range    Iron 49 (L) 59 - 158 ug/dL    UIBC 169 110 - 370 ug/dL    TIBC 218 (L) 250 - 450 ug/dL    Transferrin % 22 10 - 44 %   Ferritin    Collection Time: 08/18/22  2:54 AM   Result Value Ref Range    Ferritin 313 30 - 400 NG/ML   Vitamin B12 & Folate    Collection Time: 08/18/22  2:54 AM   Result Value Ref Range    Vitamin B-12 >2000 (H) 211 - 911 pg/ml    Folate 7.9 3.1 - 17.5 NG/ML   Hemoglobin A1C    Collection Time: 08/18/22  2:54 AM   Result Value Ref Range    Hemoglobin A1C 5.2 4.2 - 6.3 %    eAG 103 mg/dL   Urinalysis with Microscopic    Collection Time: 08/18/22  4:10 AM   Result Value Ref Range    Color, UA YELLOW YELLOW    Clarity, UA CLEAR CLEAR    Glucose, Urine NEGATIVE NEGATIVE MG/DL    Bilirubin Urine NEGATIVE NEGATIVE MG/DL    Ketones, Urine NEGATIVE NEGATIVE MG/DL    Specific Gravity, UA 1.010 1.001 - 1.035    Blood, Urine SMALL (A) NEGATIVE    pH, Urine 7.0 5.0 - 8.0    Protein, UA TRACE (A) NEGATIVE MG/DL    Urobilinogen, Urine 0.2 0.2 - 1.0 MG/DL    Nitrite Urine, Quantitative NEGATIVE NEGATIVE    Leukocyte Esterase, Urine NEGATIVE NEGATIVE    RBC, UA 1 0 - 3 /HPF    WBC, UA <1 0 - 2 /HPF    Bacteria, UA NEGATIVE NEGATIVE /HPF    Squam Epithel, UA <1 /HPF    Trichomonas, UA NONE SEEN NONE SEEN /HPF   Sodium, urine, random    Collection Time: 08/18/22  4:10 AM   Result Value Ref Range    Sodium, Ur 105 35 - 167 diastolic function. Left Atrium  Essentially normal left atrium. Right Atrium  Essentially normal right atrium. Right Ventricle  Essentially normal right ventricle. Aortic Valve  Structurally normal aortic valve. Mitral Valve  Structurally normal mitral valve. Tricuspid Valve  Mild tricuspid regurgitation; RVSP: 31 mmHg. Pulmonic Valve  Pulmonic valve is structurally normal.   Pericardial Effusion  No evidence of any pericardial effusion. Pleural Effusion  No evidence of pleural effusion. Miscellaneous  IVC and abdominal aorta are within normal limits.   M-Mode/2D Measurements & Calculations   LV Diastolic Dimension:  LV Systolic Dimension:  LA Dimension: 2.6 cmAO Root  4.93 cm                  3.56 cm                 Dimension: 3.6 cmLA Area:  LV FS:27.8 %             LV Volume Diastolic: 49 10 cm2  LV PW Diastolic: 2.84 cm ml  LV PW Systolic: 6.18 cm  LV Volume Systolic: 23  Septum Diastolic: 0.39   ml  cm                       LV EDV/LV EDV Index: 49  Septum Systolic: 4.44 cm VS/17 X2MX ESV/LV ESV   LA/Aorta: 0.72  CO: 5.82 l/min           Index: 23 ml/12 m2  CI: 3.11 l/m*m2          EF Calculated (A4C):    LA volume/Index: 17 ml /9m2                           53.1 %  LV Area Diastolic: 85.2  EF Calculated (2D):  cm2                      53.5 %  LV Area Systolic: 05.9  cm2                      LV Length: 7.42 cm                            LVOT: 2.2 cm  Doppler Measurements & Calculations   MV Peak E-Wave: 52.8  AV Peak Velocity: 126 cm/s   LVOT Peak Velocity: 136  cm/s                  AV Peak Gradient: 6.35 mmHg  cm/s  MV Peak A-Wave: 82.4  AV Mean Velocity: 90.1 cm/s  LVOT Mean Velocity: 88.9  cm/s                  AV Mean Gradient: 4 mmHg     cm/s  MV E/A Ratio: 0.64    AV VTI: 26 cm                LVOT Peak Gradient: 7  MV Peak Gradient:     AV Area (Continuity):3.61    mmHgLVOT Mean Gradient: 4  1.12 mmHg             cm2                          mmHg

## 2022-08-18 NOTE — PROGRESS NOTES
Occupational Therapy    Occupational Therapy Treatment Note    Name: Victorino Silva MRN: [id number] :   1935   Date:  2022   Admission Date: 2022 Room:  [address] [place] [address]-A     Primary Problem: There were no encounter diagnoses. Restrictions/Precautions:  General Precautions, Fall Risk    Communication with other providers: RN approved session. Subjective:  Patient states: \"My shaking is better. \"   Pain:   Location, Type, Intensity (0/10 to 10/10):  reports feeling gaseous, denies pain     Objective:    Observation: Pt presented supine in bed, agreeable to session. Objective Measures: Tele, BP seated 168/75 (99), standing 128/90 (99)    Treatment, including education:  Therapeutic Activity Training:   Therapeutic activity training was instructed today. Cues were given for safety, sequence, UE/LE placement, awareness, and balance. Activities performed today included bed mobility training, sup-sit, sit-stand, SPT. Supine to Sit with SBA. SBA to scoot hips forward. Pt sat EOB for ~7 min with SBA, denies dizziness. Sit to stand transfer from EOB X2 trials and recliner X2 trials with CGA and use of fWW with mod v/c's for UE placement. Functional mobility for ~10' with use of FWW with CGA-Min A.     SPT from recliner <> EOB with use of bedrail with CGA. Sit to Supine with SBA and SBA to scoot self up in bed. Self Care Training:   Cues were given for safety, sequence, UE/LE placement, visual cues, and balance. Activities performed today included hygiene. Pt stood at sink for hygiene task of brushing teeth and washing face/hands with wash cloth for X2 trials with CGA for standing balance with seated RB after each trial for ~1 min each. Pt reports dizziness with standing, reports it subsided after seated RB. Patient educated on role of OT , benefits of OT and rationale for therapeutic intervention. Educated on need to be patient advocate, benefit of EOB activity.

## 2022-08-18 NOTE — CARE COORDINATION
CM is following and notes ARU referral. Patient is aware of referral and is in agreement with that plan.

## 2022-08-18 NOTE — PLAN OF CARE
Problem: Discharge Planning  Goal: Discharge to home or other facility with appropriate resources  Outcome: Progressing     Problem: Pain  Goal: Verbalizes/displays adequate comfort level or baseline comfort level  Outcome: Progressing     Problem: ABCDS Injury Assessment  Goal: Absence of physical injury  Outcome: Progressing     Problem: Safety - Adult  Goal: Free from fall injury  Outcome: Progressing     Problem: Nutrition Deficit:  Goal: Optimize nutritional status  Outcome: Progressing

## 2022-08-18 NOTE — CONSULTS
Nephrology Service Consultation      [address], [address] [address]  Phone: [phone]  Office Hours: 8:30AM - 4:30PM  Monday - Friday              MDM//Assessment and Recommendations   OLIVER on CKD4  Deconditioning  Hyperthyroidism  Tremors/dizziness/    Plan;  Cr stable, will continue to monitor  Monitor BP and adjust meds as needed, keep SBP around 140-150s  Avoid nephrotoxins  Will follow    Thank you    Patient Active Problem List    Diagnosis Date Noted    Tremors of nervous system 08/18/2022    Peripheral polyneuropathy 08/18/2022    Balance disorder 08/18/2022    Generalized weakness 08/17/2022    HA (generalized anxiety disorder) 08/17/2022    MDD (major depressive disorder), recurrent episode, moderate ([facility] [address] 08/17/2022    Heart palpitations 08/17/2022    Acute anemia 08/16/2022    History of anemia due to CKD 09/14/2021    Stage 3b chronic kidney disease ([facility] [address] 10/29/2020    Persistent proteinuria 10/29/2020    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 male with CKD4 presented due to generalized weakness, constipation. Renal consult for cr 2.4 and office pt of Dr Draby Garcia  There is concerns for weight loss and he was found to have a TSH level of 0.015.   He has had some dizziness and generalized weakness, tremors      REVIEW OF SYSTEMS:  Can not obtain due to drowsiness    Past Medical History:        Diagnosis Date    Arthritis     CAD (coronary artery disease)     GERD (gastroesophageal reflux disease)     Hyperlipidemia     Hypertension     Kidney cysts     Right kidney    Thyroid disease     Unspecified cerebral artery occlusion with cerebral infarction Past Surgical History:        Procedure Laterality Date    CATARACT REMOVAL  2021    COLONOSCOPY  8/12/13    divertics    COLONOSCOPY N/A 2/8/2020    COLONOSCOPY DIAGNOSTIC performed by Kevin Bowers MD at [address] [facility], COLON, DIAGNOSTIC  8/12/13    egd: normal    FRACTURE SURGERY      PROSTATE BIOPSY      UPPER GASTROINTESTINAL ENDOSCOPY N/A 2/6/2020    EGD DIAGNOSTIC ONLY performed by Kevin Bowers MD at [facility] ENDOSCOPY       Medications:   Prior to Admission medications    Medication Sig Start Date End Date Taking? Authorizing Provider   polyethylene glycol (MIRALAX) 17 g packet Take 17 g by mouth daily as needed for Constipation 8/13/22 8/18/22  Gail Marie,    levothyroxine (SYNTHROID) 75 MCG tablet  5/4/22   Historical Provider, MD   omeprazole (PRILOSEC) 40 MG delayed release capsule  3/21/22   Historical Provider, MD   losartan-hydroCHLOROthiazide (HYZAAR) 100-25 MG per tablet Take 1 tablet by mouth daily 4/26/22   Jodi Collier MD   alfuzosin (UROXATRAL) 10 MG extended release tablet Take 10 mg by mouth daily    Historical Provider, MD   potassium chloride (KLOR-CON) 10 MEQ extended release tablet Take 20 mEq by mouth daily    Historical Provider, MD   traZODone (DESYREL) 50 MG tablet TAKE 2 TABLETS BY MOUTH AT BEDTIME 8/25/20   Historical Provider, MD   doxazosin (CARDURA) 4 MG tablet 8 mg  10/1/19   Historical Provider, MD   nebivolol (BYSTOLIC) 10 MG tablet Take by mouth daily    Historical Provider, MD   vitamin B-12 (CYANOCOBALAMIN) 1000 MCG tablet Take 1,000 mcg by mouth daily    Historical Provider, MD   simvastatin (ZOCOR) 10 MG tablet Take 10 mg by mouth nightly    Historical Provider, MD   ezetimibe (ZETIA) 10 MG tablet Take 10 mg by mouth daily. Historical Provider, MD   esomeprazole Magnesium (NEXIUM) 40 MG PACK Take 40 mg by mouth daily.     Historical Provider, MD   clorazepate (TRANXENE) 7.5 MG tablet Take 7.5 mg by mouth 2

## 2022-08-18 NOTE — PROGRESS NOTES
[facility] [place]  [address] [facility],[address]  Phone: [phone]    Fax [phone]                  Aurora Yeager MD, Amberly Orellana MD, [address] Mejia MD, MD Shikha Miller MD Neale Horde, MD Malinda Evens, MD Twanna Dolphin, EVERTON Qiu, EVERTON Barraza, EVERTON Felix, APRN Garrel Litten, NICOLAS     Cardiology Progress Note     Today's Plan: check orthostatic blood pressures    Admit Date:  8/16/2022    Consult reason/ Seen today for: palpations   CARDIOLOGY ATTENDING ADDENDUM    MEDICAL DECISION MAKING;    palpitations weakness with high blood pressure at baseline EKG shows normal sinus rhythm for now continue Toprol- mg daily  Stress test shows no ischemia  TSH is quite low is being evaluated by endocrinology  Echo shows preserved EF   outpatient 30-day monitor and monitor him on telemetry while here in the hospital  With compression stockings and concerns about him using Flomax and being orthostatic allow blood pressure to be in 504Q systolic  HTN: stable, continue present medications and add losartan only if blood pressures more than 150s or 160s consistently  Agree with hematology, endocrinology work-up due to night sweats and weight loss  DVT prophylaxis if no contraindication  6. Dyslipidemia: continue statins  We will see as needed basis call with questions          HPI:  I have reviewed the HPI  And agree with above   Jayne Salazar is a 81 y. o.year old who and presents with had no chief complaint listed for this encounter. No chief complaint on file.     Please review addendum/changes made to note above   Interval history: History did not show any ischemia he is feeling somewhat better    Physical Exam:  General:  Awake, alert, NAD  Head:normal  Eye:normal  Neck:  No JVD   Chest:  Clear to auscultation, respiration easy  Cardiovascular:  S1 and S2 audible, No added heart sounds, No signs of ankle edema, or volume overload, No evidence of JVD, No crackles   Abdomen:   nontender  Extremities:  no edema  Pulses; palpable  Neuro: grossly normal        I agree with the plan, which was planned by myself and discussed with advanced level provider. My documented MDM is a substantive portion of the supervisory note. I have seen ,spoken to  and examined this patient personally, independently of the advanced level provider. I have spent substantiate  portion of this encounter independently myself in examining patient and developing the medical management plan . I have reviewed the hospital care given to date and reviewed all pertinent labs and imaging. The plan was developed mutually at the time of the visit with the patient,  NP /PA  and myself. I have spoken with patient, nursing staff and provided written and verbal instructions . The above note has been reviewed and I agree with the assessment, diagnosis, and treatment plan with changes made by me as follows . Roberta Morales MD [facility] 08/18/22     Subjective and  Overnight Events: Patient reports that he has been feeling better today. He continues to feel puny and is planned for stress test today. Assessment and Plan:  Palpitations: Continue to monitor while he is on telemetry. We will check  30-day event monitor as an outpatient. Low TSH with Synthroid on hold-could be because of palpitations. Magnesium is low today. Replace cardiogram shows normal ejection fraction without significant valvular dysfunction. Stress test is pending  Possible orthostatic hypotension: Continue compression stockings and recheck orthostatic blood pressures. Consider changing from Flomax to Proscar. Telemetry Reviewed:   Sinus rhythm    ECHO :   echocardiogram      History of Presenting Illness:    Chief complain on admission : 81 y. o.year old who is admitted forNo chief complaint on file.        Past medical history:    has a past medical history of Arthritis, CAD (coronary artery disease), GERD (gastroesophageal reflux disease), Hyperlipidemia, Hypertension, Kidney cysts, Thyroid disease, and Unspecified cerebral artery occlusion with cerebral infarction. Past surgical history:   has a past surgical history that includes fracture surgery; Prostate biopsy; Coronary angioplasty with stent; Colonoscopy (8/12/13); Endoscopy, colon, diagnostic (8/12/13); Upper gastrointestinal endoscopy (N/A, 2/6/2020); Colonoscopy (N/A, 2/8/2020); and Cataract removal (2021). Social History:   reports that he has never smoked. He has never used smokeless tobacco. He reports that he does not drink alcohol and does not use drugs. Family history:  family history includes Alzheimer's Disease in his father; Bleeding Prob in his mother; Depression in his sister; Heart Failure in his mother; High Blood Pressure in his daughter; Kidney Disease in his mother; Osteoarthritis in his sister. Allergies   Allergen Reactions    Erythromycin     Pcn [Penicillins]        Review of Systems   All 14 systems were reviewed and are negative  Except for the positive findings  which as documented     BP (!) 150/75   Pulse 57   Temp 98.7 °F (37.1 °C) (Oral)   Resp 17   Ht 5' 11\" (1.803 m)   Wt 142 lb 3.2 oz (64.5 kg)   SpO2 97%   BMI 19.83 kg/m²     Intake/Output Summary (Last 24 hours) at 8/18/2022 1355  Last data filed at 8/18/2022 0907  Gross per 24 hour   Intake --   Output 1075 ml   Net -1075 ml       Physical Exam  Vitals reviewed. Constitutional:       General: He is not in acute distress. Appearance: Normal appearance. He is not ill-appearing. HENT:      Head: Atraumatic. Neck:      Vascular: No carotid bruit. Cardiovascular:      Rate and Rhythm: Normal rate and regular rhythm. Pulses: Normal pulses. Heart sounds: Normal heart sounds. No murmur heard. Pulmonary:      Effort: Pulmonary effort is normal. No respiratory distress.       Breath sounds: Normal breath

## 2022-08-18 NOTE — CONSULTS
ONCOLOGY HEMATOLOGY CARE ([facility])  CONSULTATION REPORT    REASON FOR CONSULT    Pancytopenia, weight loss, night sweats    CHIEF COMPLAINT    No chief complaint on file. HISTORY OF PRESENT ILLNESS   Caron Grossman is a 81 y.o. male with past medical history significant for Arthritis, CAD coronary artery disease, GERD, hyperlipidemia, hypertension, right kidney cyst, thyroid disease, CVA, who presents to the ED due to constipation unresponsive to use of Dulcolax and MiraLAX. Impression   1. No acute intraabdominal process identified. 2. Colonic diverticulosis without CT evidence of diverticulitis. 3. Punctate nonobstructing bilateral renal stones. No obstructive uropathy   identified. 4. No bowel obstruction. 5. Severe atherosclerotic disease. He is noted to have Hgb 8.0, platelet count 077. This is consistent with labs dating back to 2020. Latest Reference Range & Units 8/18/22 02:54   Ferritin 30 - 400 NG/   Iron 59 - 158 ug/dL 49 (L)   UIBC 110 - 370 ug/dL 169   TIBC 250 - 450 ug/dL 218 (L)   Transferrin % 10 - 44 % 22   FOLATE, FOLAT 3.1 - 17.5 NG/ML 7.9   Vitamin B-12 211 - 911 pg/ml >2000 (H)   (L): Data is abnormally low  (H): Data is abnormally high    He has had workup per Dr. Yovani Garrett having had a bone marrow biopsy 11/21. 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 He notes unintentional weight loss of 29 pounds in past two months. Reports appetite is poor, but lives alone and cooks for self. Reviewed 5/22 SPEP, [place] Lambda light chains, beta 2 microglobulin. He has known lung nodule for which he is receiving follow up.     PAST MEDICAL HISTORY    Past Medical History:   Diagnosis Date    Arthritis     CAD (coronary artery disease)     GERD (gastroesophageal reflux disease)     Hyperlipidemia     Hypertension     Kidney cysts     Right kidney    Thyroid disease Unspecified cerebral artery occlusion with cerebral infarction        SURGICAL HISTORY    Past Surgical History:   Procedure Laterality Date    CATARACT REMOVAL  2021    COLONOSCOPY  8/12/13    divertics    COLONOSCOPY N/A 2/8/2020    COLONOSCOPY DIAGNOSTIC performed by Dena Zuniga MD at [address] [place] [address], [place], DIAGNOSTIC  8/12/13    egd: normal    FRACTURE SURGERY      PROSTATE BIOPSY      UPPER GASTROINTESTINAL ENDOSCOPY N/A 2/6/2020    EGD DIAGNOSTIC ONLY performed by Dena Zuniga MD at [facility]    Family History   Problem Relation Age of Onset    Heart Failure Mother     Kidney Disease Mother     Bleeding Prob Mother     Alzheimer's Disease Father     Depression Sister     Osteoarthritis Sister     High Blood Pressure Daughter        SOCIAL HISTORY    Social History     Socioeconomic History    Marital status:    Tobacco Use    Smoking status: Never    Smokeless tobacco: Never   Substance and Sexual Activity    Alcohol use: No    Drug use: No       REVIEW OF SYSTEMS    Constitutional:  Denies fever, chills, loss of appetite, +weight loss, tiredness, + fatigue or + weakness   HEENT:  Denies swelling of neck glands  Respiratory:  Denies cough, shortness of breath or hemoptysis  Cardiovascular:  Denies chest pain, palpitations or swelling   GI:  Denies abdominal pain, nausea, vomiting, +constipation or diarrhea   Musculoskeletal:  Denies back pain   Skin:  Denies rash   Neurologic:  Denies headache, focal weakness or sensory changes   All systems negative except as marked.      PHYSICAL EXAM    Vitals: BP (!) 150/75   Pulse 57   Temp 98.7 °F (37.1 °C) (Oral)   Resp 17   Ht 5' 11\" (1.803 m)   Wt 142 lb 3.2 oz (64.5 kg)   SpO2 97%   BMI 19.83 kg/m²   CONSTITUTIONAL: awake, alert, cooperative, no apparent distress, thin  EYES: sclera clear and conjunctiva normal  ENT: Normocephalic, without obvious abnormality, atraumatic  NECK: supple, symmetrical  HEMATOLOGIC/LYMPHATIC: no cervical, supraclavicular or axillary lymphadenopathy   LUNGS: no increased work of breathing and clear to auscultation   CARDIOVASCULAR: regular rate and rhythm, normal S1 and S2, no murmur noted  ABDOMEN: normal bowel sounds x 4, soft, non-distended, non-tender, no masses palpated, no hepatosplenomgaly   MUSCULOSKELETAL: full range of motion noted, tone is normal  NEUROLOGIC: awake, alert, oriented to name, place and time. Motor skills grossly intact. SKIN: Normal skin color, texture, turgor and no jaundice. Skin appears intact   EXTREMITIES: no LE edema  LABORATORY RESULTS  CBC:   Recent Labs     08/17/22 0027 08/18/22  0254   WBC 5.9 6.6   HGB 9.6* 8.9*   * 124*     BMP:    Recent Labs     08/17/22 0027 08/18/22  0254    138   K 4.3 4.1    106   CO2 20* 24   BUN 31* 27*   CREATININE 2.4* 2.3*   GLUCOSE 92 109*     ASSESSMENT/RECOMMENDATION    Anemia- multifactorial, chronic disease, nutritional status. Iron studies as above. BMB 2021 as above. Defer repeat BMB at this time. Thrombocytopenia - chronic, 124 today. To continue to monitor CBC daily, no signs of bleeding. Unintentional weight loss- CT abdomen/pelvis as above, plan for CT chest for further evaluation. Has history of lung nodule. TSH noted to be 0.015. CKD- nephrology following. I have independently evaluated and examined this patient today. I have reviewed radiologic and biochemical tests on this patient. Management Plan is developed mutually with Peggy Baker NP. I have reviewed above note and agree with assessment and plan    We will follow the patient. Thank you for allowing me to participate in the care of this very pleasant patient.

## 2022-08-19 PROBLEM — E44.0 MODERATE MALNUTRITION (HCC): Status: ACTIVE | Noted: 2022-08-19

## 2022-08-19 LAB
ANION GAP SERPL CALCULATED.3IONS-SCNC: 12 MMOL/L (ref 4–16)
BUN BLDV-MCNC: 31 MG/DL (ref 6–23)
CALCIUM SERPL-MCNC: 9 MG/DL (ref 8.3–10.6)
CHLORIDE BLD-SCNC: 103 MMOL/L (ref 99–110)
CO2: 23 MMOL/L (ref 21–32)
CREAT SERPL-MCNC: 2.2 MG/DL (ref 0.9–1.3)
GFR AFRICAN AMERICAN: 35 ML/MIN/1.73M2
GFR NON-AFRICAN AMERICAN: 29 ML/MIN/1.73M2
GLUCOSE BLD-MCNC: 101 MG/DL (ref 70–99)
GLUCOSE BLD-MCNC: 107 MG/DL (ref 70–99)
HCT VFR BLD CALC: 28.5 % (ref 42–52)
HEMOGLOBIN: 9.4 GM/DL (ref 13.5–18)
MAGNESIUM: 1.8 MG/DL (ref 1.8–2.4)
MCH RBC QN AUTO: 28.7 PG (ref 27–31)
MCHC RBC AUTO-ENTMCNC: 33 % (ref 32–36)
MCV RBC AUTO: 86.9 FL (ref 78–100)
PDW BLD-RTO: 14.4 % (ref 11.7–14.9)
PHOSPHORUS: 3 MG/DL (ref 2.5–4.9)
PLATELET # BLD: 129 K/CU MM (ref 140–440)
PMV BLD AUTO: 9.2 FL (ref 7.5–11.1)
POTASSIUM SERPL-SCNC: 4.2 MMOL/L (ref 3.5–5.1)
RBC # BLD: 3.28 M/CU MM (ref 4.6–6.2)
SODIUM BLD-SCNC: 138 MMOL/L (ref 135–145)
WBC # BLD: 6.9 K/CU MM (ref 4–10.5)

## 2022-08-19 PROCEDURE — 6370000000 HC RX 637 (ALT 250 FOR IP): Performed by: STUDENT IN AN ORGANIZED HEALTH CARE EDUCATION/TRAINING PROGRAM

## 2022-08-19 PROCEDURE — 97535 SELF CARE MNGMENT TRAINING: CPT

## 2022-08-19 PROCEDURE — 6370000000 HC RX 637 (ALT 250 FOR IP): Performed by: HOSPITALIST

## 2022-08-19 PROCEDURE — 2580000003 HC RX 258: Performed by: STUDENT IN AN ORGANIZED HEALTH CARE EDUCATION/TRAINING PROGRAM

## 2022-08-19 PROCEDURE — 99232 SBSQ HOSP IP/OBS MODERATE 35: CPT | Performed by: INTERNAL MEDICINE

## 2022-08-19 PROCEDURE — 6370000000 HC RX 637 (ALT 250 FOR IP): Performed by: INTERNAL MEDICINE

## 2022-08-19 PROCEDURE — 83735 ASSAY OF MAGNESIUM: CPT

## 2022-08-19 PROCEDURE — 6370000000 HC RX 637 (ALT 250 FOR IP): Performed by: NURSE PRACTITIONER

## 2022-08-19 PROCEDURE — 85027 COMPLETE CBC AUTOMATED: CPT

## 2022-08-19 PROCEDURE — 94761 N-INVAS EAR/PLS OXIMETRY MLT: CPT

## 2022-08-19 PROCEDURE — 6360000002 HC RX W HCPCS: Performed by: INTERNAL MEDICINE

## 2022-08-19 PROCEDURE — 80048 BASIC METABOLIC PNL TOTAL CA: CPT

## 2022-08-19 PROCEDURE — 84100 ASSAY OF PHOSPHORUS: CPT

## 2022-08-19 PROCEDURE — 36415 COLL VENOUS BLD VENIPUNCTURE: CPT

## 2022-08-19 PROCEDURE — 1200000000 HC SEMI PRIVATE

## 2022-08-19 PROCEDURE — 82962 GLUCOSE BLOOD TEST: CPT

## 2022-08-19 RX ORDER — SENNA AND DOCUSATE SODIUM 50; 8.6 MG/1; MG/1
2 TABLET, FILM COATED ORAL DAILY
Status: COMPLETED | OUTPATIENT
Start: 2022-08-19 | End: 2022-08-21

## 2022-08-19 RX ORDER — LIDOCAINE 4 G/G
1 PATCH TOPICAL ONCE
Status: COMPLETED | OUTPATIENT
Start: 2022-08-19 | End: 2022-08-20

## 2022-08-19 RX ORDER — PREGABALIN 25 MG/1
25 CAPSULE ORAL ONCE
Status: COMPLETED | OUTPATIENT
Start: 2022-08-19 | End: 2022-08-19

## 2022-08-19 RX ADMIN — SODIUM CHLORIDE, PRESERVATIVE FREE 10 ML: 5 INJECTION INTRAVENOUS at 09:49

## 2022-08-19 RX ADMIN — QUETIAPINE FUMARATE 12.5 MG: 25 TABLET ORAL at 22:15

## 2022-08-19 RX ADMIN — ATORVASTATIN CALCIUM 10 MG: 10 TABLET, FILM COATED ORAL at 09:48

## 2022-08-19 RX ADMIN — EZETIMIBE 10 MG: 10 TABLET ORAL at 09:48

## 2022-08-19 RX ADMIN — SERTRALINE 100 MG: 50 TABLET, FILM COATED ORAL at 09:48

## 2022-08-19 RX ADMIN — CYANOCOBALAMIN TAB 1000 MCG 1000 MCG: 1000 TAB at 09:48

## 2022-08-19 RX ADMIN — HYDROXYZINE PAMOATE 25 MG: 25 CAPSULE ORAL at 03:25

## 2022-08-19 RX ADMIN — HEPARIN SODIUM 5000 UNITS: 5000 INJECTION INTRAVENOUS; SUBCUTANEOUS at 05:34

## 2022-08-19 RX ADMIN — SODIUM CHLORIDE, PRESERVATIVE FREE 10 ML: 5 INJECTION INTRAVENOUS at 22:15

## 2022-08-19 RX ADMIN — METOPROLOL SUCCINATE 100 MG: 50 TABLET, EXTENDED RELEASE ORAL at 09:48

## 2022-08-19 RX ADMIN — PANTOPRAZOLE SODIUM 40 MG: 40 TABLET, DELAYED RELEASE ORAL at 05:34

## 2022-08-19 RX ADMIN — ACETAMINOPHEN 650 MG: 325 TABLET ORAL at 09:55

## 2022-08-19 RX ADMIN — HEPARIN SODIUM 5000 UNITS: 5000 INJECTION INTRAVENOUS; SUBCUTANEOUS at 13:35

## 2022-08-19 RX ADMIN — POLYETHYLENE GLYCOL (3350) 17 G: 17 POWDER, FOR SOLUTION ORAL at 09:48

## 2022-08-19 RX ADMIN — SENNOSIDES AND DOCUSATE SODIUM 2 TABLET: 50; 8.6 TABLET ORAL at 09:48

## 2022-08-19 RX ADMIN — PREGABALIN 25 MG: 25 CAPSULE ORAL at 22:32

## 2022-08-19 RX ADMIN — TAMSULOSIN HYDROCHLORIDE 0.4 MG: 0.4 CAPSULE ORAL at 09:48

## 2022-08-19 ASSESSMENT — PAIN DESCRIPTION - ORIENTATION: ORIENTATION: LEFT;MID;LOWER

## 2022-08-19 ASSESSMENT — PAIN DESCRIPTION - ONSET: ONSET: ON-GOING

## 2022-08-19 ASSESSMENT — PAIN - FUNCTIONAL ASSESSMENT: PAIN_FUNCTIONAL_ASSESSMENT: ACTIVITIES ARE NOT PREVENTED

## 2022-08-19 ASSESSMENT — PAIN SCALES - GENERAL: PAINLEVEL_OUTOF10: 6

## 2022-08-19 ASSESSMENT — PAIN DESCRIPTION - LOCATION: LOCATION: BACK

## 2022-08-19 ASSESSMENT — PAIN DESCRIPTION - DESCRIPTORS: DESCRIPTORS: ACHING

## 2022-08-19 ASSESSMENT — PAIN DESCRIPTION - FREQUENCY: FREQUENCY: CONTINUOUS

## 2022-08-19 ASSESSMENT — PAIN DESCRIPTION - PAIN TYPE: TYPE: CHRONIC PAIN

## 2022-08-19 NOTE — PROGRESS NOTES
ONCOLOGY HEMATOLOGY CARE ([facility])  PROGRESS NOTE      HISTORY OF PRESENT ILLNESS   Les Santa is a 81 y.o. male with past medical history significant for Arthritis, CAD coronary artery disease, GERD, hyperlipidemia, hypertension, right kidney cyst, thyroid disease, CVA, who presents to the ED due to constipation unresponsive to use of Dulcolax and MiraLAX. Impression   1. No acute intraabdominal process identified. 2. Colonic diverticulosis without CT evidence of diverticulitis. 3. Punctate nonobstructing bilateral renal stones. No obstructive uropathy   identified. 4. No bowel obstruction. 5. Severe atherosclerotic disease. He is noted to have Hgb 8.0, platelet count 694. This is consistent with labs dating back to 2020. Latest Reference Range & Units 8/18/22 02:54   Ferritin 30 - 400 NG/   Iron 59 - 158 ug/dL 49 (L)   UIBC 110 - 370 ug/dL 169   TIBC 250 - 450 ug/dL 218 (L)   Transferrin % 10 - 44 % 22   FOLATE, FOLAT 3.1 - 17.5 NG/ML 7.9   Vitamin B-12 211 - 911 pg/ml >2000 (H)   (L): Data is abnormally low  (H): Data is abnormally high    He has had workup per Dr. Fabiola Barragan having had a bone marrow biopsy 11/21. 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 He notes unintentional weight loss of 29 pounds in past two months. Reports appetite is poor, but lives alone and cooks for self. Reviewed 5/22 SPEP, [place] Lambda light chains, beta 2 microglobulin. He has known lung nodule for which he is receiving follow up. This morning he denied acute complaints. He has one bowel movement. Had breakfast.  8/19/2022 Hg 9.4 and platelet 972. I advise to ambulate and follow up with us when he is discharged.     PHYSICAL EXAM    Vitals: /77   Pulse 64   Temp 98.6 °F (37 °C)   Resp 13   Ht 5' 11\" (1.803 m)   Wt 142 lb 3.2 oz (64.5 kg)   SpO2 97%   BMI 19.83 kg/m² CONSTITUTIONAL: awake, alert, cooperative, no apparent distress, thin  EYES: sclera clear and conjunctiva normal  ENT: Normocephalic, without obvious abnormality, atraumatic  NECK: supple, symmetrical  HEMATOLOGIC/LYMPHATIC: no cervical, supraclavicular or axillary lymphadenopathy   LUNGS: no increased work of breathing and clear to auscultation   CARDIOVASCULAR: regular rate and rhythm, normal S1 and S2, no murmur noted  ABDOMEN: normal bowel sound, soft, non-distended, non-tender, no masses palpated, no hepatosplenomegaly   MUSCULOSKELETAL: full range of motion noted, tone is normal  NEUROLOGIC: awake, alert, oriented to name, place and time. Motor skills grossly intact. SKIN: Normal skin color, texture, turgor and no jaundice. Skin appears intact   EXTREMITIES: no LE edema    LABORATORY RESULTS  CBC:   Recent Labs     08/17/22  0027 08/18/22  0254 08/19/22  0504   WBC 5.9 6.6 6.9   HGB 9.6* 8.9* 9.4*   * 124* 129*     BMP:    Recent Labs     08/17/22  0027 08/18/22  0254 08/19/22  0504    138 138   K 4.3 4.1 4.2    106 103   CO2 20* 24 23   BUN 31* 27* 31*   CREATININE 2.4* 2.3* 2.2*   GLUCOSE 92 109* 107*     ASSESSMENT/RECOMMENDATION    Anemia- multifactorial, chronic disease, nutritional status. Iron studies as above. BMB 2021 as above. Defer repeat BMB at this time. Thrombocytopenia - chronic. To continue to monitor CBC daily, no signs of bleeding. Unintentional weight loss- CT abdomen/pelvis as above, plan for CT chest for further evaluation. Has history of lung nodule. TSH noted to be 0.015. CKD- nephrology following. CBC on 8/19/2022 reviewed and discussed with patient. I recommend to follow up with us when he is discharged.

## 2022-08-19 NOTE — PROGRESS NOTES
Physical Therapy  Att. Pt with 4 family members visiting and declined tx at this time. Pt also had worked with OT earlier in day.

## 2022-08-19 NOTE — PROGRESS NOTES
Progress Note( Dr. Bo Oro)  8/18/2022  Subjective:   Admit Date: 8/16/2022  PCP: Itzel Gutierrez MD    Admitted For :Generalized weakness weight loss    Consulted For: Reviewed thyroid function test question of need adjustment    Interval History: Patient still feels tired has palpitations cardia    Denies any chest pains,   Denies SOB . Denies nausea or vomiting. No new bowel or bladder symptoms. Intake/Output Summary (Last 24 hours) at 8/18/2022 2228  Last data filed at 8/18/2022 0907  Gross per 24 hour   Intake --   Output 600 ml   Net -600 ml       DATA    CBC:   Recent Labs     08/17/22  0027 08/18/22  0254   WBC 5.9 6.6   HGB 9.6* 8.9*   * 124*    CMP:  Recent Labs     08/17/22  0027 08/18/22  0254    138   K 4.3 4.1    106   CO2 20* 24   BUN 31* 27*   CREATININE 2.4* 2.3*   CALCIUM 9.3 9.0     Lipids:   Lab Results   Component Value Date/Time    CHOL 116 06/26/2020 01:55 PM    HDL 48 06/26/2020 01:55 PM    TRIG 80 06/26/2020 01:55 PM     Glucose:No results for input(s): POCGLU in the last 72 hours. [id number]:  Lab Results   Component Value Date/Time    LABA1C 5.2 08/18/2022 02:54 AM     High Sensitivity TSH:   Lab Results   Component Value Date/Time    TSHHS 0.015 08/17/2022 12:27 AM     Free T3:   Lab Results   Component Value Date/Time    FT3 2.1 08/17/2022 12:27 AM     Free T4:  Lab Results   Component Value Date/Time    T4FREE 1.25 08/17/2022 12:27 AM       CT CHEST WO CONTRAST   Preliminary Result   No acute cardiopulmonary findings. Unchanged ground-glass nodules within the left upper lobe measuring up to   approximately 7 mm. Findings are similar dating back to 11/30/2021. Follow-up recommendations are listed below.       RECOMMENDATIONS:   Fleischner Society guidelines for follow-up and management of incidentally   detected subsolid pulmonary nodules:      Solitary ground glass nodule   < 6 mm - No routine follow-up.   > than or equal to 6 mm - CT at 6-12 months to confirm persistence, then CT   every 2 years until 5 years. Solitary part-solid nodules   < 6 mm - No routine follow-up.   > than or equal to 6 mm - CT at 3-6 to confirm persistence. If unchanged and   solid component remains less than 6 mm, annual CT should be performed for 5   years. Multiple subsolid nodules   < 6 mm - CT at 3-6 months. If stable, consider CT at 2 and 4 years. > than or equal to 6 mm - CT at 3-6 months. Subsequent management based on   the most suspicious nodule(s). - Low risk patients include individuals with minimal or absent history of   smoking and other known risk factors. - High risk patients include individuals with a history or smoking or known   risk factors. Radiology 2017 http://pubs. rsna.org/doi/full/10.1148/radiol. 8087072125         CT HEAD WO CONTRAST   Final Result   No acute intracranial abnormality. NM MYOCARDIAL SPECT REST EXERCISE OR RX   Final Result      US RETROPERITONEAL LIMITED   Final Result   No acute abnormalities are identified to explain acute on chronic renal   failure.               Scheduled Medicines   Medications:    pregabalin  25 mg Oral Once    tamsulosin  0.4 mg Oral Daily    [Held by provider] levothyroxine  75 mcg Oral Daily    atorvastatin  10 mg Oral Daily    sodium chloride flush  5-40 mL IntraVENous 2 times per day    pantoprazole  40 mg Oral QAM AC    polyethylene glycol  17 g Oral Daily    sertraline  100 mg Oral Daily    QUEtiapine  12.5 mg Oral Nightly    heparin (porcine)  5,000 Units SubCUTAneous 3 times per day    vitamin B-12  1,000 mcg Oral Daily    ezetimibe  10 mg Oral Daily    metoprolol succinate  100 mg Oral Daily      Infusions:    sodium chloride           Objective:   Vitals: BP (!) 153/71   Pulse 75   Temp 98.1 °F (36.7 °C)   Resp 18   Ht 5' 11\" (1.803 m)   Wt 142 lb 3.2 oz (64.5 kg)   SpO2 99%   BMI 19.83 kg/m²   General appearance: alert and cooperative with exam  Neck: no JVD or bruit  Thyroid : Normal lobes   Lungs: Has Vesicular Breath sounds   Heart:  regular rate and rhythm  Abdomen: soft, non-tender; bowel sounds normal; no masses,  no organomegaly  Musculoskeletal: Normal  Extremities: extremities normal, , no edema  Neurologic:  Awake, alert, oriented to name, place and time. Cranial nerves II-XII are grossly intact. Motor is  intact. Sensory is intact. ,  and gait is normal.    Assessment:     Patient Active Problem List:     Melena     GI bleed     Acute kidney injury superimposed on CKD (HCC)     Stage 3b chronic kidney disease (HCC)     Persistent proteinuria     Hypertensive heart and renal disease     History of anemia due to CKD     Acute anemia     Generalized weakness     HA (generalized anxiety disorder)     MDD (major depressive disorder), recurrent episode, moderate (HCC)     Heart palpitations     Tremors of nervous system     Peripheral polyneuropathy     Balance disorder     Thrombocytopenia ([address]      Plan:     Reviewed POC blood glucose . Labs and X ray results   Reviewed Current Medicines   Hold Synthroid for next few more days  Being seen by cardiology  Will follow     .      Trena Flores MD, MD

## 2022-08-19 NOTE — PROGRESS NOTES
Occupational Therapy    Occupational Therapy Treatment Note    Name: Harshad Bedolla MRN: [id number] :   1935   Date:  2022   Admission Date: 2022 Room:  [address] [place] [address]-     Primary Problem: Acute anemia     Restrictions/Precautions: General Precautions, Fall Risk    Communication with other providers: RN approved session. Subjective:  Patient states:  Pt supine in bed and agreeable to therapy session. Pain:   Location, Type, Intensity (0/10 to 10/10): Denies pain    Objective:    Observation: Pt supine in bed upon arrival.  Objective Measures:  Pt alert and oriented. Treatment, including education:  Self Care Training:   Cues were given for safety, sequence, UE/LE placement, visual cues, and balance. Activities performed today included dressing, toileting, hand hygiene, and grooming. Pt supine in bed upon arrival and agreeable to therapy session. Pt completed bed mobility supine to sit with head of bed elevated and use of bed rails with SBA. Pt seated edge of bed and donned gait belt and completed sit to stand from edge of bed with CGA and completed functional mobility to bathroom with WW. Pt completed face washing with set up standing at sink with set up CGA for standing balance. Pt completed seated rest break on toilet with set up and MIN A for stand to sit. Pt doffed and donned undergarment with set up and supervision. Pt completed sit to stand from toilet with CGA. Pt completed mobility back to chair and rested. Pt completed sit to stand from edge of bed with CGA and WW and completed oral hygiene at sink with CGA. Pt seated in chair with all needs met and call light in reach.     Assessment / Impression:    Patient's tolerance of treatment: fair  Adverse Reaction: none  Significant change in status and impact:  none  Barriers to improvement: activity tolerance    Goals:  Pt goal: go home  Time Frame for STGs: discharge  Goal 1: Pt will perform UE ADLs Independent  Goal 2: Pt will perform LE ADLs Independent-addressed  Goal 3: Pt will perform toileting Independent  Goal 4: Pt will perform functional transfer Independent-addressed  Goal 5: Pt will perform functional mobility Independent-addressed  Goal 6: Pt will perform therex/theract in order to increase functional activity tolerance and dynamic standing balance-addressed    Plan for Next Session:    Continue OT POC and progress standing self care skills.     Time in:  0914  Time out:  0952  Timed treatment minutes:  38  Total treatment time:  38    Electronically signed by:    ALVERTO Hughes,   8/19/2022, 10:01 AM

## 2022-08-19 NOTE — PROGRESS NOTES
Physician Progress Note      Ema Alcantar  CSN #:                  [id number]  :                       1935  ADMIT DATE:       2022 11:17 PM  [address] [place] DATE:  RESPONDING  PROVIDER #:        Brandy Waters MD          QUERY TEXT:    Hospitalists,    Patient admitted with generalized weakness in the setting of anemia and sadia    GI bleeding documented. If possible, please document in progress notes and   discharge summary the suspected cause  of the GI bleeding: The medical record reflects the following:  Risk Factors: nonspecific  Clinical Indicators: documentation of anemia and GI bleeding in Internal Med   progress note  Treatment: labs, protonix    Thank you,  Mk Nava RN CDS  [phone]  Options provided:  -- GI bleeding due to ##please specify, please specify.   -- GI bleed ruled out  -- Other - I will add my own diagnosis  -- Disagree - Not applicable / Not valid  -- Disagree - Clinically unable to determine / Unknown  -- Refer to Clinical Documentation Reviewer    PROVIDER RESPONSE TEXT:    GI bleed ruled out    Query created by: Chelsea Louie on 2022 2:40 PM      Electronically signed by:  Brandy Waters MD 2022 2:51 PM

## 2022-08-19 NOTE — PROGRESS NOTES
V2.0  [facility] Hospitalist Progress Note      Name:  Jay Jo /Age/Sex: 1935  (81 y.o. male)   MRN & CSN:  [id number] & [id number] Encounter Date/Time: 2022 8:13 AM EDT    Location:  -A PCP: Ciera Tavares MD       Hospital Day: 4    Assessment and Plan:   Jay Jo is a 81 y.o. male with pmh of CAD, HTN, right kidney cyst, GERD, CAD, thyroid disease who presents with constipation, lightheadedness, palpitations, weight loss, insomnia, and subsequently found to have Acute anemia      Plan:    Generalized weakness with chronic debility setting of acute anemia  -- CBC, BMP in AM  --PT/OT  -- Would benefit from SNF for therapy upon discharge, Patient not accepted by ARU  --No reports of bleeding; HDS  Night sweats/ weight loss-patient on 30 pound weight loss  --Endocrinology /Hemotology following; apprec. Recs. Palpitations  Orthostatic hypotension  -- Cardiology consulted, signed off; apprec. Recs. --Echo shows EF 50-55% and normal  stress test per cardiology; outpatient event monitor on d/c.   --encourage compression stockings    Constipation: Presented with constipation; reports 4 days. Tried Dulcolax without relief. Patient reports successful BM this AM x 2  -continue to monitor, stool softeners as needed  CT scan of the abdominal pelvis showed no acute intra-abdominal processes. Advance diet as tolerated. Dental infection:   --Recently On clindamycin for dental infection; no complaints     History of pancytopenia: Extensive workup done by hematology/ oncology at [facility]   --bone marrow biopsy -. Per hem/onc; defer BMB at this time. Patient to follow up as outpatient    Benign essential hypertension: Home medications include Hyzaar. 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   Estimated baseline creatinine of 1.7. 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  -Nephro following  --Hematology s/o, recommends outpatient follow up     Coronary artery disease s/p stent placement: stable,   -- Home regimen included aspirin and Plavix previously. Antiplatelets contraindicated secondary to frequency of GI bleed prior to admission     Elevated BNP:   -- Troponin -neg; Echo shows EF 50-55% on left ventricle systolic function; EKG sinus bradycardia, no ischemic changes    Chronic Conditions    BPH on Flomax in the hospital, resume home meds on discharge  Hypothyroidism: On levothyroxine; TSH 0.015; T3 2.1; T4 1.25- on hold per endocrinology, f/u as outpatient  Insomnia on trazodone  Depression on sertraline  Punctate nonobstructing bilateral renal stones- monitor outpatient  Colonic diverticulosis- Monitor  Severe atherosclerotic disease involving aorta- continue statin,zetia,BB  GI bleed-monitor hemoglobin; continue Protonix    Diet ADULT DIET; Regular  ADULT ORAL NUTRITION SUPPLEMENT; Breakfast, Dinner; Standard High Calorie/High Protein Oral Supplement   DVT Prophylaxis [] Lovenox, [x]  Heparin, [] SCDs, [] Ambulation,  [] Eliquis, [] Xarelto  [] Coumadin   Code Status Full Code   Disposition From: Home  Expected Disposition: ARU/SNF? ? Estimated Date of Discharge: 2 - 3 days  Patient requires continued admission due to ongoing neuro, psych, cardio, onc, and endocrinology work up    Surrogate Decision Maker/ POA self     Subjective:     Chief Complaint:        Patient seen and examined at bedside, patient tells me that he has had two BMs  this morning. Palpitations are less frequent. Denies CP         Review of Systems:    Review of Systems   All other systems reviewed and are negative. Objective:      Intake/Output Summary (Last 24 hours) at 8/19/2022 0840  Last data filed at 8/19/2022 0326  Gross per 24 hour   Intake --   Output 675 ml   Net -675 ml          Vitals: Vitals:    08/19/22 0735   BP: 136/77   Pulse: 64   Resp: 13   Temp: 98.6 °F (37 °C)   SpO2: 97%       Physical Exam:     General: NAD  Eyes: EOMI  ENT: neck supple  Cardiovascular: Regular rate. Denies CP  Respiratory: Clear to auscultation  Gastrointestinal: Soft, non tender, BS x4  Genitourinary: no suprapubic tenderness  Musculoskeletal: No edema  Skin: warm, dry  Neuro: Alert. and oriented x 3; denies dizziness and lightheadedness   Psych: Mood appropriate.      Medications:   Medications:    sennosides-docusate sodium  2 tablet Oral Daily    tamsulosin  0.4 mg Oral Daily    [Held by provider] levothyroxine  75 mcg Oral Daily    atorvastatin  10 mg Oral Daily    sodium chloride flush  5-40 mL IntraVENous 2 times per day    pantoprazole  40 mg Oral QAM AC    polyethylene glycol  17 g Oral Daily    sertraline  100 mg Oral Daily    QUEtiapine  12.5 mg Oral Nightly    heparin (porcine)  5,000 Units SubCUTAneous 3 times per day    vitamin B-12  1,000 mcg Oral Daily    ezetimibe  10 mg Oral Daily    metoprolol succinate  100 mg Oral Daily      Infusions:    sodium chloride       PRN Meds: sodium chloride flush, 5-40 mL, PRN  sodium chloride, , PRN  ondansetron, 4 mg, Q8H PRN   Or  ondansetron, 4 mg, Q6H PRN  acetaminophen, 650 mg, Q6H PRN   Or  acetaminophen, 650 mg, Q6H PRN  sennosides, 5 mL, BID PRN  hydrOXYzine pamoate, 25 mg, Daily PRN  hydrALAZINE, 10 mg, Q6H PRN      Labs      Recent Results (from the past 24 hour(s))   Basic Metabolic Panel    Collection Time: 08/19/22  5:04 AM   Result Value Ref Range    Sodium 138 135 - 145 MMOL/L    Potassium 4.2 3.5 - 5.1 MMOL/L    Chloride 103 99 - 110 mMol/L    CO2 23 21 - 32 MMOL/L    Anion Gap 12 4 - 16    BUN 31 (H) 6 - 23 MG/DL    Creatinine 2.2 (H) 0.9 - 1.3 MG/DL    Glucose 107 (H) 70 - 99 MG/DL    Calcium 9.0 8.3 - 10.6 MG/DL    GFR Non- 29 (L) >60 mL/min/1.73m2    GFR  35 (L) >60 mL/min/1.73m2   CBC    Collection Time: 08/19/22  5:04 AM   Result Value Ref Range    WBC 6.9 4.0 - 10.5 K/CU MM    RBC 3.28 (L) 4.6 - 6.2 M/CU MM    Hemoglobin 9.4 (L) 13.5 - 18.0 GM/DL    Hematocrit 28.5 (L) 42 - 52 %    MCV 86.9 78 - 100 FL    MCH 28.7 27 - 31 PG    MCHC 33.0 32.0 - 36.0 %    RDW 14.4 11.7 - 14.9 %    Platelets 191 (L) 603 - 440 K/CU MM    MPV 9.2 7.5 - 11.1 FL   Magnesium    Collection Time: 08/19/22  5:04 AM   Result Value Ref Range    Magnesium 1.8 1.8 - 2.4 mg/dl   Phosphorus    Collection Time: 08/19/22  5:04 AM   Result Value Ref Range    Phosphorus 3.0 2.5 - 4.9 MG/DL   POCT Glucose    Collection Time: 08/19/22  5:38 AM   Result Value Ref Range    POC Glucose 101 (H) 70 - 99 MG/DL        Imaging/Diagnostics Last 24 Hours   Echocardiogram complete 2D with doppler with color    Result Date: 8/17/2022  Transthoracic Echocardiography Report (TTE)  Demographics   Patient Name        [address] [place] [address]  Date of Study        08/17/2022   Date of Birth       1935     Gender               Male   Age                 81 year(s)     Race                 Black   Patient Number      [id number]     Room Number          5579   Visit Number        [id number]   Corporate ID        [id number]   Accession Number    [id number]     Alysa Wilder RVT   Ordering Physician                 Interpreting         Tamy Garcia                                     Physician            MD  Procedure Type of Study   TTE procedure:ECHOCARDIOGRAM COMPLETE 2D W DOPPLER W COLOR. Procedure Date Date: 08/17/2022 Start: 12:06 PM Study Location: Portable Technical Quality: Fair visualization Indications:Elevated BNP. Patient Status: Routine Height: 71 inches Weight: 151 pounds BSA: 1.87 m2 BMI: 21.06 kg/m2 HR: 62 bpm BP: 172/70 mmHg  Conclusions   Summary  Technically difficult study.   Left ventricular systolic function is normal.  Ejection fraction is visually estimated at 50-55%. No significant valvular disease noted. No evidence of any pericardial effusion. Signature   ------------------------------------------------------------------  Electronically signed by Josesito You MD (Interpreting  physician) on 08/17/2022 at 04:09 PM  ------------------------------------------------------------------   Findings   Left Ventricle  Left ventricular systolic function is normal.  Ejection fraction is visually estimated at 50-55%. No regional wall motion abnormalites. Left ventricle size is normal.  Normal diastolic function. Left Atrium  Essentially normal left atrium. Right Atrium  Essentially normal right atrium. Right Ventricle  Essentially normal right ventricle. Aortic Valve  Structurally normal aortic valve. Mitral Valve  Structurally normal mitral valve. Tricuspid Valve  Mild tricuspid regurgitation; RVSP: 31 mmHg. Pulmonic Valve  Pulmonic valve is structurally normal.   Pericardial Effusion  No evidence of any pericardial effusion. Pleural Effusion  No evidence of pleural effusion. Miscellaneous  IVC and abdominal aorta are within normal limits.   M-Mode/2D Measurements & Calculations   LV Diastolic Dimension:  LV Systolic Dimension:  LA Dimension: 2.6 cmAO Root  4.93 cm                  3.56 cm                 Dimension: 3.6 cmLA Area:  LV FS:27.8 %             LV Volume Diastolic: 49 10 cm2  LV PW Diastolic: 2.69 cm ml  LV PW Systolic: 4.45 cm  LV Volume Systolic: 23  Septum Diastolic: 6.83   ml  cm                       LV EDV/LV EDV Index: 49  Septum Systolic: 5.32 cm EQ/39 L6KT ESV/LV ESV   LA/Aorta: 0.72  CO: 5.82 l/min           Index: 23 ml/12 m2  CI: 3.11 l/m*m2          EF Calculated (A4C):    LA volume/Index: 17 ml /9m2                           53.1 %  LV Area Diastolic: 93.9  EF Calculated (2D):  cm2                      53.5 %  LV Area Systolic: 33.5  cm2                      LV Length: 7.42 cm                            LVOT: 2.2 cm  Doppler Measurements & Calculations   MV Peak E-Wave: 52.8  AV Peak Velocity: 126 cm/s   LVOT Peak Velocity: 136  cm/s                  AV Peak Gradient: 6.35 mmHg  cm/s  MV Peak A-Wave: 82.4  AV Mean Velocity: 90.1 cm/s  LVOT Mean Velocity: 88.9  cm/s                  AV Mean Gradient: 4 mmHg     cm/s  MV E/A Ratio: 0.64    AV VTI: 26 cm                LVOT Peak Gradient: 7  MV Peak Gradient:     AV Area (Continuity):3.61    mmHgLVOT Mean Gradient: 4  1.12 mmHg             cm2                          mmHg                                                     Estimated RVSP: 31 mmHg  MV P1/2t: 62 msec     LVOT VTI: 24.7 cm            Estimated RAP:3 mmHg  MVA by PHT:3.55 cm2                        Estimated PASP: 31.3 mmHg  MV E' Septal                                       TR Velocity:266 cm/s  Velocity: 9.54 cm/s                                TR Gradient:28.3 mmHg  MV E' Lateral  Velocity: 8.22 cm/s  MV E/E' septal: 5.53  MV E/E' lateral: 6.42      US RETROPERITONEAL LIMITED    Result Date: 8/17/2022  EXAMINATION: ULTRASOUND OF THE KIDNEYS 8/17/2022 12:23 pm COMPARISON: Abdomen pelvis CT, 08/13/2022 HISTORY: ORDERING SYSTEM PROVIDED HISTORY: acute renal failure on CKD TECHNOLOGIST PROVIDED HISTORY: Reason for exam:->acute renal failure on CKD FINDINGS: The right kidney measures 9.2 cm in length and the left kidney measures 7.9 cm in length. Multiple cystic structures are seen within both kidneys. Largest on right measures 1.1 cm. Largest on the left measures 2.1 cm. The cystic structures are considered benign and require no specific follow-up. In addition, there is a complex multiloculated cystic structure with thick septae day in the upper pole of the right kidney, measuring up to 3.8 cm. This has been present since 2011, and presumably reflects a benign, complex cyst, and requires no specific follow-up. No hydronephrosis is identified within either kidney.      No acute abnormalities are identified to explain acute on chronic renal failure.        Electronically signed by EVERTON Shepherd CNP on 8/19/2022 at 8:40 AM

## 2022-08-19 NOTE — PROGRESS NOTES
Progress Note( Dr. Garcia Chol)  8/19/2022  Subjective:   Admit Date: 8/16/2022  PCP: Denise Scott MD    Admitted For :Generalized weakness weight loss    Consulted For: Reviewed thyroid function test question of need adjustment    Interval History: Patient still feels tired has palpitations   But his palpitations much better heart rate is around 70+    Denies any chest pains,   Denies SOB . Denies nausea or vomiting. Not eating much  No new bowel or bladder symptoms. Intake/Output Summary (Last 24 hours) at 8/19/2022 1758  Last data filed at 8/19/2022 0326  Gross per 24 hour   Intake --   Output 475 ml   Net -475 ml         DATA    CBC:   Recent Labs     08/17/22  0027 08/18/22  0254 08/19/22  0504   WBC 5.9 6.6 6.9   HGB 9.6* 8.9* 9.4*   * 124* 129*      CMP:  Recent Labs     08/17/22  0027 08/18/22  0254 08/19/22  0504    138 138   K 4.3 4.1 4.2    106 103   CO2 20* 24 23   BUN 31* 27* 31*   CREATININE 2.4* 2.3* 2.2*   CALCIUM 9.3 9.0 9.0       Lipids:   Lab Results   Component Value Date/Time    CHOL 116 06/26/2020 01:55 PM    HDL 48 06/26/2020 01:55 PM    TRIG 80 06/26/2020 01:55 PM     Glucose:  Recent Labs     08/19/22  0538   POCGLU 101*     [id number]:  Lab Results   Component Value Date/Time    LABA1C 5.2 08/18/2022 02:54 AM     High Sensitivity TSH:   Lab Results   Component Value Date/Time    TSHHS 0.015 08/17/2022 12:27 AM     Free T3:   Lab Results   Component Value Date/Time    FT3 2.1 08/17/2022 12:27 AM     Free T4:  Lab Results   Component Value Date/Time    T4FREE 1.25 08/17/2022 12:27 AM       CT CHEST WO CONTRAST   Preliminary Result   No acute cardiopulmonary findings. Unchanged ground-glass nodules within the left upper lobe measuring up to   approximately 7 mm. Findings are similar dating back to 11/30/2021. Follow-up recommendations are listed below.       RECOMMENDATIONS:   Fleischner Society guidelines for follow-up and management of incidentally detected subsolid pulmonary nodules:      Solitary ground glass nodule   < 6 mm - No routine follow-up.   > than or equal to 6 mm - CT at 6-12 months to confirm persistence, then CT   every 2 years until 5 years. Solitary part-solid nodules   < 6 mm - No routine follow-up.   > than or equal to 6 mm - CT at 3-6 to confirm persistence. If unchanged and   solid component remains less than 6 mm, annual CT should be performed for 5   years. Multiple subsolid nodules   < 6 mm - CT at 3-6 months. If stable, consider CT at 2 and 4 years. > than or equal to 6 mm - CT at 3-6 months. Subsequent management based on   the most suspicious nodule(s). - Low risk patients include individuals with minimal or absent history of   smoking and other known risk factors. - High risk patients include individuals with a history or smoking or known   risk factors. Radiology 2017 http://pubs. rsna.org/doi/full/10.1148/radiol. 6043811856         CT HEAD WO CONTRAST   Final Result   No acute intracranial abnormality. NM MYOCARDIAL SPECT REST EXERCISE OR RX   Final Result      US RETROPERITONEAL LIMITED   Final Result   No acute abnormalities are identified to explain acute on chronic renal   failure.               Scheduled Medicines   Medications:    sennosides-docusate sodium  2 tablet Oral Daily    tamsulosin  0.4 mg Oral Daily    [Held by provider] levothyroxine  75 mcg Oral Daily    atorvastatin  10 mg Oral Daily    sodium chloride flush  5-40 mL IntraVENous 2 times per day    pantoprazole  40 mg Oral QAM AC    polyethylene glycol  17 g Oral Daily    sertraline  100 mg Oral Daily    QUEtiapine  12.5 mg Oral Nightly    heparin (porcine)  5,000 Units SubCUTAneous 3 times per day    vitamin B-12  1,000 mcg Oral Daily    ezetimibe  10 mg Oral Daily    metoprolol succinate  100 mg Oral Daily      Infusions:    sodium chloride           Objective:   Vitals: BP (!) 144/75   Pulse 56   Temp 98.1 °F (36.7 °C) Resp 13   Ht 5' 11\" (1.803 m)   Wt 142 lb 3.2 oz (64.5 kg)   SpO2 96%   BMI 19.83 kg/m²   General appearance: alert and cooperative with exam  Neck: no JVD or bruit  Thyroid : Normal lobes   Lungs: Has Vesicular Breath sounds   Heart:  regular rate and rhythm  Abdomen: soft, non-tender; bowel sounds normal; no masses,  no organomegaly  Musculoskeletal: Normal  Extremities: extremities normal, , no edema  Neurologic:  Awake, alert, oriented to name, place and time. Cranial nerves II-XII are grossly intact. Motor is  intact. Sensory is intact. ,  and gait is normal.    Assessment:     Patient Active Problem List:     Melena     GI bleed     Acute kidney injury superimposed on CKD (HCC)     Stage 3b chronic kidney disease (HCC)     Persistent proteinuria     Hypertensive heart and renal disease     History of anemia due to CKD     Acute anemia     Generalized weakness     HA (generalized anxiety disorder)     MDD (major depressive disorder), recurrent episode, moderate (HCC)     Heart palpitations     Tremors of nervous system     Peripheral polyneuropathy     Balance disorder     Thrombocytopenia ([address]      Plan:     Reviewed POC blood glucose . Labs and X ray results   Reviewed Current Medicines   Hold Synthroid for next few more days  Being seen by cardiology  Also consulted nephrology  Also consulted hematology for anemia and thrombocytopenia to be chronic  Will follow     .      Elma Person MD, MD

## 2022-08-19 NOTE — PROGRESS NOTES
Nephrology Progress Note        [address], [address] [address]  Phone: [phone]  Office Hours: 8:30AM - 4:30PM  Monday - Friday 8/19/2022 7:28 AM  Subjective:   Admit Date: 8/16/2022  PCP: Jennifer Samuels MD  Interval History:   Doing ok  Reports only 1 bm  Bp at goal    Diet: ADULT DIET; Regular  ADULT ORAL NUTRITION SUPPLEMENT; Breakfast, Dinner; Standard High Calorie/High Protein Oral Supplement      Data:   Scheduled Meds:   tamsulosin  0.4 mg Oral Daily    [Held by provider] levothyroxine  75 mcg Oral Daily    atorvastatin  10 mg Oral Daily    sodium chloride flush  5-40 mL IntraVENous 2 times per day    pantoprazole  40 mg Oral QAM AC    polyethylene glycol  17 g Oral Daily    sertraline  100 mg Oral Daily    QUEtiapine  12.5 mg Oral Nightly    heparin (porcine)  5,000 Units SubCUTAneous 3 times per day    vitamin B-12  1,000 mcg Oral Daily    ezetimibe  10 mg Oral Daily    metoprolol succinate  100 mg Oral Daily     Continuous Infusions:   sodium chloride       PRN Meds:sodium chloride flush, sodium chloride, ondansetron **OR** ondansetron, acetaminophen **OR** acetaminophen, sennosides, hydrOXYzine pamoate, hydrALAZINE  I/O last 3 completed shifts:  In: -   Out: 0464 [Urine:1550]  No intake/output data recorded.     Intake/Output Summary (Last 24 hours) at 8/19/2022 0728  Last data filed at 8/19/2022 0326  Gross per 24 hour   Intake --   Output 675 ml   Net -675 ml       CBC:   Recent Labs     08/17/22  0027 08/18/22  0254 08/19/22  0504   WBC 5.9 6.6 6.9   HGB 9.6* 8.9* 9.4*   * 124* 129*       BMP:    Recent Labs     08/17/22  0027 08/18/22  0254 08/19/22  0504    138 138   K 4.3 4.1 4.2    106 103   CO2 20* 24 23   BUN 31* 27* 31*   CREATININE 2.4* 2.3* 2.2*   GLUCOSE 92 109* 107*         Objective:   Vitals: BP (!) 154/77   Pulse 57   Temp 99.3 °F (37.4 °C)   Resp 16   Ht 5' 11\" (1.803 m)   Wt 142 lb 3.2 oz (64.5 kg)   SpO2 98%   BMI 19.83 kg/m²   General appearance: alert and cooperative with exam, in no acute distress  HEENT: normocephalic, atraumatic,   Neck: supple, trachea midline  Lungs:  breathing comfortably   Abdomen: non distended,   Extremities: extremities atraumatic, no cyanosis or edema  Neurologic: alert, oriented, follows commands, interactive    Assessment and Plan:     OLIVER on CKD4  Deconditioning  Hyperthyroidism  Tremors/dizziness/     Plan;  Cr stable, will continue to monitor  Monitor BP and adjust meds as needed, keep SBP around 140-150s  Avoid nephrotoxins  Bowel regimen to facilitate defecation                     Electronically signed by Minna Toney DO on 8/19/2022 at 7:28 MD Delia Sanchez DO [address],  [address]  [facility], [place] [id number]  PHONE: [phone]  FAX: [phone]

## 2022-08-19 NOTE — PROGRESS NOTES
Comprehensive Nutrition Assessment    Type and Reason for Visit:  Reassess    Nutrition Recommendations/Plan:   Continue regular diet   Will continue to offer standard oral nutrition supplements to provide 350 kcal and 20 grams of protein each and frozen oral supplements to provide 290 kcal and 9 grams of protein. Encourage consistent meal intake   Document all po intake   Will continue to follow up during stay      Malnutrition Assessment:  Malnutrition Status: Moderate malnutrition (08/19/22 1235)    Context:  Chronic Illness     Findings of the 6 clinical characteristics of malnutrition:  Energy Intake:  75% or less estimated energy requirements for 1 month or longer  Weight Loss:  Mild weight loss (specify amount and time period) (progressive loss with 10% in past year)     Body Fat Loss:  Mild body fat loss Orbital, Buccal region   Muscle Mass Loss:  Mild muscle mass loss Temples (temporalis), Clavicles (pectoralis & deltoids)     Nutrition Assessment:    Tolerating regular diet with high calorie supplement. Limited po data, patient reports eating meals and likes supplement. Agreeable to having supplements during stay, will trial frozen supplement. Admits very inconsistent intake at home, lives alone and often does not want to prepare meals. Discussed option to have meals on wheels, patient considering options. Encouraged to continue eating well and drinkning supplements. Will continue to follow at high nutrition risk, meeting criteria for at least moderate malnutrition in illness. Nutrition Related Findings:    sitting up in chair visiting with nephew, lunch tray present ate more than 50% of meal Wound Type: None       Current Nutrition Intake & Therapies:    Average Meal Intake: Unable to assess  Average Supplements Intake: % (per patient)  ADULT DIET;  Regular  ADULT ORAL NUTRITION SUPPLEMENT; Breakfast, Dinner; Standard High Calorie/High Protein Oral Supplement    Anthropometric Measures:  Height: 5' 11\" (180.3 cm)  Ideal Body Weight (IBW): 172 lbs (78 kg)       Current Body Weight: 142 lb 3.2 oz (64.5 kg), 82.7 % IBW.  Weight Source: Bed Scale  Current BMI (kg/m2): 19.8  Usual Body Weight: 158 lb (71.7 kg) (last september, 2019 wt 172#)  % Weight Change (Calculated): -10  Weight Adjustment For: No Adjustment                 BMI Categories: Underweight (BMI less than 22) age over 72    Estimated Daily Nutrient Needs:  Energy Requirements Based On: Kcal/kg  Weight Used for Energy Requirements: Current  Energy (kcal/day): 8577-0418 (25-30 olive/kg)  Weight Used for Protein Requirements: Current  Protein (g/day): 64-77 (1-1.2 g/kg)  Method Used for Fluid Requirements: 1 ml/kcal  Fluid (ml/day): 1900    Nutrition Diagnosis:   Moderate malnutrition, In context of chronic illness related to other (comment), psychological cause or life stress, altered GI function (reduced appetite in illness) as evidenced by constipation, poor intake prior to admission, weight loss, BMI    Nutrition Interventions:   Food and/or Nutrient Delivery: Continue Current Diet, Modify Oral Nutrition Supplement  Nutrition Education/Counseling: Education initiated  Coordination of Nutrition Care: Continue to monitor while inpatient, Coordination of Care  Plan of Care discussed with: patient    Goals:  Previous Goal Met: Progressing toward Goal(s)  Goals: PO intake 50% or greater, by next RD assessment       Nutrition Monitoring and Evaluation:   Behavioral-Environmental Outcomes: None Identified  Food/Nutrient Intake Outcomes: Food and Nutrient Intake, Supplement Intake  Physical Signs/Symptoms Outcomes: Biochemical Data, Meal Time Behavior, Skin, Weight    Discharge Planning:    Continue current diet, Continue Oral Nutrition Supplement     Hannah Heck RD, LD  Contact: [phone]

## 2022-08-19 NOTE — PLAN OF CARE
Problem: Discharge Planning  Goal: Discharge to home or other facility with appropriate resources  8/19/2022 0511 by Amber Gallagher RN  Outcome: Progressing  8/18/2022 1731 by Kym Rodriguez RN  Outcome: Progressing     Problem: Pain  Goal: Verbalizes/displays adequate comfort level or baseline comfort level  8/19/2022 0511 by Amber Gallagher RN  Outcome: Progressing  8/18/2022 1731 by Kym Rodriguez RN  Outcome: Progressing     Problem: ABCDS Injury Assessment  Goal: Absence of physical injury  8/19/2022 0511 by Amber Gallagher RN  Outcome: Progressing  8/18/2022 1731 by Kym Rodriguez RN  Outcome: Progressing     Problem: Safety - Adult  Goal: Free from fall injury  8/19/2022 0511 by Amber Gallagher RN  Outcome: Progressing  8/18/2022 1731 by Kym Rodriguez RN  Outcome: Progressing     Problem: Nutrition Deficit:  Goal: Optimize nutritional status  8/19/2022 0511 by Amber Gallagher RN  Outcome: Progressing  8/18/2022 1731 by Kym Rodriguez RN  Outcome: Progressing

## 2022-08-20 PROCEDURE — 1200000000 HC SEMI PRIVATE

## 2022-08-20 PROCEDURE — 2580000003 HC RX 258: Performed by: STUDENT IN AN ORGANIZED HEALTH CARE EDUCATION/TRAINING PROGRAM

## 2022-08-20 PROCEDURE — 94761 N-INVAS EAR/PLS OXIMETRY MLT: CPT

## 2022-08-20 PROCEDURE — 6360000002 HC RX W HCPCS: Performed by: INTERNAL MEDICINE

## 2022-08-20 PROCEDURE — 6370000000 HC RX 637 (ALT 250 FOR IP): Performed by: NURSE PRACTITIONER

## 2022-08-20 PROCEDURE — 6370000000 HC RX 637 (ALT 250 FOR IP): Performed by: INTERNAL MEDICINE

## 2022-08-20 PROCEDURE — 6370000000 HC RX 637 (ALT 250 FOR IP): Performed by: STUDENT IN AN ORGANIZED HEALTH CARE EDUCATION/TRAINING PROGRAM

## 2022-08-20 RX ADMIN — HEPARIN SODIUM 5000 UNITS: 5000 INJECTION INTRAVENOUS; SUBCUTANEOUS at 15:29

## 2022-08-20 RX ADMIN — EZETIMIBE 10 MG: 10 TABLET ORAL at 08:28

## 2022-08-20 RX ADMIN — SODIUM CHLORIDE, PRESERVATIVE FREE 10 ML: 5 INJECTION INTRAVENOUS at 22:18

## 2022-08-20 RX ADMIN — HYDROXYZINE PAMOATE 25 MG: 25 CAPSULE ORAL at 22:17

## 2022-08-20 RX ADMIN — CYANOCOBALAMIN TAB 1000 MCG 1000 MCG: 1000 TAB at 08:29

## 2022-08-20 RX ADMIN — PANTOPRAZOLE SODIUM 40 MG: 40 TABLET, DELAYED RELEASE ORAL at 05:56

## 2022-08-20 RX ADMIN — TAMSULOSIN HYDROCHLORIDE 0.4 MG: 0.4 CAPSULE ORAL at 08:28

## 2022-08-20 RX ADMIN — SODIUM CHLORIDE, PRESERVATIVE FREE 10 ML: 5 INJECTION INTRAVENOUS at 08:30

## 2022-08-20 RX ADMIN — ATORVASTATIN CALCIUM 10 MG: 10 TABLET, FILM COATED ORAL at 08:29

## 2022-08-20 RX ADMIN — SERTRALINE 100 MG: 50 TABLET, FILM COATED ORAL at 08:28

## 2022-08-20 RX ADMIN — METOPROLOL SUCCINATE 100 MG: 50 TABLET, EXTENDED RELEASE ORAL at 08:28

## 2022-08-20 RX ADMIN — SENNOSIDES AND DOCUSATE SODIUM 2 TABLET: 50; 8.6 TABLET ORAL at 08:28

## 2022-08-20 RX ADMIN — QUETIAPINE FUMARATE 12.5 MG: 25 TABLET ORAL at 22:17

## 2022-08-20 ASSESSMENT — PAIN DESCRIPTION - ONSET: ONSET: ON-GOING

## 2022-08-20 ASSESSMENT — PAIN DESCRIPTION - LOCATION: LOCATION: ABDOMEN;RIB CAGE

## 2022-08-20 ASSESSMENT — PAIN - FUNCTIONAL ASSESSMENT: PAIN_FUNCTIONAL_ASSESSMENT: ACTIVITIES ARE NOT PREVENTED

## 2022-08-20 ASSESSMENT — PAIN DESCRIPTION - DESCRIPTORS: DESCRIPTORS: ACHING

## 2022-08-20 ASSESSMENT — PAIN SCALES - GENERAL
PAINLEVEL_OUTOF10: 5
PAINLEVEL_OUTOF10: 0

## 2022-08-20 ASSESSMENT — PAIN DESCRIPTION - ORIENTATION: ORIENTATION: LEFT

## 2022-08-20 ASSESSMENT — PAIN DESCRIPTION - PAIN TYPE: TYPE: CHRONIC PAIN

## 2022-08-20 ASSESSMENT — PAIN DESCRIPTION - FREQUENCY: FREQUENCY: CONTINUOUS

## 2022-08-20 NOTE — PLAN OF CARE
Problem: Discharge Planning  Goal: Discharge to home or other facility with appropriate resources  Outcome: Progressing     Problem: Pain  Goal: Verbalizes/displays adequate comfort level or baseline comfort level  Outcome: Progressing     Problem: ABCDS Injury Assessment  Goal: Absence of physical injury  Outcome: Progressing     Problem: Safety - Adult  Goal: Free from fall injury  Outcome: Progressing     Problem: Nutrition Deficit:  Goal: Optimize nutritional status  Outcome: Progressing     Problem: Skin/Tissue Integrity  Goal: Absence of new skin breakdown  Description: 1. Monitor for areas of redness and/or skin breakdown  2. Assess vascular access sites hourly  3. Every 4-6 hours minimum:  Change oxygen saturation probe site  4. Every 4-6 hours:  If on nasal continuous positive airway pressure, respiratory therapy assess nares and determine need for appliance change or resting period.   Outcome: Progressing

## 2022-08-20 NOTE — PROGRESS NOTES
Internal Medicine Daily Progress Note @todate@                    Date of Admission: 8/16/2022  Allergies  Erythromycin and Pcn [penicillins]    Assessment/Plan    1. Generalized weakness multiple chronic conditions along with age adding to this generalized weakness. Patient has been denied by inpatient rehab and is not interested to go to SNF and would like to go back to her daughter's home this needs to be further explored otherwise patient is medically stable can be discharged. 2.  Hypertension blood pressure is stable at the present time continue to monitor tight control is not warranted in this patient at his age and overall conditions. 3 chronic renal insufficiency nephrology is on board continue to monitor and address. Clinically  4 BPH on medications at the present time continue   5. Chronic anemia H&H seems to be stable. 6 chronic thrombocytopenia hematology is on board continue to watch no further work-up at the present time. No bleeding.     Clinically patient seems to be stable at the present time once the disposition is arranged patient can return to discharged    Patient Active Problem List    Diagnosis Date Noted    Moderate malnutrition ([address] 08/19/2022    Tremors of nervous system 08/18/2022    Peripheral polyneuropathy 08/18/2022    Balance disorder 08/18/2022    Thrombocytopenia ([address] 08/18/2022    Generalized weakness 08/17/2022    HA (generalized anxiety disorder) 08/17/2022    MDD (major depressive disorder), recurrent episode, moderate ([address] 08/17/2022    Heart palpitations 08/17/2022    Acute anemia 08/16/2022    History of anemia due to CKD 09/14/2021    Stage 3b chronic kidney disease ([address] 10/29/2020    Persistent proteinuria 10/29/2020    Hypertensive heart and renal disease 10/29/2020    Melena 02/03/2020    GI bleed 02/03/2020    Acute kidney injury superimposed on CKD ([address] 02/03/2020                      Subjective:   No chief complaint on file.    Mr. Ward Owen of I am doing fine I have some difficulty in urination however unable to make urine well. Patient denies any chest pain nausea vomiting diarrhea no shortness of breath some weakness overall no dizziness and gets up quickly. Uses walker for the bathroom. Objective:   TEMPERATURE:  Current - Temp: 98.7 °F (37.1 °C); Max - Temp  Av.6 °F (37 °C)  Min: 98.1 °F (36.7 °C)  Max: 98.8 °F (37.1 °C)  RESPIRATIONS RANGE: Resp  Av.5  Min: 13  Max: 16  PULSE RANGE: Pulse  Av.6  Min: 56  Max: 69  BLOOD PRESSURE RANGE:  Systolic (29OPQ), TCU:883 , Min:142 , TP   ; Diastolic (87SCC), ROJ:41, Min:63, Max:76    PULSE OXIMETRY RANGE: SpO2  Av.4 %  Min: 96 %  Max: 98 %  24HR INTAKE/OUTPUT:    Intake/Output Summary (Last 24 hours) at 2022 1217  Last data filed at 2022 0557  Gross per 24 hour   Intake 310 ml   Output 625 ml   Net -315 ml       Intake/Output Summary (Last 24 hours) at 2022 1217  Last data filed at 2022 0557  Gross per 24 hour   Intake 310 ml   Output 625 ml   Net -315 ml              Physical Exam:  eneral appearance: Well, no apparent distress, appears stated age and cooperative. HEENT PERRLA EOMI   neck: Supple, No jugular venous distention/bruits. Trachea midline without thyromegaly or adenopathy with full range of motion. Lungs: Clear to ascultation, bilaterally without Rales/Wheezes/Rhonchi with good respiratory effort. Heart: Regular rate and rhythm with Normal S1/S2 without  murmurs, rubs or gallops, point of maximum impulse non-displaced  Abdomen: Soft, non-tender or non-distended without rigidity or guarding and positive bowel sounds all four quadrants. Extremities: No pitting edema   skin: Skin color, texture, turgor normal. No rashes or lesions. Neurologic: Alert and oriented X 3,  neurovascularly intact with sensory/motor intact upper extremities/lower extremities, bilaterally.  Cranial nerves:II-XII intact, grossly non-focal.  Mental status: Alert, oriented, thought content appropriate. Labs:  CBC:   Lab Results   Component Value Date/Time    WBC 6.9 08/19/2022 05:04 AM    RBC 3.28 08/19/2022 05:04 AM    HGB 9.4 08/19/2022 05:04 AM    HCT 28.5 08/19/2022 05:04 AM    MCV 86.9 08/19/2022 05:04 AM    MCH 28.7 08/19/2022 05:04 AM    MCHC 33.0 08/19/2022 05:04 AM    RDW 14.4 08/19/2022 05:04 AM     08/19/2022 05:04 AM    MPV 9.2 08/19/2022 05:04 AM     CMP:    Lab Results   Component Value Date/Time     08/19/2022 05:04 AM    K 4.2 08/19/2022 05:04 AM     08/19/2022 05:04 AM    CO2 23 08/19/2022 05:04 AM    BUN 31 08/19/2022 05:04 AM    CREATININE 2.2 08/19/2022 05:04 AM    GFRAA 35 08/19/2022 05:04 AM    LABGLOM 29 08/19/2022 05:04 AM    GLUCOSE 107 08/19/2022 05:04 AM    PROT 7.6 08/13/2022 03:20 PM    PROT 7.4 10/27/2011 11:18 AM    LABALBU 4.0 08/13/2022 03:20 PM    CALCIUM 9.0 08/19/2022 05:04 AM    BILITOT 0.3 08/13/2022 03:20 PM    ALKPHOS 74 08/13/2022 03:20 PM    AST 15 08/13/2022 03:20 PM    ALT 8 08/13/2022 03:20 PM     No results for input(s): TROPONINT in the last 72 hours.   Lab Results   Component Value Date/Time    TSHHS 0.015 08/17/2022 12:27 AM        Scheduled Med:   sennosides-docusate sodium  2 tablet Oral Daily    tamsulosin  0.4 mg Oral Daily    [Held by provider] levothyroxine  75 mcg Oral Daily    atorvastatin  10 mg Oral Daily    sodium chloride flush  5-40 mL IntraVENous 2 times per day    pantoprazole  40 mg Oral QAM AC    polyethylene glycol  17 g Oral Daily    sertraline  100 mg Oral Daily    QUEtiapine  12.5 mg Oral Nightly    heparin (porcine)  5,000 Units SubCUTAneous 3 times per day    vitamin B-12  1,000 mcg Oral Daily    ezetimibe  10 mg Oral Daily    metoprolol succinate  100 mg Oral Daily         Infusion :   sodium chloride                 Consultants:    IP CONSULT TO ONCOLOGY  IP CONSULT TO ENDOCRINOLOGY  IP CONSULT TO CARDIOLOGY  IP CONSULT TO NEUROLOGY  IP CONSULT TO PSYCHIATRY  IP CONSULT TO DIETITIAN  IP CONSULT TO NEPHROLOGY    Code Status: Full Code      I have explained to the patient and discussed with him/her the treatment plan. Electronically signed by Kapil Angel MD on 8/20/2022 at 12:17 PM    Time spent roughly >30 minute in interviewing patient performing medical examination, communicating with relevant medical staff and consultants including documentation .

## 2022-08-20 NOTE — PROGRESS NOTES
6 mm - No routine follow-up.   > than or equal to 6 mm - CT at 6-12 months to confirm persistence, then CT   every 2 years until 5 years. Solitary part-solid nodules   < 6 mm - No routine follow-up.   > than or equal to 6 mm - CT at 3-6 to confirm persistence. If unchanged and   solid component remains less than 6 mm, annual CT should be performed for 5   years. Multiple subsolid nodules   < 6 mm - CT at 3-6 months. If stable, consider CT at 2 and 4 years. > than or equal to 6 mm - CT at 3-6 months. Subsequent management based on   the most suspicious nodule(s). - Low risk patients include individuals with minimal or absent history of   smoking and other known risk factors. - High risk patients include individuals with a history or smoking or known   risk factors. Radiology 2017 http://pubs. rsna.org/doi/full/10.1148/radiol. 3993799909         CT HEAD WO CONTRAST   Final Result   No acute intracranial abnormality. NM MYOCARDIAL SPECT REST EXERCISE OR RX   Final Result      US RETROPERITONEAL LIMITED   Final Result   No acute abnormalities are identified to explain acute on chronic renal   failure.               Scheduled Medicines   Medications:    sennosides-docusate sodium  2 tablet Oral Daily    lidocaine  1 patch TransDERmal Once    tamsulosin  0.4 mg Oral Daily    [Held by provider] levothyroxine  75 mcg Oral Daily    atorvastatin  10 mg Oral Daily    sodium chloride flush  5-40 mL IntraVENous 2 times per day    pantoprazole  40 mg Oral QAM AC    polyethylene glycol  17 g Oral Daily    sertraline  100 mg Oral Daily    QUEtiapine  12.5 mg Oral Nightly    heparin (porcine)  5,000 Units SubCUTAneous 3 times per day    vitamin B-12  1,000 mcg Oral Daily    ezetimibe  10 mg Oral Daily    metoprolol succinate  100 mg Oral Daily      Infusions:    sodium chloride           Objective:   Vitals: BP (!) 142/63   Pulse 59   Temp 98.7 °F (37.1 °C) (Oral)   Resp 13   Ht 5' 11\" (1.803 m) Wt 146 lb 9.7 oz (66.5 kg)   SpO2 98%   BMI 20.45 kg/m²   General appearance: alert and cooperative with exam  Neck: no JVD or bruit  Thyroid : Normal lobes   Lungs: Has Vesicular Breath sounds   Heart:  regular rate and rhythm  Abdomen: soft, non-tender; bowel sounds normal; no masses,  no organomegaly  Musculoskeletal: Normal  Extremities: extremities normal, , no edema  Neurologic:  Awake, alert, oriented to name, place and time. Cranial nerves II-XII are grossly intact. Motor is  intact. Sensory is intact. ,  and gait is normal.    Assessment:     Patient Active Problem List:     Melena     GI bleed     Acute kidney injury superimposed on CKD (HCC)     Stage 3b chronic kidney disease (HCC)     Persistent proteinuria     Hypertensive heart and renal disease     History of anemia due to CKD     Acute anemia     Generalized weakness     HA (generalized anxiety disorder)     MDD (major depressive disorder), recurrent episode, moderate (HCC)     Heart palpitations     Tremors of nervous system     Peripheral polyneuropathy     Balance disorder     Thrombocytopenia ([address]      Plan:     Reviewed POC blood glucose . Labs and X ray results   Reviewed Current Medicines   Hold Synthroid for next few more days  Being seen by cardiology  Also consulted nephrology  Also consulted hematology for anemia and thrombocytopenia to be chronic  Will follow     .      Roni Calderon MD, MD

## 2022-08-20 NOTE — PROGRESS NOTES
Nephrology Progress Note        [address], [address] [address]  Phone: [phone]  Office Hours: 8:30AM - 4:30PM  Monday - Friday 8/20/2022 8:35 AM  Subjective:   Admit Date: 8/16/2022  PCP: Kasia Saucedo MD  Interval History:   Doing well on room air  BP at goal    Diet: ADULT DIET; Regular  ADULT ORAL NUTRITION SUPPLEMENT; Breakfast, Dinner; Standard High Calorie/High Protein Oral Supplement  ADULT ORAL NUTRITION SUPPLEMENT; Lunch, Dinner; Frozen Oral Supplement      Data:   Scheduled Meds:   sennosides-docusate sodium  2 tablet Oral Daily    lidocaine  1 patch TransDERmal Once    tamsulosin  0.4 mg Oral Daily    [Held by provider] levothyroxine  75 mcg Oral Daily    atorvastatin  10 mg Oral Daily    sodium chloride flush  5-40 mL IntraVENous 2 times per day    pantoprazole  40 mg Oral QAM AC    polyethylene glycol  17 g Oral Daily    sertraline  100 mg Oral Daily    QUEtiapine  12.5 mg Oral Nightly    heparin (porcine)  5,000 Units SubCUTAneous 3 times per day    vitamin B-12  1,000 mcg Oral Daily    ezetimibe  10 mg Oral Daily    metoprolol succinate  100 mg Oral Daily     Continuous Infusions:   sodium chloride       PRN Meds:sodium chloride flush, sodium chloride, ondansetron **OR** ondansetron, acetaminophen **OR** acetaminophen, sennosides, hydrOXYzine pamoate, hydrALAZINE  I/O last 3 completed shifts: In: 310 [P.O.:300; I.V.:10]  Out: 1100 [Urine:1100]  No intake/output data recorded.     Intake/Output Summary (Last 24 hours) at 8/20/2022 0835  Last data filed at 8/20/2022 0557  Gross per 24 hour   Intake 310 ml   Output 625 ml   Net -315 ml       CBC:   Recent Labs     08/18/22  0254 08/19/22  0504   WBC 6.6 6.9   HGB 8.9* 9.4*   * 129*       BMP:    Recent Labs     08/18/22  0254 08/19/22  0504    138   K 4.1 4.2    103   CO2 24 23   BUN 27* 31*   CREATININE 2.3* 2.2*   GLUCOSE 109* 107*         Objective:   Vitals: BP (!) 142/63   Pulse 59   Temp 98.7 °F (37.1 °C) (Oral)   Resp 13   Ht 5' 11\" (1.803 m)   Wt 146 lb 9.7 oz (66.5 kg)   SpO2 98%   BMI 20.45 kg/m²   General appearance: alert and cooperative with exam, in no acute distress  HEENT: normocephalic, atraumatic,   Neck: supple, trachea midline  Lungs: breathing comfortably on room air  Abdomen: non distended,   Neurologic: alert, oriented, follows commands, interactive    Assessment and Plan:   OLIVER on CKD4  Deconditioning  Hyperthyroidism  Tremors/dizziness/     Plan;  -Cr stable, will continue to monitor  -Monitor BP and adjust meds as needed, keep SBP around 140-150s//BP at goal  -Avoid nephrotoxins  -Continue supportive mgmt                    Electronically signed by She Lieberman DO on 8/20/2022 at 8:35 AM    [address]es DO [place] ,  [facility] [address]  PHONE: [phone]  FAX: [phone]

## 2022-08-21 LAB
T4 FREE: 0.98 NG/DL (ref 0.9–1.8)
TSH HIGH SENSITIVITY: 0.02 UIU/ML (ref 0.27–4.2)

## 2022-08-21 PROCEDURE — 36415 COLL VENOUS BLD VENIPUNCTURE: CPT

## 2022-08-21 PROCEDURE — 6370000000 HC RX 637 (ALT 250 FOR IP): Performed by: INTERNAL MEDICINE

## 2022-08-21 PROCEDURE — 84439 ASSAY OF FREE THYROXINE: CPT

## 2022-08-21 PROCEDURE — 6370000000 HC RX 637 (ALT 250 FOR IP): Performed by: NURSE PRACTITIONER

## 2022-08-21 PROCEDURE — 6370000000 HC RX 637 (ALT 250 FOR IP): Performed by: HOSPITALIST

## 2022-08-21 PROCEDURE — 84443 ASSAY THYROID STIM HORMONE: CPT

## 2022-08-21 PROCEDURE — 94761 N-INVAS EAR/PLS OXIMETRY MLT: CPT

## 2022-08-21 PROCEDURE — 2580000003 HC RX 258: Performed by: STUDENT IN AN ORGANIZED HEALTH CARE EDUCATION/TRAINING PROGRAM

## 2022-08-21 PROCEDURE — 1200000000 HC SEMI PRIVATE

## 2022-08-21 PROCEDURE — 6360000002 HC RX W HCPCS: Performed by: INTERNAL MEDICINE

## 2022-08-21 PROCEDURE — 6370000000 HC RX 637 (ALT 250 FOR IP): Performed by: STUDENT IN AN ORGANIZED HEALTH CARE EDUCATION/TRAINING PROGRAM

## 2022-08-21 RX ORDER — TRAMADOL HYDROCHLORIDE 50 MG/1
50 TABLET ORAL EVERY 6 HOURS PRN
Status: DISCONTINUED | OUTPATIENT
Start: 2022-08-21 | End: 2022-08-24 | Stop reason: HOSPADM

## 2022-08-21 RX ORDER — TRAZODONE HYDROCHLORIDE 50 MG/1
50 TABLET ORAL ONCE
Status: COMPLETED | OUTPATIENT
Start: 2022-08-21 | End: 2022-08-21

## 2022-08-21 RX ORDER — TRAZODONE HYDROCHLORIDE 50 MG/1
50 TABLET ORAL NIGHTLY
Status: DISCONTINUED | OUTPATIENT
Start: 2022-08-21 | End: 2022-08-23

## 2022-08-21 RX ADMIN — SERTRALINE 100 MG: 50 TABLET, FILM COATED ORAL at 09:30

## 2022-08-21 RX ADMIN — PANTOPRAZOLE SODIUM 40 MG: 40 TABLET, DELAYED RELEASE ORAL at 06:02

## 2022-08-21 RX ADMIN — SODIUM CHLORIDE, PRESERVATIVE FREE 10 ML: 5 INJECTION INTRAVENOUS at 09:31

## 2022-08-21 RX ADMIN — ATORVASTATIN CALCIUM 10 MG: 10 TABLET, FILM COATED ORAL at 09:29

## 2022-08-21 RX ADMIN — HEPARIN SODIUM 5000 UNITS: 5000 INJECTION INTRAVENOUS; SUBCUTANEOUS at 23:17

## 2022-08-21 RX ADMIN — TAMSULOSIN HYDROCHLORIDE 0.4 MG: 0.4 CAPSULE ORAL at 09:30

## 2022-08-21 RX ADMIN — SODIUM CHLORIDE, PRESERVATIVE FREE 10 ML: 5 INJECTION INTRAVENOUS at 21:08

## 2022-08-21 RX ADMIN — QUETIAPINE FUMARATE 12.5 MG: 25 TABLET ORAL at 21:08

## 2022-08-21 RX ADMIN — TRAMADOL HYDROCHLORIDE 50 MG: 50 TABLET, COATED ORAL at 21:08

## 2022-08-21 RX ADMIN — HEPARIN SODIUM 5000 UNITS: 5000 INJECTION INTRAVENOUS; SUBCUTANEOUS at 06:02

## 2022-08-21 RX ADMIN — TRAZODONE HYDROCHLORIDE 50 MG: 50 TABLET ORAL at 02:38

## 2022-08-21 RX ADMIN — TRAMADOL HYDROCHLORIDE 50 MG: 50 TABLET, COATED ORAL at 12:08

## 2022-08-21 RX ADMIN — CYANOCOBALAMIN TAB 1000 MCG 1000 MCG: 1000 TAB at 09:31

## 2022-08-21 RX ADMIN — EZETIMIBE 10 MG: 10 TABLET ORAL at 09:29

## 2022-08-21 RX ADMIN — SENNOSIDES AND DOCUSATE SODIUM 2 TABLET: 50; 8.6 TABLET ORAL at 09:30

## 2022-08-21 RX ADMIN — TRAZODONE HYDROCHLORIDE 50 MG: 50 TABLET ORAL at 21:08

## 2022-08-21 RX ADMIN — METOPROLOL SUCCINATE 100 MG: 50 TABLET, EXTENDED RELEASE ORAL at 09:31

## 2022-08-21 RX ADMIN — HEPARIN SODIUM 5000 UNITS: 5000 INJECTION INTRAVENOUS; SUBCUTANEOUS at 14:16

## 2022-08-21 ASSESSMENT — PAIN - FUNCTIONAL ASSESSMENT: PAIN_FUNCTIONAL_ASSESSMENT: ACTIVITIES ARE NOT PREVENTED

## 2022-08-21 ASSESSMENT — PAIN DESCRIPTION - ONSET: ONSET: GRADUAL

## 2022-08-21 ASSESSMENT — PAIN DESCRIPTION - LOCATION
LOCATION: RIB CAGE

## 2022-08-21 ASSESSMENT — PAIN DESCRIPTION - ORIENTATION
ORIENTATION: LEFT

## 2022-08-21 ASSESSMENT — PAIN DESCRIPTION - DESCRIPTORS
DESCRIPTORS: ACHING
DESCRIPTORS: DULL;ACHING;NAGGING
DESCRIPTORS: ACHING

## 2022-08-21 ASSESSMENT — PAIN DESCRIPTION - PAIN TYPE: TYPE: CHRONIC PAIN

## 2022-08-21 ASSESSMENT — PAIN SCALES - GENERAL
PAINLEVEL_OUTOF10: 4
PAINLEVEL_OUTOF10: 8
PAINLEVEL_OUTOF10: 0
PAINLEVEL_OUTOF10: 5
PAINLEVEL_OUTOF10: 6

## 2022-08-21 ASSESSMENT — PAIN DESCRIPTION - FREQUENCY: FREQUENCY: INTERMITTENT

## 2022-08-21 NOTE — PROGRESS NOTES
Internal Medicine Daily Progress Note @todate@                    Date of Admission: 8/16/2022  Allergies  Erythromycin and Pcn [penicillins]    Assessment/Plan    1. Generalized weakness at the present time patient continues to complain of generalized weakness patient has lost some weight since admission has lost about 4 pounds and states that he eats well. Patient consult has been requested along with calorie count. Possible disposition tomorrow there is no other evidence why he would be losing weight after that and eating properly. Patient is also somewhat anxious. 2.  Hypertension stable at the present time continue present management. 3.  OLIVER seems to be stable  4. Pain local left lower rib cage patient seems to be allergic to nonsteroidals few doses of tramadol for pain as needed. No obvious deformity or fractures noted. 5.  Loss of weight since admission patient is brought about 4 pounds however patient states that since few months he has lost about 20 pounds. Which I do not have any record at the present time. Calorie count has been requested. Patient has a history of depression     Although patient is clinically stable and can be discharged today however because of loss of weight and the need to make the calorie count will leave him for another day and discharge tomorrow. Patient is somewhat anxious going back to home as he lives by himself.   Medical care has been requested    Patient Active Problem List    Diagnosis Date Noted    Moderate malnutrition ([address] 08/19/2022    Tremors of nervous system 08/18/2022    Peripheral polyneuropathy 08/18/2022    Balance disorder 08/18/2022    Thrombocytopenia ([address] 08/18/2022    Generalized weakness 08/17/2022    HA (generalized anxiety disorder) 08/17/2022    MDD (major depressive disorder), recurrent episode, moderate ([address] 08/17/2022    Heart palpitations 08/17/2022    Acute anemia 08/16/2022    History of anemia due to CKD 2021    Stage 3b chronic kidney disease ([facility] 10/29/2020    Persistent proteinuria 10/29/2020    Hypertensive heart and renal disease 10/29/2020    Melena 2020    GI bleed 2020    Acute kidney injury superimposed on CKD ([facility] 2020                      Subjective:   No chief complaint on file. Mr. Beckford North of him having some rib pain I also on losing weight and would like to know why I am losing weight has been eating 3 meals a day however has lost 4pounds since admission. Overall patient states that he has lost about 20 pounds in the last 2 to 3 months. Objective:   TEMPERATURE:  Current - Temp: 98.4 °F (36.9 °C); Max - Temp  Av.4 °F (36.9 °C)  Min: 98.4 °F (36.9 °C)  Max: 98.4 °F (36.9 °C)  RESPIRATIONS RANGE: Resp  Av  Min: 12  Max: 12  PULSE RANGE: Pulse  Av  Min: 74  Max: 74  BLOOD PRESSURE RANGE:  Systolic (15FDD), BEY:617 , Min:146 , RUU:185   ; Diastolic (36FNP), XND:49, Min:65, Max:71    PULSE OXIMETRY RANGE: SpO2  Av %  Min: 95 %  Max: 98 %  24HR INTAKE/OUTPUT:    Intake/Output Summary (Last 24 hours) at 2022 1125  Last data filed at 2022 0150  Gross per 24 hour   Intake --   Output 300 ml   Net -300 ml       Intake/Output Summary (Last 24 hours) at 2022 1125  Last data filed at 2022 0150  Gross per 24 hour   Intake --   Output 300 ml   Net -300 ml              Physical Exam:  eneral appearance: Jesus comfortably no acute distress   HEENT PERRLA EOMI   neck: Supple, No jugular venous distention/bruits. Trachea midline without thyromegaly or adenopathy with full range of motion. Lungs: Clear to ascultation, bilaterally without Rales/Wheezes/Rhonchi with good respiratory effort.   Local tenderness on the left lower rib cage fractures no inflammation  Heart: Regular rate and rhythm with Normal S1/S2 without  murmurs, rubs or gallops, point of maximum impulse non-displaced  Abdomen: Soft, non-tender or non-distended without rigidity or guarding and positive bowel sounds all four quadrants. Extremities: No clubbing, cyanosis, or edema bilaterally. Full range of motion without deformity and normal gait intact. Skin: Skin color, texture, turgor normal. No rashes or lesions. Neurologic: Alert and oriented X 3,  neurovascularly intact with sensory/motor intact upper extremities/lower extremities, bilaterally. Cranial nerves:II-XII intact, grossly non-focal.  Mental status: Alert, oriented, thought content appropriate. Labs:  CBC:   Lab Results   Component Value Date/Time    WBC 6.9 08/19/2022 05:04 AM    RBC 3.28 08/19/2022 05:04 AM    HGB 9.4 08/19/2022 05:04 AM    HCT 28.5 08/19/2022 05:04 AM    MCV 86.9 08/19/2022 05:04 AM    MCH 28.7 08/19/2022 05:04 AM    MCHC 33.0 08/19/2022 05:04 AM    RDW 14.4 08/19/2022 05:04 AM     08/19/2022 05:04 AM    MPV 9.2 08/19/2022 05:04 AM     BMP:    Lab Results   Component Value Date/Time     08/19/2022 05:04 AM    K 4.2 08/19/2022 05:04 AM     08/19/2022 05:04 AM    CO2 23 08/19/2022 05:04 AM    BUN 31 08/19/2022 05:04 AM    LABALBU 4.0 08/13/2022 03:20 PM    CREATININE 2.2 08/19/2022 05:04 AM    CALCIUM 9.0 08/19/2022 05:04 AM    GFRAA 35 08/19/2022 05:04 AM    LABGLOM 29 08/19/2022 05:04 AM    GLUCOSE 107 08/19/2022 05:04 AM     No results for input(s): TROPONINT in the last 72 hours.   Lab Results   Component Value Date/Time    TSHHS 0.015 08/17/2022 12:27 AM        Scheduled Med:   traZODone  50 mg Oral Nightly    tamsulosin  0.4 mg Oral Daily    [Held by provider] levothyroxine  75 mcg Oral Daily    atorvastatin  10 mg Oral Daily    sodium chloride flush  5-40 mL IntraVENous 2 times per day    pantoprazole  40 mg Oral QAM AC    polyethylene glycol  17 g Oral Daily    sertraline  100 mg Oral Daily    QUEtiapine  12.5 mg Oral Nightly    heparin (porcine)  5,000 Units SubCUTAneous 3 times per day    vitamin B-12  1,000 mcg Oral Daily    ezetimibe  10 mg Oral Daily    metoprolol succinate 100 mg Oral Daily         Infusion :   sodium chloride                 Consultants:    IP CONSULT TO ONCOLOGY  IP CONSULT TO ENDOCRINOLOGY  IP CONSULT TO CARDIOLOGY  IP CONSULT TO NEUROLOGY  IP CONSULT TO PSYCHIATRY  IP CONSULT TO DIETITIAN  IP CONSULT TO NEPHROLOGY    Code Status: Full Code      I have explained to the patient and discussed with him/her the treatment plan. Electronically signed by Delvis Perez MD on 8/21/2022 at 11:25 AM    Time spent roughly >30 minute in interviewing patient performing medical examination, communicating with relevant medical staff and consultants including documentation .

## 2022-08-21 NOTE — PROGRESS NOTES
Pt agitated and stating he did not get his night time meds. I checked the [facility] and there were 2 meds given at 2217 and these were all that were ordered. Pt stated he takes 7 and then said 10 pills at night. I spoke with Pt nurse Marcie Johnson and he had already tried to explain to patient what meds had been given were the only meds ordered. Pt was not able to tell us what meds he takes at night that we might need to get ordered.

## 2022-08-21 NOTE — CARE COORDINATION
Patient on the weekend follow up list. EUGENEW reviewed the attending hospital doctor note dated 8/20 states that the patient is medically stable for discharge. EUGENEW sent perfect serve to the doctor asking if the patient can be discharged and to order home care.

## 2022-08-21 NOTE — PROGRESS NOTES
Progress Note( Dr. Ligia Sosa)  8/21/2022  Subjective:   Admit Date: 8/16/2022  PCP: Criselda Aguiar MD    Admitted For :Generalized weakness weight loss    Consulted For: Reviewed thyroid function test question of need adjustment    Interval History: Patient still feels tired has palpitations   But his palpitations much better heart rate is around 70+    Denies any chest pains,   Denies SOB . Denies nausea or vomiting. Not eating much  No new bowel or bladder symptoms. Intake/Output Summary (Last 24 hours) at 8/21/2022 0647  Last data filed at 8/21/2022 0150  Gross per 24 hour   Intake --   Output 300 ml   Net -300 ml         DATA    CBC:   Recent Labs     08/19/22  0504   WBC 6.9   HGB 9.4*   *      CMP:  Recent Labs     08/19/22  0504      K 4.2      CO2 23   BUN 31*   CREATININE 2.2*   CALCIUM 9.0       Lipids:   Lab Results   Component Value Date/Time    CHOL 116 06/26/2020 01:55 PM    HDL 48 06/26/2020 01:55 PM    TRIG 80 06/26/2020 01:55 PM     Glucose:  Recent Labs     08/19/22  0538   POCGLU 101*       [id number]:  Lab Results   Component Value Date/Time    LABA1C 5.2 08/18/2022 02:54 AM     High Sensitivity TSH:   Lab Results   Component Value Date/Time    TSHHS 0.015 08/17/2022 12:27 AM     Free T3:   Lab Results   Component Value Date/Time    FT3 2.1 08/17/2022 12:27 AM     Free T4:  Lab Results   Component Value Date/Time    T4FREE 1.25 08/17/2022 12:27 AM       CT CHEST WO CONTRAST   Preliminary Result   No acute cardiopulmonary findings. Unchanged ground-glass nodules within the left upper lobe measuring up to   approximately 7 mm. Findings are similar dating back to 11/30/2021. Follow-up recommendations are listed below.       RECOMMENDATIONS:   Fleischner Society guidelines for follow-up and management of incidentally   detected subsolid pulmonary nodules:      Solitary ground glass nodule   < 6 mm - No routine follow-up.   > than or equal to 6 mm - CT at 6-12 months to confirm persistence, then CT   every 2 years until 5 years. Solitary part-solid nodules   < 6 mm - No routine follow-up.   > than or equal to 6 mm - CT at 3-6 to confirm persistence. If unchanged and   solid component remains less than 6 mm, annual CT should be performed for 5   years. Multiple subsolid nodules   < 6 mm - CT at 3-6 months. If stable, consider CT at 2 and 4 years. > than or equal to 6 mm - CT at 3-6 months. Subsequent management based on   the most suspicious nodule(s). - Low risk patients include individuals with minimal or absent history of   smoking and other known risk factors. - High risk patients include individuals with a history or smoking or known   risk factors. Radiology 2017 http://pubs. rsna.org/doi/full/10.1148/radiol. 1760922394         CT HEAD WO CONTRAST   Final Result   No acute intracranial abnormality. NM MYOCARDIAL SPECT REST EXERCISE OR RX   Final Result      US RETROPERITONEAL LIMITED   Final Result   No acute abnormalities are identified to explain acute on chronic renal   failure.               Scheduled Medicines   Medications:    traZODone  50 mg Oral Nightly    sennosides-docusate sodium  2 tablet Oral Daily    tamsulosin  0.4 mg Oral Daily    [Held by provider] levothyroxine  75 mcg Oral Daily    atorvastatin  10 mg Oral Daily    sodium chloride flush  5-40 mL IntraVENous 2 times per day    pantoprazole  40 mg Oral QAM AC    polyethylene glycol  17 g Oral Daily    sertraline  100 mg Oral Daily    QUEtiapine  12.5 mg Oral Nightly    heparin (porcine)  5,000 Units SubCUTAneous 3 times per day    vitamin B-12  1,000 mcg Oral Daily    ezetimibe  10 mg Oral Daily    metoprolol succinate  100 mg Oral Daily      Infusions:    sodium chloride           Objective:   Vitals: BP (!) 147/71   Pulse 59   Temp 98.4 °F (36.9 °C) (Oral)   Resp 13   Ht 5' 11\" (1.803 m)   Wt 146 lb 9.7 oz (66.5 kg)   SpO2 98%   BMI 20.45 kg/m²   General appearance:

## 2022-08-22 PROCEDURE — 6370000000 HC RX 637 (ALT 250 FOR IP): Performed by: HOSPITALIST

## 2022-08-22 PROCEDURE — 6370000000 HC RX 637 (ALT 250 FOR IP): Performed by: NURSE PRACTITIONER

## 2022-08-22 PROCEDURE — 94761 N-INVAS EAR/PLS OXIMETRY MLT: CPT

## 2022-08-22 PROCEDURE — 6370000000 HC RX 637 (ALT 250 FOR IP): Performed by: INTERNAL MEDICINE

## 2022-08-22 PROCEDURE — 6360000002 HC RX W HCPCS: Performed by: INTERNAL MEDICINE

## 2022-08-22 PROCEDURE — 6370000000 HC RX 637 (ALT 250 FOR IP): Performed by: STUDENT IN AN ORGANIZED HEALTH CARE EDUCATION/TRAINING PROGRAM

## 2022-08-22 PROCEDURE — 97530 THERAPEUTIC ACTIVITIES: CPT

## 2022-08-22 PROCEDURE — 99232 SBSQ HOSP IP/OBS MODERATE 35: CPT | Performed by: INTERNAL MEDICINE

## 2022-08-22 PROCEDURE — 97535 SELF CARE MNGMENT TRAINING: CPT

## 2022-08-22 PROCEDURE — 97116 GAIT TRAINING THERAPY: CPT

## 2022-08-22 PROCEDURE — 2580000003 HC RX 258: Performed by: STUDENT IN AN ORGANIZED HEALTH CARE EDUCATION/TRAINING PROGRAM

## 2022-08-22 PROCEDURE — 97110 THERAPEUTIC EXERCISES: CPT

## 2022-08-22 PROCEDURE — 1200000000 HC SEMI PRIVATE

## 2022-08-22 RX ADMIN — QUETIAPINE FUMARATE 12.5 MG: 25 TABLET ORAL at 20:28

## 2022-08-22 RX ADMIN — SODIUM CHLORIDE, PRESERVATIVE FREE 10 ML: 5 INJECTION INTRAVENOUS at 20:29

## 2022-08-22 RX ADMIN — TAMSULOSIN HYDROCHLORIDE 0.4 MG: 0.4 CAPSULE ORAL at 08:22

## 2022-08-22 RX ADMIN — SODIUM CHLORIDE, PRESERVATIVE FREE 10 ML: 5 INJECTION INTRAVENOUS at 08:24

## 2022-08-22 RX ADMIN — HEPARIN SODIUM 5000 UNITS: 5000 INJECTION INTRAVENOUS; SUBCUTANEOUS at 05:40

## 2022-08-22 RX ADMIN — HEPARIN SODIUM 5000 UNITS: 5000 INJECTION INTRAVENOUS; SUBCUTANEOUS at 13:41

## 2022-08-22 RX ADMIN — ATORVASTATIN CALCIUM 10 MG: 10 TABLET, FILM COATED ORAL at 08:22

## 2022-08-22 RX ADMIN — CYANOCOBALAMIN TAB 1000 MCG 1000 MCG: 1000 TAB at 08:22

## 2022-08-22 RX ADMIN — EZETIMIBE 10 MG: 10 TABLET ORAL at 08:22

## 2022-08-22 RX ADMIN — TRAZODONE HYDROCHLORIDE 50 MG: 50 TABLET ORAL at 20:28

## 2022-08-22 RX ADMIN — METOPROLOL SUCCINATE 100 MG: 50 TABLET, EXTENDED RELEASE ORAL at 08:22

## 2022-08-22 RX ADMIN — PANTOPRAZOLE SODIUM 40 MG: 40 TABLET, DELAYED RELEASE ORAL at 05:40

## 2022-08-22 RX ADMIN — HEPARIN SODIUM 5000 UNITS: 5000 INJECTION INTRAVENOUS; SUBCUTANEOUS at 20:29

## 2022-08-22 RX ADMIN — SERTRALINE 100 MG: 50 TABLET, FILM COATED ORAL at 08:22

## 2022-08-22 ASSESSMENT — ENCOUNTER SYMPTOMS
VOMITING: 0
BACK PAIN: 0
NAUSEA: 0
WHEEZING: 0
SHORTNESS OF BREATH: 0
COUGH: 0
SORE THROAT: 0

## 2022-08-22 NOTE — PROGRESS NOTES
V2.0  [facility] Hospitalist Progress Note      Name:  Jerilyn Carson /Age/Sex: 1935  (81 y.o. male)   MRN & CSN:  [id number] & [id number] Encounter Date/Time: 2022 11:45 AM EDT    Location:  [facility] PCP: Dax Lujan MD       Hospital Day: 7    Assessment and Plan:   Jerilyn Carson is a 81 y.o. male  presents with Acute anemia      Plan:    Generalized weakness   -patient continues to complain of generalized weakness patient has lost some weight since admission has lost about 4 pounds and states that he eats well. Imaging and findings have been negative for any etiology. Dietitian consult in place for calorie count. Patient is somewhat anxious as he does not feel continue to resolve her home his condition. Social work has been consulted for SNF placement. 2.  Hypertension   -Continue home meds    3. OLIVER   -Creatinine now stable. 4.  Pain local   -left lower rib cage patient seems to be allergic to nonsteroidals few doses of tramadol for pain as needed. No obvious deformity or fractures noted. 5.  Moderate malnutrition  Dietitian consult placed. Calorie count in place. 6.  Anemia, thrombocytopenia  Factorial in the setting of chronic disease, poor nutritional status. Hematology is been consulted. No plan for bone marrow biopsy. Follow-up with outpatient. Constipation  Having regular bowel movements now. Is on a scheduled regimen. hypoThyroidism  -TSH is suppressed. Endocrinology consult in place. Holding levothyroxine for a few days. May need reduced dose on discharge. Diet ADULT DIET;  Regular  ADULT ORAL NUTRITION SUPPLEMENT; Breakfast, Dinner; Standard High Calorie/High Protein Oral Supplement  ADULT ORAL NUTRITION SUPPLEMENT; Lunch, Dinner; Frozen Oral Supplement   DVT Prophylaxis [] Lovenox, [x]  Heparin, [] SCDs, [] Ambulation,  [] Eliquis, [] Xarelto  [] Coumadin   Code Status Full Code   Disposition From: Home  Expected Disposition: SNF  Estimated Date of Discharge: Likely stable for discharge pending SNF basement. Surrogate Decision Maker/ POMICAELA Jenny Castro child     Subjective:     Chief Complaint: Weakness     Ports he feels okay but still weak. Reports he has been eating 100% of her meals and had a grilled chicken sandwich yesterday for dinner. Denies any chest pain, shortness of breath, nausea, vomiting, dizziness. Review of Systems:    Review of Systems   Constitutional:  Negative for chills and fever. HENT:  Negative for sore throat. Eyes:  Negative for visual disturbance. Respiratory:  Negative for cough, shortness of breath and wheezing. Cardiovascular:  Negative for palpitations and leg swelling. Gastrointestinal:  Negative for nausea and vomiting. Endocrine: Negative for polyuria. Genitourinary:  Negative for dysuria. Musculoskeletal:  Negative for back pain. Skin:  Negative for wound. Neurological:  Negative for dizziness and weakness. Psychiatric/Behavioral:  Negative for confusion. Objective: Intake/Output Summary (Last 24 hours) at 8/22/2022 1145  Last data filed at 8/22/2022 3205  Gross per 24 hour   Intake 10 ml   Output 300 ml   Net -290 ml        Vitals:   Vitals:    08/22/22 0822   BP:    Pulse: 62   Resp:    Temp:    SpO2:        Physical Exam:   Physical Exam  Constitutional:       General: He is not in acute distress. HENT:      Mouth/Throat:      Mouth: Mucous membranes are moist.      Pharynx: No posterior oropharyngeal erythema. Eyes:      General: No scleral icterus. Extraocular Movements: Extraocular movements intact. Pupils: Pupils are equal, round, and reactive to light. Cardiovascular:      Rate and Rhythm: Normal rate and regular rhythm. Pulses: Normal pulses. Heart sounds: No murmur heard. Pulmonary:      Effort: Pulmonary effort is normal.      Breath sounds: No wheezing or rales. Abdominal:      General: Bowel sounds are normal. There is no distension. Palpations: Abdomen is soft. Tenderness: There is no abdominal tenderness. Musculoskeletal:         General: Normal range of motion. Right lower leg: No edema. Left lower leg: No edema. Skin:     General: Skin is warm. Findings: No rash. Neurological:      General: No focal deficit present. Mental Status: He is alert and oriented to person, place, and time. Cranial Nerves: No cranial nerve deficit. Sensory: No sensory deficit. Motor: No weakness. Psychiatric:         Mood and Affect: Mood normal.       Medications:   Medications:    traZODone  50 mg Oral Nightly    tamsulosin  0.4 mg Oral Daily    [Held by provider] levothyroxine  75 mcg Oral Daily    atorvastatin  10 mg Oral Daily    sodium chloride flush  5-40 mL IntraVENous 2 times per day    pantoprazole  40 mg Oral QAM AC    polyethylene glycol  17 g Oral Daily    sertraline  100 mg Oral Daily    QUEtiapine  12.5 mg Oral Nightly    heparin (porcine)  5,000 Units SubCUTAneous 3 times per day    vitamin B-12  1,000 mcg Oral Daily    ezetimibe  10 mg Oral Daily    metoprolol succinate  100 mg Oral Daily      Infusions:    sodium chloride       PRN Meds: traMADol, 50 mg, Q6H PRN  sodium chloride flush, 5-40 mL, PRN  sodium chloride, , PRN  ondansetron, 4 mg, Q8H PRN   Or  ondansetron, 4 mg, Q6H PRN  acetaminophen, 650 mg, Q6H PRN   Or  acetaminophen, 650 mg, Q6H PRN  sennosides, 5 mL, BID PRN  hydrOXYzine pamoate, 25 mg, Daily PRN  hydrALAZINE, 10 mg, Q6H PRN        Labs      Recent Results (from the past 24 hour(s))   T4, Free    Collection Time: 08/21/22  8:48 PM   Result Value Ref Range    T4 Free 0.98 0.9 - 1.8 NG/DL   TSH    Collection Time: 08/21/22  8:48 PM   Result Value Ref Range    TSH, High Sensitivity 0.019 (L) 0.270 - 4.20 uIu/ml        Imaging/Diagnostics Last 24 Hours   No results found.     Electronically signed by Paul Sanchez MD on 8/22/2022 at 11:45 AM

## 2022-08-22 NOTE — PROGRESS NOTES
Physician Progress Note      Dustin Shine  [facility] #:                  [id number]  :                       1935  ADMIT DATE:       2022 11:17 PM  DISCH DATE:  RESPONDING  PROVIDER #:        Josefina Sagastume MD          QUERY TEXT:    Hospitalists,    Pt admitted with  weakness, constipation, OLIVER and noted documentation of    moderate malnutrition by the Dietitian consultant. If possible, please   document in progress notes and discharge summary:    The medical record reflects the following:  Risk Factors: debility    Clinical Indicators: Malnutrition Assessment:  Malnutrition Status: Moderate malnutrition (22 1235)  Context:  Chronic Illness  Findings of the 6 clinical characteristics of malnutrition:  Energy Intake:  75% or less estimated energy requirements for 1 month or   longer  Weight Loss:  Mild weight loss (specify amount and time period) (progressive   loss with 10% in past year)  Body Fat Loss:  Mild body fat loss Orbital, Buccal region  Muscle Mass Loss:  Mild muscle mass loss Temples (temporalis), Clavicles   (pectoralis & deltoids)    Treatment: Dietitian consult, labs, I&O, ONS    Thank you,  Patricia Anders RN [facility]  [phone]  Options provided:  -- Moderate malnutrition confirmed present on admission  -- Moderate malnutrition confirmed not present on admission  -- Moderate malnutrition ruled out  -- Other - I will add my own diagnosis  -- Disagree - Not applicable / Not valid  -- Disagree - Clinically unable to determine / Unknown  -- Refer to Clinical Documentation Reviewer    PROVIDER RESPONSE TEXT:    The diagnosis of moderate malnutrition was confirmed as present on admission.     Query created by: Carol Lucas on 2022 8:16 AM      Electronically signed by:  Josefina Sagastume MD 2022 9:43 AM

## 2022-08-22 NOTE — CONSULTS
Comprehensive Nutrition Assessment    Type and Reason for Visit:  Reassess, Consult    Nutrition Recommendations/Plan:   Continue current diet and supplement regimen as ordered  Currently supplement regimen providing ~1280 kcal and 58 g protein if consumed at 100%  Please document all po intake so that patient's nutrition status can be accurately assessed  Will closely monitor po intake, weight trends, poc     Malnutrition Assessment:  Malnutrition Status: Moderate malnutrition (08/19/22 1235)    Context:  Chronic Illness     Nutrition Assessment:    Pt currently on regular diet with frozen and standard oral nutrition supplements ordered, no po intake documented in the past 5 days per flowsheet, dietitian consult for poor po intake/appetite 5 or more days, spoke with bedside RN who reports that pt ate 100% of lunch today, per order ticket lunch provided ~1274 kcal (not including supplement), RN agreeable to document po intake, mlnourished, will continue to follow at high nutrition risk    Nutrition Related Findings:    sitting up in chair visiting with nephew, lunch tray present ate more than 50% of meal Wound Type:  (heel wound noted)       Current Nutrition Intake & Therapies:    Average Meal Intake: Unable to assess  Average Supplements Intake: Unable to assess  ADULT DIET; Regular  ADULT ORAL NUTRITION SUPPLEMENT; Breakfast, Dinner; Standard High Calorie/High Protein Oral Supplement  ADULT ORAL NUTRITION SUPPLEMENT; Lunch, Dinner; Frozen Oral Supplement    Anthropometric Measures:  Height: 5' 10.98\" (180.3 cm)  Ideal Body Weight (IBW): 172 lbs (78 kg)    Admission Body Weight: 151 lb 3.8 oz (68.6 kg) (first measured weight)  Current Body Weight: 146 lb 9.7 oz (66.5 kg), 85.2 % IBW.  Weight Source: Bed Scale  Current BMI (kg/m2): 20.5  Usual Body Weight: 158 lb (71.7 kg) (last september, 2019 wt 172#)  % Weight Change (Calculated): -10  Weight Adjustment For: No Adjustment  BMI Categories: Underweight (BMI less than 25) age over 72    Estimated Daily Nutrient Needs:  Energy Requirements Based On: Kcal/kg  Weight Used for Energy Requirements: Current  Energy (kcal/day): 9582-4429 (25-30 olive/kg)  Weight Used for Protein Requirements: Current  Protein (g/day): 64-77 (1-1.2 g/kg)  Method Used for Fluid Requirements: 1 ml/kcal  Fluid (ml/day): 1900    Nutrition Diagnosis:   Moderate malnutrition, In context of chronic illness related to other (comment), psychological cause or life stress, altered GI function (reduced appetite in illness) as evidenced by constipation, poor intake prior to admission, weight loss, BMI    Nutrition Interventions:   Food and/or Nutrient Delivery: Continue Current Diet, Continue Oral Nutrition Supplement  Nutrition Education/Counseling: No recommendation at this time  Coordination of Nutrition Care: Continue to monitor while inpatient  Plan of Care discussed with: RN    Goals:  Previous Goal Met: Progressing toward Goal(s)  Goals: PO intake 75% or greater, by next RD assessment    Nutrition Monitoring and Evaluation:   Behavioral-Environmental Outcomes: None Identified  Food/Nutrient Intake Outcomes: Food and Nutrient Intake, Supplement Intake  Physical Signs/Symptoms Outcomes: Biochemical Data, GI Status, Hemodynamic Status, Fluid Status or Edema, Weight, Skin, Meal Time Behavior    Discharge Planning:     Too soon to determine     Zari Tran [address], [address], [place]  Contact: [phone]

## 2022-08-22 NOTE — PROGRESS NOTES
Independent  Homemaking Assistance: Independent  Active : Yes        Short Term Goals  Time Frame for Short term goals: 1 week  Short term goal 1: Pt will perform STS from standard surface to RW with SBA and cues  Short term goal 2: Pt will AMB x75 ft continuously with LRAD and SBA  Short term goal 3: Pt will perform x5 STS in 30 seconds for endurance, standard chair, SBA  Short term goal 4: Pt will perform SPT with RW and SBA    Electronically signed by:    Cari Wong PTA  8/22/2022, 8:21 AM

## 2022-08-22 NOTE — PLAN OF CARE
Problem: Discharge Planning  Goal: Discharge to home or other facility with appropriate resources  8/22/2022 0106 by Betty Wang LPN  Outcome: Progressing  8/21/2022 1111 by Shahrzad Bauman RN  Outcome: Progressing     Problem: Pain  Goal: Verbalizes/displays adequate comfort level or baseline comfort level  8/22/2022 0106 by Betty Wang LPN  Outcome: Progressing  8/21/2022 1111 by Shahrzad Bauman RN  Outcome: Progressing     Problem: ABCDS Injury Assessment  Goal: Absence of physical injury  8/22/2022 0106 by Betty Wang LPN  Outcome: Progressing  8/21/2022 1111 by Shahrzad Bauman RN  Outcome: Progressing     Problem: Safety - Adult  Goal: Free from fall injury  8/22/2022 0106 by Betty Wang LPN  Outcome: Progressing  8/21/2022 1111 by Shahrzad Bauman RN  Outcome: Progressing     Problem: Nutrition Deficit:  Goal: Optimize nutritional status  8/22/2022 0106 by Betty Wang LPN  Outcome: Progressing  8/21/2022 1111 by Shahrzad Bauman RN  Outcome: Progressing     Problem: Skin/Tissue Integrity  Goal: Absence of new skin breakdown  Description: 1. Monitor for areas of redness and/or skin breakdown  2. Assess vascular access sites hourly  3. Every 4-6 hours minimum:  Change oxygen saturation probe site  4. Every 4-6 hours:  If on nasal continuous positive airway pressure, respiratory therapy assess nares and determine need for appliance change or resting period.   8/22/2022 0106 by Betty Wang LPN  Outcome: Progressing  8/21/2022 1111 by Shahrzad Bauman RN  Outcome: Progressing

## 2022-08-22 NOTE — PROGRESS NOTES
Nephrology Progress Note        [address], [address] [address]  Phone: [phone]  Office Hours: 8:30AM - 4:30PM  Monday - Friday 8/22/2022 6:55 AM  Subjective:   Admit Date: 8/16/2022  PCP: Sana Sanders MD  Interval History:   Resting   BP controlled    Diet: ADULT DIET;  Regular  ADULT ORAL NUTRITION SUPPLEMENT; Breakfast, Dinner; Standard High Calorie/High Protein Oral Supplement  ADULT ORAL NUTRITION SUPPLEMENT; Lunch, Dinner; Frozen Oral Supplement      Data:   Scheduled Meds:   traZODone  50 mg Oral Nightly    tamsulosin  0.4 mg Oral Daily    [Held by provider] levothyroxine  75 mcg Oral Daily    atorvastatin  10 mg Oral Daily    sodium chloride flush  5-40 mL IntraVENous 2 times per day    pantoprazole  40 mg Oral QAM AC    polyethylene glycol  17 g Oral Daily    sertraline  100 mg Oral Daily    QUEtiapine  12.5 mg Oral Nightly    heparin (porcine)  5,000 Units SubCUTAneous 3 times per day    vitamin B-12  1,000 mcg Oral Daily    ezetimibe  10 mg Oral Daily    metoprolol succinate  100 mg Oral Daily     Continuous Infusions:   sodium chloride         Objective:   Vitals: /67   Pulse 55   Temp 98.3 °F (36.8 °C) (Oral)   Resp 15   Ht 5' 11\" (1.803 m)   Wt 146 lb 9.7 oz (66.5 kg)   SpO2 95%   BMI 20.45 kg/m²   General appearance: in no acute distress  HEENT: normocephalic, atraumatic,   Neck: supple, trachea midline  Lungs: breathing comfortably   Extremities: extremities atraumatic, no cyanosis or edema      Assessment and Plan:     Patient Active Problem List    Diagnosis Date Noted    Moderate malnutrition ([facility] 08/19/2022    Tremors of nervous system 08/18/2022    Peripheral polyneuropathy 08/18/2022    Balance disorder 08/18/2022    Thrombocytopenia ([facility] 08/18/2022    Generalized weakness 08/17/2022    HA (generalized anxiety disorder) 08/17/2022    MDD (major depressive disorder), recurrent episode, moderate (HCC) 08/17/2022    Heart palpitations 08/17/2022    Acute anemia 08/16/2022    History of anemia due to CKD 09/14/2021    Stage 3b chronic kidney disease ([facility] 10/29/2020    Persistent proteinuria 10/29/2020    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

## 2022-08-22 NOTE — CARE COORDINATION
CM reviewed notes to continue to discuss discharge plans. Pt shared that he does not believe he can go home alone. Pt shared that he would like to go to [facility]. CM called Amrita Baldwin at [facility] with referral per . LH  1250 CM called Amrita Baldwin and pt approved and precert started.  [address]

## 2022-08-22 NOTE — PROGRESS NOTES
Occupational Therapy    Occupational Therapy Treatment Note    Name: Rossana Gaona MRN: [id number] :   1935   Date:  2022   Admission Date: 2022 Room:  [address] [place] [address]-A     Primary Problem:   Acute anemia     Restrictions/Precautions: General Precautions, Fall Risk    Communication with other providers: RN approved session. Subjective:  Patient states:  \"I am supposed to be getting out of here today. \"   Pain:   Location, Type, Intensity (0/10 to 10/10):  denies     Objective:    Observation: Pt presented in low 's, agreeable to session. Objective Measures:  Tele     Treatment, including education:  Therapeutic Activity Training:   Therapeutic activity training was instructed today. Cues were given for safety, sequence, UE/LE placement, awareness, and balance. Activities performed today included bed mobility training, sup-sit, sit-stand, SPT. Supine to Sit with SUP. Static Sitting balance with SUP. Sit to Stand transfer from recliner X1 trial and EOB X2 trials with use of FWW with SBA/CGA with max cues for UE placement d/t pt pulling up on FWW. SPT from EOB <> recliner with SBA. Functional mobility for short and moderate HH distances including EOB to Bathroom and in hallway for 40'+40' with use of FWW with min cues for safety d/t pt lifting FWW to turn at times. Self Care Training:   Cues were given for safety, sequence, UE/LE placement, visual cues, and balance. Activities performed today included dressing, toileting, hand hygiene, and grooming. Dressing:  Pt doffed/donned pull-up with SBA while seated on toilet. Pt doffed/donned gown with SUP while seated. Toileting: Toilet t/f on/off toilet with use of FWW for X2 trials with CGA. Pt able to manage pull-up over hips in standing with SBA. SBA for ifrah hygiene in standing. Hygiene/grooming  Pt completed UB hygiene while seated with use of wash cloth all with SUP.    Pt stood at sink to brush teeth, wash hands, and shave with electric razor all with SBA/CGA while standing at sink for ~6 min. Pt agreeable to sitting up in recliner at end of session. Patient educated on role of OT , benefits of OT and rationale for therapeutic intervention. Educated on need to be patient advocate, benefit of OOB activity. Pt left with call light within reach, chair alarm on, and all needs met. Assessment / Impression:    Patient's tolerance of treatment: Good  Adverse Reaction: none  Significant change in status and impact:  none  Barriers to improvement: none       Plan for Next Session:    Cont OT POC    Time in:  0845  Time out:  0924  Timed treatment minutes:  39  Total treatment time:  39      Electronically signed by:     ALVERTO Chen,   8/22/2022, 9:23 AM

## 2022-08-23 LAB
ANION GAP SERPL CALCULATED.3IONS-SCNC: 10 MMOL/L (ref 4–16)
BUN BLDV-MCNC: 59 MG/DL (ref 6–23)
CALCIUM SERPL-MCNC: 9.3 MG/DL (ref 8.3–10.6)
CHLORIDE BLD-SCNC: 102 MMOL/L (ref 99–110)
CO2: 27 MMOL/L (ref 21–32)
CREAT SERPL-MCNC: 2.5 MG/DL (ref 0.9–1.3)
GFR AFRICAN AMERICAN: 30 ML/MIN/1.73M2
GFR NON-AFRICAN AMERICAN: 25 ML/MIN/1.73M2
GLUCOSE BLD-MCNC: 116 MG/DL (ref 70–99)
POTASSIUM SERPL-SCNC: 4.7 MMOL/L (ref 3.5–5.1)
SODIUM BLD-SCNC: 139 MMOL/L (ref 135–145)

## 2022-08-23 PROCEDURE — 6370000000 HC RX 637 (ALT 250 FOR IP): Performed by: STUDENT IN AN ORGANIZED HEALTH CARE EDUCATION/TRAINING PROGRAM

## 2022-08-23 PROCEDURE — 2580000003 HC RX 258: Performed by: STUDENT IN AN ORGANIZED HEALTH CARE EDUCATION/TRAINING PROGRAM

## 2022-08-23 PROCEDURE — 1200000000 HC SEMI PRIVATE

## 2022-08-23 PROCEDURE — 99232 SBSQ HOSP IP/OBS MODERATE 35: CPT | Performed by: NURSE PRACTITIONER

## 2022-08-23 PROCEDURE — 6370000000 HC RX 637 (ALT 250 FOR IP): Performed by: NURSE PRACTITIONER

## 2022-08-23 PROCEDURE — 6370000000 HC RX 637 (ALT 250 FOR IP): Performed by: INTERNAL MEDICINE

## 2022-08-23 PROCEDURE — 6360000002 HC RX W HCPCS: Performed by: INTERNAL MEDICINE

## 2022-08-23 PROCEDURE — 80048 BASIC METABOLIC PNL TOTAL CA: CPT

## 2022-08-23 PROCEDURE — 36415 COLL VENOUS BLD VENIPUNCTURE: CPT

## 2022-08-23 PROCEDURE — 94761 N-INVAS EAR/PLS OXIMETRY MLT: CPT

## 2022-08-23 RX ORDER — TRAZODONE HYDROCHLORIDE 50 MG/1
100 TABLET ORAL NIGHTLY
Status: DISCONTINUED | OUTPATIENT
Start: 2022-08-23 | End: 2022-08-24 | Stop reason: HOSPADM

## 2022-08-23 RX ORDER — HYDROXYZINE PAMOATE 25 MG/1
25 CAPSULE ORAL DAILY PRN
Status: DISCONTINUED | OUTPATIENT
Start: 2022-08-23 | End: 2022-08-24 | Stop reason: HOSPADM

## 2022-08-23 RX ADMIN — QUETIAPINE FUMARATE 12.5 MG: 25 TABLET ORAL at 21:22

## 2022-08-23 RX ADMIN — TAMSULOSIN HYDROCHLORIDE 0.4 MG: 0.4 CAPSULE ORAL at 09:45

## 2022-08-23 RX ADMIN — HEPARIN SODIUM 5000 UNITS: 5000 INJECTION INTRAVENOUS; SUBCUTANEOUS at 06:20

## 2022-08-23 RX ADMIN — HEPARIN SODIUM 5000 UNITS: 5000 INJECTION INTRAVENOUS; SUBCUTANEOUS at 21:23

## 2022-08-23 RX ADMIN — EZETIMIBE 10 MG: 10 TABLET ORAL at 09:44

## 2022-08-23 RX ADMIN — HEPARIN SODIUM 5000 UNITS: 5000 INJECTION INTRAVENOUS; SUBCUTANEOUS at 13:24

## 2022-08-23 RX ADMIN — SODIUM CHLORIDE, PRESERVATIVE FREE 10 ML: 5 INJECTION INTRAVENOUS at 21:23

## 2022-08-23 RX ADMIN — POLYETHYLENE GLYCOL (3350) 17 G: 17 POWDER, FOR SOLUTION ORAL at 09:44

## 2022-08-23 RX ADMIN — METOPROLOL SUCCINATE 100 MG: 50 TABLET, EXTENDED RELEASE ORAL at 09:44

## 2022-08-23 RX ADMIN — TRAMADOL HYDROCHLORIDE 50 MG: 50 TABLET, COATED ORAL at 21:22

## 2022-08-23 RX ADMIN — ATORVASTATIN CALCIUM 10 MG: 10 TABLET, FILM COATED ORAL at 09:44

## 2022-08-23 RX ADMIN — PANTOPRAZOLE SODIUM 40 MG: 40 TABLET, DELAYED RELEASE ORAL at 06:20

## 2022-08-23 RX ADMIN — CYANOCOBALAMIN TAB 1000 MCG 1000 MCG: 1000 TAB at 09:45

## 2022-08-23 RX ADMIN — SODIUM CHLORIDE, PRESERVATIVE FREE 10 ML: 5 INJECTION INTRAVENOUS at 09:50

## 2022-08-23 RX ADMIN — TRAZODONE HYDROCHLORIDE 100 MG: 50 TABLET ORAL at 21:22

## 2022-08-23 RX ADMIN — SERTRALINE 100 MG: 50 TABLET, FILM COATED ORAL at 09:45

## 2022-08-23 ASSESSMENT — ENCOUNTER SYMPTOMS
BACK PAIN: 0
VOMITING: 0
COUGH: 0
SHORTNESS OF BREATH: 0
NAUSEA: 0
SORE THROAT: 0
WHEEZING: 0

## 2022-08-23 ASSESSMENT — PAIN DESCRIPTION - DESCRIPTORS
DESCRIPTORS: DULL;ACHING
DESCRIPTORS: ACHING;NAGGING

## 2022-08-23 ASSESSMENT — PAIN SCALES - GENERAL
PAINLEVEL_OUTOF10: 6
PAINLEVEL_OUTOF10: 0
PAINLEVEL_OUTOF10: 5
PAINLEVEL_OUTOF10: 5

## 2022-08-23 ASSESSMENT — PAIN - FUNCTIONAL ASSESSMENT: PAIN_FUNCTIONAL_ASSESSMENT: ACTIVITIES ARE NOT PREVENTED

## 2022-08-23 ASSESSMENT — PAIN DESCRIPTION - ORIENTATION
ORIENTATION: LEFT
ORIENTATION: LEFT

## 2022-08-23 ASSESSMENT — PAIN DESCRIPTION - LOCATION
LOCATION: RIB CAGE
LOCATION: RIB CAGE

## 2022-08-23 ASSESSMENT — PAIN DESCRIPTION - PAIN TYPE: TYPE: CHRONIC PAIN

## 2022-08-23 NOTE — PROGRESS NOTES
Nephrology Progress Note        [address], [address] [place] [address]  Phone: [phone]  Office Hours: 8:30AM - 4:30PM  Monday - Friday 8/23/2022 7:15 AM  Subjective:   Admit Date: 8/16/2022  PCP: Dre Smith MD  Interval History:   On room air  Good BP  Reports not sleeping well as he is only getting 1 trazodone when he usually gets 2  He also complains about not getting his Kcl Tabs  Reports tremors and feeling like a jolt through is body    Diet: ADULT DIET; Regular  ADULT ORAL NUTRITION SUPPLEMENT; Breakfast, Dinner; Standard High Calorie/High Protein Oral Supplement  ADULT ORAL NUTRITION SUPPLEMENT; Lunch, Dinner; Frozen Oral Supplement      Data:   Scheduled Meds:   traZODone  50 mg Oral Nightly    tamsulosin  0.4 mg Oral Daily    [Held by provider] levothyroxine  75 mcg Oral Daily    atorvastatin  10 mg Oral Daily    sodium chloride flush  5-40 mL IntraVENous 2 times per day    pantoprazole  40 mg Oral QAM AC    polyethylene glycol  17 g Oral Daily    sertraline  100 mg Oral Daily    QUEtiapine  12.5 mg Oral Nightly    heparin (porcine)  5,000 Units SubCUTAneous 3 times per day    vitamin B-12  1,000 mcg Oral Daily    ezetimibe  10 mg Oral Daily    metoprolol succinate  100 mg Oral Daily     Continuous Infusions:   sodium chloride       PRN Meds:traMADol, sodium chloride flush, sodium chloride, ondansetron **OR** ondansetron, acetaminophen **OR** acetaminophen, sennosides, hydrOXYzine pamoate, hydrALAZINE  I/O last 3 completed shifts: In: 10 [I.V.:10]  Out: 500 [Urine:500]  No intake/output data recorded.     Intake/Output Summary (Last 24 hours) at 8/23/2022 0715  Last data filed at 8/23/2022 0114  Gross per 24 hour   Intake 10 ml   Output 500 ml   Net -490 ml           Objective:   Vitals: /71   Pulse 57   Temp 98.5 °F (36.9 °C) (Oral)   Resp 15   Ht 5' 10.98\" (1.803 m)   Wt 146 lb 9.7 oz (66.5 kg)   SpO2 96%   BMI 20.46 kg/m²   General appearance: alert and cooperative with exam, in no acute distress  HEENT: normocephalic, atraumatic,   Neck: supple, trachea midline  Lungs:  breathing comfortably on room air  Extremities: extremities atraumatic, no cyanosis or edema  Neurologic: alert, oriented, follows commands, interactive    Assessment and Plan:     Patient Active Problem List    Diagnosis Date Noted    Moderate malnutrition ([address] 08/19/2022    Tremors of nervous system 08/18/2022    Peripheral polyneuropathy 08/18/2022    Balance disorder 08/18/2022    Thrombocytopenia ([address] 08/18/2022    Generalized weakness 08/17/2022    HA (generalized anxiety disorder) 08/17/2022    MDD (major depressive disorder), recurrent episode, moderate ([address] 08/17/2022    Heart palpitations 08/17/2022    Acute anemia 08/16/2022    History of anemia due to CKD 09/14/2021    Stage 3b chronic kidney disease ([address] 10/29/2020    Persistent proteinuria 10/29/2020    Hypertensive heart and renal disease 10/29/2020    Melena 02/03/2020    GI bleed 02/03/2020    Acute kidney injury superimposed on CKD ([address] 02/03/2020     -Obtain BMP today and will resume his KCl if needed.   I did discuss that he is not on HCTZ currently and that is likely why he is not requiring any KCl    -Regarding the trazodone and his sleep , will defer to primary team    -BP at goal  -Avoid nephrotoxins    Thank you                  Electronically signed by Antonio Thorpe DO on 8/23/2022 at [address] [place] [address], Reymundo Ladd [facility], [place] [address]  PHONE: [phone]  FAX: [phone]

## 2022-08-23 NOTE — PROGRESS NOTES
V2.0  [facility] Hospitalist Progress Note      Name:  Bridger Beltran /Age/Sex: 1935  (81 y.o. male)   MRN & CSN:  [id number] & [id number] Encounter Date/Time: 2022 11:45 AM EDT    Location:  [facility] PCP: Augie Duenas MD       Hospital Day: 8    Assessment and Plan:   Bridger Beltran is a 81 y.o. male  presents with Acute anemia      Plan:    Generalized weakness   -patient continues to complain of generalized weakness patient has lost some weight since admission has lost about 4 pounds and states that he eats well. Imaging and findings have been negative for any etiology. Dietitian consult in place for calorie count. Patient is somewhat anxious as he does not feel continue to resolve her home his condition. Social work has been consulted for SNF placement. 2.  Hypertension   -Continue home meds    3. OLIVER   -Creatinine now stable. 4.  Pain local   -left lower rib cage patient seems to be allergic to nonsteroidals few doses of tramadol for pain as needed. No obvious deformity or fractures noted. 5.  Moderate malnutrition  Dietitian consult placed. Calorie count in place. Insomnia  -increase trazdone. Change vistaril PRN anxiety to PRN sleep. Anemia, thrombocytopenia  Factorial in the setting of chronic disease, poor nutritional status. Hematology is been consulted. No plan for bone marrow biopsy. Follow-up with outpatient. Constipation  Having regular bowel movements now. Is on a scheduled regimen. hypoThyroidism  -TSH is suppressed. Endocrinology consult in place. Holding levothyroxine for a few days. May need reduced dose on discharge. Diet ADULT DIET;  Regular  ADULT ORAL NUTRITION SUPPLEMENT; Breakfast, Dinner; Standard High Calorie/High Protein Oral Supplement  ADULT ORAL NUTRITION SUPPLEMENT; Lunch, Dinner; Frozen Oral Supplement   DVT Prophylaxis [] Lovenox, [x]  Heparin, [] SCDs, [] Ambulation,  [] Eliquis, [] Xarelto  [] Coumadin   Code Status Full Code Disposition From: Home  Expected Disposition: SNF  Estimated Date of Discharge:  stable for discharge pending SNF basement. Surrogate Decision Maker/ POA Kuldip zavala     Subjective:     Chief Complaint: Weakness     Ports he feels okay but still weak. Did not sleep last night. Reports he has been eating 100% of her meals. Denies any chest pain, shortness of breath, nausea, vomiting, dizziness. Review of Systems:    Review of Systems   Constitutional:  Negative for chills and fever. HENT:  Negative for sore throat. Eyes:  Negative for visual disturbance. Respiratory:  Negative for cough, shortness of breath and wheezing. Cardiovascular:  Negative for palpitations and leg swelling. Gastrointestinal:  Negative for nausea and vomiting. Endocrine: Negative for polyuria. Genitourinary:  Negative for dysuria. Musculoskeletal:  Negative for back pain. Skin:  Negative for wound. Neurological:  Negative for dizziness and weakness. Psychiatric/Behavioral:  Negative for confusion. Objective: Intake/Output Summary (Last 24 hours) at 8/23/2022 1125  Last data filed at 8/23/2022 0114  Gross per 24 hour   Intake --   Output 200 ml   Net -200 ml          Vitals:   Vitals:    08/23/22 0944   BP: 131/69   Pulse: 76   Resp:    Temp:    SpO2:        Physical Exam:   Physical Exam  Constitutional:       General: He is not in acute distress. HENT:      Mouth/Throat:      Mouth: Mucous membranes are moist.      Pharynx: No posterior oropharyngeal erythema. Eyes:      General: No scleral icterus. Extraocular Movements: Extraocular movements intact. Pupils: Pupils are equal, round, and reactive to light. Cardiovascular:      Rate and Rhythm: Normal rate and regular rhythm. Pulses: Normal pulses. Heart sounds: No murmur heard. Pulmonary:      Effort: Pulmonary effort is normal.      Breath sounds: No wheezing or rales.    Abdominal:      General: Bowel sounds are normal. There is no distension. Palpations: Abdomen is soft. Tenderness: There is no abdominal tenderness. Musculoskeletal:         General: Normal range of motion. Right lower leg: No edema. Left lower leg: No edema. Skin:     General: Skin is warm. Findings: No rash. Neurological:      General: No focal deficit present. Mental Status: He is alert and oriented to person, place, and time. Cranial Nerves: No cranial nerve deficit. Sensory: No sensory deficit. Motor: No weakness. Psychiatric:         Mood and Affect: Mood normal.       Medications:   Medications:    traZODone  100 mg Oral Nightly    tamsulosin  0.4 mg Oral Daily    [Held by provider] levothyroxine  75 mcg Oral Daily    atorvastatin  10 mg Oral Daily    sodium chloride flush  5-40 mL IntraVENous 2 times per day    pantoprazole  40 mg Oral QAM AC    polyethylene glycol  17 g Oral Daily    sertraline  100 mg Oral Daily    QUEtiapine  12.5 mg Oral Nightly    heparin (porcine)  5,000 Units SubCUTAneous 3 times per day    vitamin B-12  1,000 mcg Oral Daily    ezetimibe  10 mg Oral Daily    metoprolol succinate  100 mg Oral Daily      Infusions:    sodium chloride       PRN Meds: hydrOXYzine pamoate, 25 mg, Daily PRN  traMADol, 50 mg, Q6H PRN  sodium chloride flush, 5-40 mL, PRN  sodium chloride, , PRN  ondansetron, 4 mg, Q8H PRN   Or  ondansetron, 4 mg, Q6H PRN  acetaminophen, 650 mg, Q6H PRN   Or  acetaminophen, 650 mg, Q6H PRN  sennosides, 5 mL, BID PRN  hydrALAZINE, 10 mg, Q6H PRN      Labs      No results found for this or any previous visit (from the past 24 hour(s)). Imaging/Diagnostics Last 24 Hours   No results found.     Electronically signed by Joanne Valdez MD on 8/23/2022 at 11:25 AM

## 2022-08-23 NOTE — DISCHARGE INSTR - COC
Continuity of Care Form    Patient Name: Rossana Gaona   :  1935  MRN:  [id number]    Admit date:  2022  Discharge date:  ***    Code Status Order: Full Code   Advance Directives:     Admitting Physician:  No admitting provider for patient encounter. PCP: Kathy Vance MD    Discharging Nurse: [facility] 0745/6308-F  Discharging Unit Phone Number: [phone]    Emergency Contact:   Extended Emergency Contact Information  Primary Emergency Contact: Chiquis West  Address: [address]ESS [address],  [facility] [address]  Phone: [phone]  Mobile Phone: [phone]  Relation: Brother/Sister  Secondary Emergency Contact:  Lidia De La Fuente, [address] [facility]  Home Phone: [phone]  Mobile Phone: [phone]  Relation: Child    Past Surgical History:  Past Surgical History:   Procedure Laterality Date    CATARACT REMOVAL      COLONOSCOPY  13    divertics    COLONOSCOPY N/A 2020    COLONOSCOPY DIAGNOSTIC performed by Damaris Trejo MD at [address] [place] [address], COLON, DIAGNOSTIC  13    egd: normal    FRACTURE SURGERY      PROSTATE BIOPSY      UPPER GASTROINTESTINAL ENDOSCOPY N/A 2020    EGD DIAGNOSTIC ONLY performed by Damaris Trejo MD at [facility] ENDOSCOPY       Immunization History:   Immunization History   Administered Date(s) Administered     Influenza, FLUZONE (age 72 y+), High Dose 2020    COVID-19, MODERNA BLUE border, Primary or Immunocompromised, (age 12y+), IM, 100 mcg/0.5mL 2021, 2021, 11/10/2021    Hepatitis A Adult (Havrix, Vaqta) 2019    Influenza, Triv, inactivated, subunit, adjuvanted, IM (Fluad 65 yrs and older) 10/01/2019    Pneumococcal Conjugate 13-valent (Goupozl57) 2019    Tdap (Boostrix, Adacel) 2019    Zoster Recombinant (Shingrix) 2019, 2019       Active Problems:  Patient Active Problem List   Diagnosis Code    Melena K92.1    GI bleed K92.2    Acute kidney injury superimposed on CKD ([facility] N17.9, N18.9    Stage 3b chronic kidney disease (HCC) N18.32    Persistent proteinuria R80.1    Hypertensive heart and renal disease I13.10    History of anemia due to CKD N18.9, Z86.2    Acute anemia D64.9    Generalized weakness R53.1    HA (generalized anxiety disorder) F41.1    MDD (major depressive disorder), recurrent episode, moderate (HCC) F33.1    Heart palpitations R00.2    Tremors of nervous system R25.1    Peripheral polyneuropathy G62.9    Balance disorder R26.89    Thrombocytopenia ([facility]) D69.6    Moderate malnutrition ([facility]) E44.0       Isolation/Infection:   Isolation            No Isolation          Patient Infection Status       None to display            Nurse Assessment:  Last Vital Signs: /69   Pulse 76   Temp 98.8 °F (37.1 °C) (Oral)   Resp 16   Ht 5' 10.98\" (1.803 m)   Wt 146 lb 9.7 oz (66.5 kg)   SpO2 96%   BMI 20.46 kg/m²     Last documented pain score (0-10 scale): Pain Level: 5  Last Weight:   Wt Readings from Last 1 Encounters:   08/21/22 146 lb 9.7 oz (66.5 kg)     Mental Status:  oriented, alert, coherent, logical, thought processes intact, and able to concentrate and follow conversation    IV Access:  - None    Nursing Mobility/ADLs:  Walking   Assisted  Transfer  Assisted  Bathing  Assisted  Dressing  Assisted  Toileting  Assisted  Feeding  Independent  Med Admin  Dependent  Med Delivery   whole    Wound Care Documentation and Therapy:  Wound 08/17/22 Heel Anterior; Left intact pressure injury (Active)   Wound Etiology Deep tissue/Injury 08/23/22 0802   Dressing Status Other (Comment) 08/21/22 0900   Dressing/Treatment Open to air 08/23/22 0802   Drainage Amount None 08/20/22 0827   Odor None 08/20/22 0827   Number of days: 6        Elimination:  Continence:    Bowel: Yes  Bladder: Yes  Urinary Catheter: None   Colostomy/Ileostomy/Ileal Conduit: No       Date of Last BM: 2022    Intake/Output Summary (Last 24 hours) at 2022 1358  Last data filed at 2022 0114  Gross per 24 hour   Intake --   Output 200 ml   Net -200 ml     I/O last 3 completed shifts:   In: 10 [I.V.:10]  Out: 500 [Urine:500]    Safety Concerns:     None    Impairments/Disabilities:      None        Patient's personal belongings (please select all that are sent with patient):  Hearing Aides {LEFT/RIGHT/BILATERAL:9529299242}    RN SIGNATURE:  Electronically signed by Sharon Shell RN on 22 at 11:25 AM EDT    CASE MANAGEMENT/SOCIAL WORK SECTION    Inpatient Status Date: ***    Readmission Risk Assessment Score:  Readmission Risk              Risk of Unplanned Readmission:  18           Discharging to Facility/ Agency   Name:   Address:  Phone:  Fax:    Dialysis Facility (if applicable)   Name:  Address:  Dialysis Schedule:  Phone:  Fax:    / signature: {Esignature:261434658}    PHYSICIAN SECTION    Nutrition Therapy:  Current Nutrition Therapy:   [facility] DEEPA Diet List:096144567}    Routes of Feeding: {CHP DME Other Feedings:091523212}  Liquids: {Slp liquid thickness:96778}  Daily Fluid Restriction: {CHP DME Yes amt example:613410661}  Last Modified Barium Swallow with Video (Video Swallowing Test): {Done Not Done MKMO:949490265}    Treatments at the Time of Hospital Discharge:   Respiratory Treatments: ***  Oxygen Therapy:  {Therapy; copd oxygen:44084}  Ventilator:    { CC Vent PXGV:834969087}    Rehab Therapies: {THERAPEUTIC INTERVENTION:6850492451}  Weight Bearing Status/Restrictions: [address] CC Weight Bearin}  Other Medical Equipment (for information only, NOT a DME order):  {EQUIPMENT:208107444}  Other Treatments: ***    Prognosis: {Prognosis:8922339209}    Condition at Discharge: [address] Patient Condition:967357172}    Rehab Potential (if transferring to Rehab): {Prognosis:4195628669}    Recommended Labs or Other Treatments After Discharge: ***    Physician Certification: I certify the above information and transfer of Padma Child  is necessary for the continuing treatment of the diagnosis listed and that he requires {Admit to Appropriate Level of Care:73416} for {GREATER/LESS:462311951} 30 days.      Update Admission H&P: {CHP DME Changes in GISDY:675197165}    PHYSICIAN SIGNATURE:  {Esignature:220964021}

## 2022-08-23 NOTE — PLAN OF CARE
Problem: Safety - Adult  Goal: Free from fall injury  Outcome: Progressing     Problem: Nutrition Deficit:  Goal: Optimize nutritional status  Outcome: Progressing  Flowsheets (Taken 8/22/2022 1326 by Kamaljit Dawn, MS, RD, LD)  Nutrient intake appropriate for improving, restoring, or maintaining nutritional needs:   Monitor oral intake, labs, and treatment plans   Recommend appropriate diets, oral nutritional supplements, and vitamin/mineral supplements     Problem: Skin/Tissue Integrity  Goal: Absence of new skin breakdown  Description: 1. Monitor for areas of redness and/or skin breakdown  2. Assess vascular access sites hourly  3. Every 4-6 hours minimum:  Change oxygen saturation probe site  4. Every 4-6 hours:  If on nasal continuous positive airway pressure, respiratory therapy assess nares and determine need for appliance change or resting period.   Outcome: Progressing

## 2022-08-23 NOTE — PROGRESS NOTES
ONCOLOGY HEMATOLOGY CARE ([facility])  PROGRESS NOTE      HISTORY OF PRESENT ILLNESS   Roxanne Bull is a 81 y.o. male with past medical history significant for Arthritis, CAD coronary artery disease, GERD, hyperlipidemia, hypertension, right kidney cyst, thyroid disease, CVA, who presents to the ED due to constipation unresponsive to use of Dulcolax and MiraLAX. Impression   1. No acute intraabdominal process identified. 2. Colonic diverticulosis without CT evidence of diverticulitis. 3. Punctate nonobstructing bilateral renal stones. No obstructive uropathy   identified. 4. No bowel obstruction. 5. Severe atherosclerotic disease. He is noted to have Hgb 8.0, platelet count 517. This is consistent with labs dating back to 2020. Latest Reference Range & Units 8/18/22 02:54   Ferritin 30 - 400 NG/   Iron 59 - 158 ug/dL 49 (L)   UIBC 110 - 370 ug/dL 169   TIBC 250 - 450 ug/dL 218 (L)   Transferrin % 10 - 44 % 22   FOLATE, FOLAT 3.1 - 17.5 NG/ML 7.9   Vitamin B-12 211 - 911 pg/ml >2000 (H)   (L): Data is abnormally low  (H): Data is abnormally high    He has had workup per Dr. Marilynn Tovar having had a bone marrow biopsy 11/21. 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 He notes unintentional weight loss of 29 pounds in past two months. Reports appetite is poor, but lives alone and cooks for self. Reviewed 5/22 SPEP, Shepherd Lambda light chains, beta 2 microglobulin. He has known lung nodule for which he is receiving follow up.    8/19/2022 Hg 9.4 and platelet 532.    0/07/40  Seen and examined. No distress is noted. We reviewed plan of care and need to follow up in cancer center in two to three weeks. Lab Results   Component Value Date    WBC 6.9 08/19/2022    HGB 9.4 (L) 08/19/2022    HCT 28.5 (L) 08/19/2022    MCV 86.9 08/19/2022     (L) 08/19/2022     Labs remain stable.  He has no signs of bleeding. Fatigue persists. CR stable. PHYSICAL EXAM    Vitals: /69   Pulse 76   Temp 98.8 °F (37.1 °C) (Oral)   Resp 16   Ht 5' 10.98\" (1.803 m)   Wt 146 lb 9.7 oz (66.5 kg)   SpO2 96%   BMI 20.46 kg/m²   CONSTITUTIONAL: awake, alert, cooperative, no apparent distress, thin  EYES: sclera clear and + pallor. ENT: Normocephalic, without obvious abnormality, atraumatic  NECK: supple, symmetrical  HEMATOLOGIC/LYMPHATIC: no cervical, supraclavicular or axillary lymphadenopathy   LUNGS: no increased work of breathing and clear to auscultation   CARDIOVASCULAR: regular rate and rhythm, normal S1 and S2, no murmur noted  ABDOMEN: normal bowel sound, soft, non-distended, non-tender, no masses palpated, no hepatosplenomegaly   MUSCULOSKELETAL: full range of motion noted, tone is normal  NEUROLOGIC:  Motor skills grossly intact. SKIN: No jaundice. Skin appears intact   EXTREMITIES: no LE edema    ASSESSMENT/RECOMMENDATION    1. Anemia- multifactorial, chronic disease, nutritional status. Iron studies as above. BMB 2021 as above. I  recommend to follow up at cancer ctr. 2. Thrombocytopenia - chronic. To continue to monitor CBC daily, no signs of bleeding. 3. Unintentional weight loss- CT abdomen/pelvis as above, plan for CT chest for further evaluation. Has history of lung nodule. TSH noted to be 0.015. No acute cardiopulmonary findings. CT chest 8/18/2022: Unchanged ground-glass nodules within the left upper lobe measuring up to  approximately 7 mm. Findings are similar dating back to 11/30/2021. To continue to follow as an outpatient. 4. CKD- nephrology following. I recommend to follow up with us when he is discharged.

## 2022-08-23 NOTE — PROGRESS NOTES
Physical Therapy  Att. Pt eating lunch and when therapist offered to return in pm pt reported he did not sleep well and might take a nap. Will try back as schedule allows and will not wake pt if he is sleeping.

## 2022-08-23 NOTE — PROGRESS NOTES
Progress Note( Dr. Kaity Zendejas)  8/22/2022  Subjective:   Admit Date: 8/16/2022  PCP: Rick Naranjo MD    Admitted For :Generalized weakness weight loss    Consulted For: Reviewed thyroid function test question of need adjustment    Interval History: Patient still feels tired has palpitations   But his palpitations much better heart rate is around 70+    Denies any chest pains,   Denies SOB . Denies nausea or vomiting. Not eating much  No new bowel or bladder symptoms. Intake/Output Summary (Last 24 hours) at 8/22/2022 2209  Last data filed at 8/22/2022 0824  Gross per 24 hour   Intake 10 ml   Output 300 ml   Net -290 ml         DATA    CBC:   No results for input(s): WBC, HGB, PLT in the last 72 hours. CMP:  No results for input(s): NA, K, CL, CO2, BUN, CREATININE, GLU, CALCIUM, PROT, LABALBU, BILITOT, ALKPHOS, AST, ALT in the last 72 hours. Lipids:   Lab Results   Component Value Date/Time    CHOL 116 06/26/2020 01:55 PM    HDL 48 06/26/2020 01:55 PM    TRIG 80 06/26/2020 01:55 PM     Glucose:No results for input(s): POCGLU in the last 72 hours. [id number]:  Lab Results   Component Value Date/Time    LABA1C 5.2 08/18/2022 02:54 AM     High Sensitivity TSH:   Lab Results   Component Value Date/Time    TSHHS 0.019 08/21/2022 08:48 PM     Free T3:   Lab Results   Component Value Date/Time    FT3 2.1 08/17/2022 12:27 AM     Free T4:  Lab Results   Component Value Date/Time    T4FREE 0.98 08/21/2022 08:48 PM       CT CHEST WO CONTRAST   Final Result   No acute cardiopulmonary findings. Unchanged ground-glass nodules within the left upper lobe measuring up to   approximately 7 mm. Findings are similar dating back to 11/30/2021. Follow-up recommendations are listed below.       RECOMMENDATIONS:   Fleischner Society guidelines for follow-up and management of incidentally   detected subsolid pulmonary nodules:      Solitary ground glass nodule   < 6 mm - No routine follow-up.   > than or equal to 6 mm - CT at 6-12 months to confirm persistence, then CT   every 2 years until 5 years. Solitary part-solid nodules   < 6 mm - No routine follow-up.   > than or equal to 6 mm - CT at 3-6 to confirm persistence. If unchanged and   solid component remains less than 6 mm, annual CT should be performed for 5   years. Multiple subsolid nodules   < 6 mm - CT at 3-6 months. If stable, consider CT at 2 and 4 years. > than or equal to 6 mm - CT at 3-6 months. Subsequent management based on   the most suspicious nodule(s). - Low risk patients include individuals with minimal or absent history of   smoking and other known risk factors. - High risk patients include individuals with a history or smoking or known   risk factors. Radiology 2017 http://pubs. rsna.org/doi/full/10.1148/radiol. 8686098655         CT HEAD WO CONTRAST   Final Result   No acute intracranial abnormality. NM MYOCARDIAL SPECT REST EXERCISE OR RX   Final Result      US RETROPERITONEAL LIMITED   Final Result   No acute abnormalities are identified to explain acute on chronic renal   failure.               Scheduled Medicines   Medications:    traZODone  50 mg Oral Nightly    tamsulosin  0.4 mg Oral Daily    [Held by provider] levothyroxine  75 mcg Oral Daily    atorvastatin  10 mg Oral Daily    sodium chloride flush  5-40 mL IntraVENous 2 times per day    pantoprazole  40 mg Oral QAM AC    polyethylene glycol  17 g Oral Daily    sertraline  100 mg Oral Daily    QUEtiapine  12.5 mg Oral Nightly    heparin (porcine)  5,000 Units SubCUTAneous 3 times per day    vitamin B-12  1,000 mcg Oral Daily    ezetimibe  10 mg Oral Daily    metoprolol succinate  100 mg Oral Daily      Infusions:    sodium chloride           Objective:   Vitals: BP (!) 144/68   Pulse 57   Temp 98.2 °F (36.8 °C) (Oral)   Resp 15   Ht 5' 10.98\" (1.803 m)   Wt 146 lb 9.7 oz (66.5 kg)   SpO2 96%   BMI 20.46 kg/m²   General appearance: alert and cooperative with exam  Neck: no JVD or bruit  Thyroid : Normal lobes   Lungs: Has Vesicular Breath sounds   Heart:  regular rate and rhythm  Abdomen: soft, non-tender; bowel sounds normal; no masses,  no organomegaly  Musculoskeletal: Normal  Extremities: extremities normal, , no edema  Neurologic:  Awake, alert, oriented to name, place and time. Cranial nerves II-XII are grossly intact. Motor is  intact. Sensory is intact. ,  and gait is normal.    Assessment:     Patient Active Problem List:     Melena     GI bleed     Acute kidney injury superimposed on CKD (HCC)     Stage 3b chronic kidney disease (HCC)     Persistent proteinuria     Hypertensive heart and renal disease     History of anemia due to CKD     Acute anemia     Generalized weakness     HA (generalized anxiety disorder)     MDD (major depressive disorder), recurrent episode, moderate (HCC)     Heart palpitations     Tremors of nervous system     Peripheral polyneuropathy     Balance disorder     Thrombocytopenia ([address]      Plan:     Reviewed POC blood glucose . Labs and X ray results   Reviewed Current Medicines   Hold Synthroid for next few more days  Being seen by cardiology  Reviewed  notes of nephrology  Also consulted hematology for anemia and thrombocytopenia to be chronic  Will follow     .      Adi Sarabia MD, MD [Follow-up Visit ___] : a follow-up visit  for [unfilled]

## 2022-08-24 VITALS
TEMPERATURE: 97.9 F | WEIGHT: 146.61 LBS | RESPIRATION RATE: 16 BRPM | OXYGEN SATURATION: 94 % | HEART RATE: 58 BPM | DIASTOLIC BLOOD PRESSURE: 66 MMHG | SYSTOLIC BLOOD PRESSURE: 135 MMHG | HEIGHT: 71 IN | BODY MASS INDEX: 20.52 KG/M2

## 2022-08-24 LAB
HCT VFR BLD CALC: 29.4 % (ref 42–52)
HEMOGLOBIN: 9.1 GM/DL (ref 13.5–18)
MCH RBC QN AUTO: 28.3 PG (ref 27–31)
MCHC RBC AUTO-ENTMCNC: 31 % (ref 32–36)
MCV RBC AUTO: 91.3 FL (ref 78–100)
PDW BLD-RTO: 14.5 % (ref 11.7–14.9)
PLATELET # BLD: 145 K/CU MM (ref 140–440)
PMV BLD AUTO: 10.7 FL (ref 7.5–11.1)
RBC # BLD: 3.22 M/CU MM (ref 4.6–6.2)
SARS-COV-2, NAAT: NOT DETECTED
SOURCE: NORMAL
WBC # BLD: 6.7 K/CU MM (ref 4–10.5)

## 2022-08-24 PROCEDURE — 6370000000 HC RX 637 (ALT 250 FOR IP): Performed by: NURSE PRACTITIONER

## 2022-08-24 PROCEDURE — 85027 COMPLETE CBC AUTOMATED: CPT

## 2022-08-24 PROCEDURE — 36415 COLL VENOUS BLD VENIPUNCTURE: CPT

## 2022-08-24 PROCEDURE — 2580000003 HC RX 258: Performed by: STUDENT IN AN ORGANIZED HEALTH CARE EDUCATION/TRAINING PROGRAM

## 2022-08-24 PROCEDURE — 87635 SARS-COV-2 COVID-19 AMP PRB: CPT

## 2022-08-24 PROCEDURE — 94761 N-INVAS EAR/PLS OXIMETRY MLT: CPT

## 2022-08-24 PROCEDURE — 6360000002 HC RX W HCPCS: Performed by: INTERNAL MEDICINE

## 2022-08-24 PROCEDURE — 99232 SBSQ HOSP IP/OBS MODERATE 35: CPT | Performed by: NURSE PRACTITIONER

## 2022-08-24 PROCEDURE — 6370000000 HC RX 637 (ALT 250 FOR IP): Performed by: INTERNAL MEDICINE

## 2022-08-24 PROCEDURE — 6370000000 HC RX 637 (ALT 250 FOR IP): Performed by: STUDENT IN AN ORGANIZED HEALTH CARE EDUCATION/TRAINING PROGRAM

## 2022-08-24 RX ORDER — POLYETHYLENE GLYCOL 3350 17 G/17G
17 POWDER, FOR SOLUTION ORAL DAILY PRN
Qty: 5 EACH | Refills: 0 | Status: SHIPPED | OUTPATIENT
Start: 2022-08-24 | End: 2022-08-29

## 2022-08-24 RX ORDER — DOCUSATE SODIUM 100 MG/1
100 CAPSULE, LIQUID FILLED ORAL DAILY PRN
Qty: 30 CAPSULE | Refills: 0 | Status: SHIPPED | OUTPATIENT
Start: 2022-08-24

## 2022-08-24 RX ADMIN — SODIUM CHLORIDE, PRESERVATIVE FREE 10 ML: 5 INJECTION INTRAVENOUS at 08:38

## 2022-08-24 RX ADMIN — TAMSULOSIN HYDROCHLORIDE 0.4 MG: 0.4 CAPSULE ORAL at 08:34

## 2022-08-24 RX ADMIN — CYANOCOBALAMIN TAB 1000 MCG 1000 MCG: 1000 TAB at 08:34

## 2022-08-24 RX ADMIN — HEPARIN SODIUM 5000 UNITS: 5000 INJECTION INTRAVENOUS; SUBCUTANEOUS at 05:37

## 2022-08-24 RX ADMIN — SERTRALINE 100 MG: 50 TABLET, FILM COATED ORAL at 08:34

## 2022-08-24 RX ADMIN — METOPROLOL SUCCINATE 100 MG: 50 TABLET, EXTENDED RELEASE ORAL at 08:34

## 2022-08-24 RX ADMIN — PANTOPRAZOLE SODIUM 40 MG: 40 TABLET, DELAYED RELEASE ORAL at 05:37

## 2022-08-24 RX ADMIN — ATORVASTATIN CALCIUM 10 MG: 10 TABLET, FILM COATED ORAL at 08:34

## 2022-08-24 RX ADMIN — POLYETHYLENE GLYCOL (3350) 17 G: 17 POWDER, FOR SOLUTION ORAL at 08:34

## 2022-08-24 RX ADMIN — EZETIMIBE 10 MG: 10 TABLET ORAL at 08:34

## 2022-08-24 ASSESSMENT — PAIN SCALES - GENERAL: PAINLEVEL_OUTOF10: 0

## 2022-08-24 NOTE — PROGRESS NOTES
Physical Therapy  Attempted to see patient but declined due to discharge order recently being placed

## 2022-08-24 NOTE — PROGRESS NOTES
Comprehensive Nutrition Assessment    Type and Reason for Visit:  Reassess    Nutrition Recommendations/Plan:   Continue current diet and supplement regimen as ordered  Add PM snack   Encourage po intake as able  Please document all po intake  Will continue to monitor po intake, weight trends, poc     Malnutrition Assessment:  Malnutrition Status: Moderate malnutrition (08/19/22 1235)    Context:  Chronic Illness     Nutrition Assessment:    Pt remains on regular diet with standard and frozen oral nutrition supplements ordered, 1 meal of % documented 8/22, no other po intake documented, visited pt at bedside, pt resting in bed at time of visit, pt reports UBW ~161-171#, report wt loss x 1 year, non clinically significant wt loss noted, pt reports good appetite, reports consuming 100% of dinner 8/23 and 100% of breakfast this morning, also reports consuming 100% of supplements, pt states that PTA he consumed 2 meals daily, lived alone and prepared meals for himself, agreeable to PM snack, plan for rehab at d/c, will continue to follow at high nutrition risk  Nutrition Related Findings:    sitting up in chair visiting with nephew, lunch tray present ate more than 50% of meal Wound Type: Deep Tissue Injury     Current Nutrition Intake & Therapies:    Average Meal Intake: % (per pt report)  Average Supplements Intake: % (per pt report)  ADULT DIET; Regular  ADULT ORAL NUTRITION SUPPLEMENT; Breakfast, Dinner; Standard High Calorie/High Protein Oral Supplement  ADULT ORAL NUTRITION SUPPLEMENT; Lunch, Dinner; Frozen Oral Supplement    Anthropometric Measures:  Height: 5' 10.98\" (180.3 cm)  Ideal Body Weight (IBW): 172 lbs (78 kg)    Admission Body Weight: 151 lb 3.8 oz (68.6 kg) (first measured weight)  Current Body Weight: 146 lb 9.7 oz (66.5 kg), 85.2 % IBW.  Weight Source: Bed Scale  Current BMI (kg/m2): 20.5  Usual Body Weight: 158 lb (71.7 kg) (last september, 2019 wt 172#)  % Weight Change (Calculated): -10  Weight Adjustment For: No Adjustment  BMI Categories: Underweight (BMI less than 22) age over 72    Estimated Daily Nutrient Needs:  Energy Requirements Based On: Kcal/kg  Weight Used for Energy Requirements: Current  Energy (kcal/day): 5649-5960 (25-30 olive/kg)  Weight Used for Protein Requirements: Current  Protein (g/day): 64-77 (1-1.2 g/kg)  Method Used for Fluid Requirements: 1 ml/kcal  Fluid (ml/day): 1900    Nutrition Diagnosis:   Moderate malnutrition, In context of chronic illness related to other (comment), psychological cause or life stress, altered GI function (reduced appetite in illness) as evidenced by constipation, poor intake prior to admission, weight loss, BMI  Nutrition Interventions:   Food and/or Nutrient Delivery: Continue Current Diet, Continue Oral Nutrition Supplement (add PM snack)  Nutrition Education/Counseling: No recommendation at this time  Coordination of Nutrition Care: Continue to monitor while inpatient  Plan of Care discussed with: patient  Goals:  Previous Goal Met: Progressing toward Goal(s)  Goals: PO intake 75% or greater, by next RD assessment  Nutrition Monitoring and Evaluation:   Behavioral-Environmental Outcomes: None Identified  Food/Nutrient Intake Outcomes: Food and Nutrient Intake, Supplement Intake  Physical Signs/Symptoms Outcomes: Biochemical Data, GI Status, Hemodynamic Status, Weight, Skin, Meal Time Behavior  Discharge Planning:     Too soon to determine     Zari Rizzo [address], [address] [place] [address],   Contact: [phone]

## 2022-08-24 NOTE — PROGRESS NOTES
ONCOLOGY HEMATOLOGY CARE ([facility])  PROGRESS NOTE      HISTORY OF PRESENT ILLNESS   Sam Rivas is a 81 y.o. male with past medical history significant for Arthritis, CAD coronary artery disease, GERD, hyperlipidemia, hypertension, right kidney cyst, thyroid disease, CVA, who presents to the ED due to constipation unresponsive to use of Dulcolax and MiraLAX. Impression   1. No acute intraabdominal process identified. 2. Colonic diverticulosis without CT evidence of diverticulitis. 3. Punctate nonobstructing bilateral renal stones. No obstructive uropathy   identified. 4. No bowel obstruction. 5. Severe atherosclerotic disease. He is noted to have Hgb 8.0, platelet count 385. This is consistent with labs dating back to 2020. Latest Reference Range & Units 8/18/22 02:54   Ferritin 30 - 400 NG/   Iron 59 - 158 ug/dL 49 (L)   UIBC 110 - 370 ug/dL 169   TIBC 250 - 450 ug/dL 218 (L)   Transferrin % 10 - 44 % 22   FOLATE, FOLAT 3.1 - 17.5 NG/ML 7.9   Vitamin B-12 211 - 911 pg/ml >2000 (H)   (L): Data is abnormally low  (H): Data is abnormally high    He has had workup per Dr. Lilly Avitia having had a bone marrow biopsy 11/21. 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 He notes unintentional weight loss of 29 pounds in past two months. Reports appetite is poor, but lives alone and cooks for self. Reviewed 5/22 SPEP, [employer] Lambda light chains, beta 2 microglobulin. He has known lung nodule for which he is receiving follow up.    8/19/2022 Hg 9.4 and platelet 806.    3/13/29  Lab Results   Component Value Date    WBC 6.7 08/24/2022    HGB 9.1 (L) 08/24/2022    HCT 29.4 (L) 08/24/2022    MCV 91.3 08/24/2022     08/24/2022     CBC stable. Seen by Dietician. Patient approved for SNF. No complaints on today's visit.     PHYSICAL EXAM    Vitals: /66   Pulse 58   Temp 97.9 °F (36.6 °C) (Oral)   Resp 16   Ht 5' 10.98\" (1.803 m)   Wt 146 lb 9.7 oz (66.5 kg)   SpO2 94%   BMI 20.46 kg/m²   CONSTITUTIONAL: awake, alert, cooperative, no apparent distress, thin  EYES: sclera clear and + pallor. ENT: Normocephalic, without obvious abnormality, atraumatic  NECK: supple, symmetrical  HEMATOLOGIC/LYMPHATIC: no cervical, supraclavicular or axillary lymphadenopathy   LUNGS: no increased work of breathing and clear to auscultation   CARDIOVASCULAR: regular rate and rhythm, normal S1 and S2, no murmur noted  ABDOMEN: normal bowel sound, soft, non-distended, non-tender, no masses palpated, no hepatosplenomegaly   MUSCULOSKELETAL: full range of motion noted, tone is normal  NEUROLOGIC:  Motor skills grossly intact. SKIN: No jaundice. Skin appears intact   EXTREMITIES: no LE edema    ASSESSMENT/RECOMMENDATION    1. Anemia- multifactorial, chronic disease, nutritional status. Iron studies as above. BMB 2021 as above. I  recommend to follow up at cancer ctr. 2. Thrombocytopenia - chronic. To continue to monitor CBC daily, no signs of bleeding. 3. Unintentional weight loss- CT abdomen/pelvis as above, plan for CT chest for further evaluation. Has history of lung nodule. TSH noted to be 0.015. No acute cardiopulmonary findings. CT chest 8/18/2022: Unchanged ground-glass nodules within the left upper lobe measuring up to  approximately 7 mm. Findings are similar dating back to 11/30/2021. To continue to follow as an outpatient. 4. CKD- nephrology following. Has been approved for SNF placement. He will need to follow up with us as an outpatient.

## 2022-08-24 NOTE — DISCHARGE SUMMARY
V2.0  Discharge Summary    Name:  Sb Shahid /Age/Sex: 1935 (81 y.o. male)   Admit Date: 2022  Discharge Date: 22    MRN & CSN:  [id number] & [id number] Encounter Date and Time 22 9:50 AM EDT    Attending:  Jes Arreola MD Discharging Provider: Jes Arreola MD       Hospital Course:     Brief HPI:   Sb Shahid is a 81 y.o. male with a pmh of CAD, chronic GERD, hyperlipidemia, hypertension, history of kidney cyst, hypothyroidism, history of CVA who presents with Acute anemia. The patient presented to the emergency department because of constipation that has been going on for the last 3 days. Patient has Dulcolax and MiraLAX at home patient has been trying to use that with minimal relief. Able to pass gas. Able to eat and drink without feeling hornlike nausea and vomiting. Denies any abdominal pain, abdominal distention, dizziness, lightheadedness, chest pain, shortness of breath. It is also noted the patient is on clindamycin    Brief Problem Based Course:   Generalized weakness/failure to thrive  -patient continues to complain of generalized weakness patient has lost some weight since admission has lost about 4 pounds and states that he eats well. Imaging and findings have been negative for any etiology.     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 -Continue dietary supplement  -Outpatient follow-up with oncology      2. Hypertension   -Continue home meds     3. CKD stage III  -Creatinine now stable. 4.  Pain local   -left lower rib cage patient seems to be allergic to nonsteroidals few doses of tramadol for pain as needed. No obvious deformity or fractures noted. 5.  Moderate malnutrition  -Continue dietary supplementation    Insomnia  -Continue trazodone.       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 Is on a scheduled regimen. hypoThyroidism  -Continue levothyroxine upon discharge  -Outpatient follow-up. The patient expressed appropriate understanding of, and agreement with the discharge recommendations, medications, and plan.      Consults this admission:  IP CONSULT TO ONCOLOGY  IP CONSULT TO ENDOCRINOLOGY  IP CONSULT TO CARDIOLOGY  IP CONSULT TO NEUROLOGY  IP CONSULT TO PSYCHIATRY  IP CONSULT TO DIETITIAN  IP CONSULT TO NEPHROLOGY  IP CONSULT TO DIETITIAN  IP CONSULT TO HOME CARE NEEDS    Discharge Diagnosis:   Acute anemia        Discharge Instruction:   Follow up appointments:   Primary care physician: Jennifer Samuels MD within 2 weeks  Diet: regular diet   Activity: activity as tolerated  Disposition: Discharged to:   []Home, [][facility], [x]SNF, []Acute Rehab, []Hospice   Condition on discharge: Stable  Labs and Tests to be Followed up as an outpatient by PCP or Specialist:     Discharge Medications:        Medication List        START taking these medications      docusate sodium 100 MG capsule  Commonly known as: COLACE  Take 1 capsule by mouth daily as needed for Constipation            CONTINUE taking these medications      alfuzosin 10 MG extended release tablet  Commonly known as: UROXATRAL     doxazosin 4 MG tablet  Commonly known as: CARDURA     esomeprazole Magnesium 40 MG Pack  Commonly known as: NEXIUM     ezetimibe 10 MG tablet  Commonly known as: ZETIA     levothyroxine 75 MCG tablet  Commonly known as: SYNTHROID     losartan-hydroCHLOROthiazide 100-25 MG per tablet  Commonly known as: HYZAAR  Take 1 tablet by mouth daily     nebivolol 10 MG tablet  Commonly known as: BYSTOLIC     omeprazole 40 MG delayed release capsule  Commonly known as: PRILOSEC     polyethylene glycol 17 g packet  Commonly known as: MiraLax  Take 17 g by mouth daily as needed for Constipation     potassium chloride 10 MEQ extended release tablet  Commonly known as: KLOR-CON     sertraline 50 MG tablet  Commonly known as: ZOLOFT     simvastatin 10 MG tablet  Commonly known as: ZOCOR     traZODone 50 MG tablet  Commonly known as: DESYREL     vitamin B-12 1000 MCG tablet  Commonly known as: CYANOCOBALAMIN     vitamin D 50 MCG (2000 UT) Caps capsule            STOP taking these medications      clorazepate 7.5 MG tablet  Commonly known as: TRANXENE               Where to Get Your Medications        These medications were sent to [address] [place] [address], [facility] [place] Laurette Cheadle [phone]  [address] [place] [phone]      Phone: [phone]   docusate sodium 100 MG capsule  polyethylene glycol 17 g packet        Objective Findings at Discharge:   /66   Pulse 58   Temp 97.9 °F (36.6 °C) (Oral)   Resp 16   Ht 5' 10.98\" (1.803 m)   Wt 146 lb 9.7 oz (66.5 kg)   SpO2 94%   BMI 20.46 kg/m²       Physical Exam:   General: NAD  Eyes: EOMI  ENT: neck supple  Cardiovascular: Regular rate. Respiratory: Clear to auscultation  Gastrointestinal: Soft, non tender  Genitourinary: no suprapubic tenderness  Musculoskeletal: No edema  Skin: warm, dry  Neuro: Alert. Psych: Mood appropriate.          Labs and Imaging   Echocardiogram complete 2D with doppler with color    Result Date: 8/17/2022  Transthoracic Echocardiography Report (TTE)  Demographics   Patient Name        [address] [place] [address]  Date of Study        08/17/2022   Date of Birth       1935     Gender               Male   Age                 81 year(s)     Race                 Black   Patient Number      [id number]     Room Number          7046   Visit Number        [id number]   Corporate ID        [id number]   Accession Number    [id number]     [address] [place] [address]   Ordering Physician                 Interpreting         Heladio Reed Physician            MD  Procedure Type of Study   TTE procedure:ECHOCARDIOGRAM COMPLETE 2D W DOPPLER W COLOR. Procedure Date Date: 08/17/2022 Start: 12:06 PM Study Location: Portable Technical Quality: Fair visualization Indications:Elevated BNP. Patient Status: Routine Height: 71 inches Weight: 151 pounds BSA: 1.87 m2 BMI: 21.06 kg/m2 HR: 62 bpm BP: 172/70 mmHg  Conclusions   Summary  Technically difficult study. Left ventricular systolic function is normal.  Ejection fraction is visually estimated at 50-55%. No significant valvular disease noted. No evidence of any pericardial effusion. Signature   ------------------------------------------------------------------  Electronically signed by Minerva Rasheed MD (Interpreting  physician) on 08/17/2022 at 04:09 PM  ------------------------------------------------------------------   Findings   Left Ventricle  Left ventricular systolic function is normal.  Ejection fraction is visually estimated at 50-55%. No regional wall motion abnormalites. Left ventricle size is normal.  Normal diastolic function. Left Atrium  Essentially normal left atrium. Right Atrium  Essentially normal right atrium. Right Ventricle  Essentially normal right ventricle. Aortic Valve  Structurally normal aortic valve. Mitral Valve  Structurally normal mitral valve. Tricuspid Valve  Mild tricuspid regurgitation; RVSP: 31 mmHg. Pulmonic Valve  Pulmonic valve is structurally normal.   Pericardial Effusion  No evidence of any pericardial effusion. Pleural Effusion  No evidence of pleural effusion. Miscellaneous  IVC and abdominal aorta are within normal limits.   M-Mode/2D Measurements & Calculations   LV Diastolic Dimension:  LV Systolic Dimension:  LA Dimension: 2.6 cmAO Root  4.93 cm                  3.56 cm                 Dimension: 3.6 cmLA Area:  LV FS:27.8 %             LV Volume Diastolic: 49 10 cm2  LV PW Diastolic: 9.80 cm ml  LV PW Systolic: 8.36 cm  LV Volume Systolic: 23  Septum Diastolic: 8.70   ml  cm                       LV EDV/LV EDV Index: 49  Septum Systolic: 4.93 cm AB/35 X7OL ESV/LV ESV   LA/Aorta: 0.72  CO: 5.82 l/min           Index: 23 ml/12 m2  CI: 3.11 l/m*m2          EF Calculated (A4C):    LA volume/Index: 17 ml /9m2                           53.1 %  LV Area Diastolic: 46.3  EF Calculated (2D):  cm2                      53.5 %  LV Area Systolic: 05.5  cm2                      LV Length: 7.42 cm                            LVOT: 2.2 cm  Doppler Measurements & Calculations   MV Peak E-Wave: 52.8  AV Peak Velocity: 126 cm/s   LVOT Peak Velocity: 136  cm/s                  AV Peak Gradient: 6.35 mmHg  cm/s  MV Peak A-Wave: 82.4  AV Mean Velocity: 90.1 cm/s  LVOT Mean Velocity: 88.9  cm/s                  AV Mean Gradient: 4 mmHg     cm/s  MV E/A Ratio: 0.64    AV VTI: 26 cm                LVOT Peak Gradient: 7  MV Peak Gradient:     AV Area (Continuity):3.61    mmHgLVOT Mean Gradient: 4  1.12 mmHg             cm2                          mmHg                                                     Estimated RVSP: 31 mmHg  MV P1/2t: 62 msec     LVOT VTI: 24.7 cm            Estimated RAP:3 mmHg  MVA by PHT:3.55 cm2                        Estimated PASP: 31.3 mmHg  MV E' Septal                                       TR Velocity:266 cm/s  Velocity: 9.54 cm/s                                TR Gradient:28.3 mmHg  MV E' Lateral  Velocity: 8.22 cm/s  MV E/E' septal: 5.53  MV E/E' lateral: 6.42      CT HEAD WO CONTRAST    Result Date: 8/18/2022  EXAMINATION: CT OF THE HEAD WITHOUT CONTRAST  8/18/2022 5:31 pm TECHNIQUE: CT of the head was performed without the administration of intravenous contrast. Automated exposure control, iterative reconstruction, and/or weight based adjustment of the mA/kV was utilized to reduce the radiation dose to as low as reasonably achievable.  COMPARISON: 07/25/2010 HISTORY: ORDERING SYSTEM PROVIDED HISTORY: confusion per family TECHNOLOGIST PROVIDED HISTORY: Reason for exam:->confusion per family Has a \"code stroke\" or \"stroke alert\" been called? ->No Reason for Exam: confusion per family FINDINGS: BRAIN/VENTRICLES: The ventricles and sulci are diffusely enlarged. Low attenuation is seen in the periventricular and subcortical white matter. No acute intracranial hemorrhage or acute infarct is identified ORBITS: The visualized portion of the orbits demonstrate no acute abnormality. SINUSES: The visualized paranasal sinuses and mastoid air cells demonstrate no acute abnormality. SOFT TISSUES/SKULL:  No acute abnormality of the visualized skull or soft tissues. No acute intracranial abnormality. CT CHEST WO CONTRAST    Result Date: 8/22/2022  EXAMINATION: CT OF THE CHEST WITHOUT CONTRAST 8/18/2022 5:30 pm TECHNIQUE: CT of the chest was performed without the administration of intravenous contrast. Multiplanar reformatted images are provided for review. Automated exposure control, iterative reconstruction, and/or weight based adjustment of the mA/kV was utilized to reduce the radiation dose to as low as reasonably achievable. COMPARISON: CT 06/02/2022, chest radiograph 08/13/2022, CT chest 11/30/2021 HISTORY: ORDERING SYSTEM PROVIDED HISTORY: weight loss, completion of workup TECHNOLOGIST PROVIDED HISTORY: Reason for exam:->weight loss, completion of workup Reason for Exam: weight loss, completion of workup FINDINGS: Evaluation for neoplasm, infection, focal lesions, and trauma is limited without the use of IV contrast. Mediastinum: The heart is normal in size without pericardial effusion. Ectasia of the ascending aorta measures 3.6 cm. No discrete lymphadenopathy, although the study is limited without IV contrast.  Partially calcified thyroid nodules, unchanged. Atherosclerosis of the aorta and coronary arteries. Lungs/pleura: Mild tracheobronchial secretions and central bronchial wall thickening. Bibasilar scarring, unchanged. RX    Result Date: 8/18/2022  Cardiac Perfusion Imaging   Demographics   Patient Name      [address] [place] [address]      Date of study        08/18/2022   Date of Birth     1935         Gender               Male   Age               81 year(s)         Race                 Black   Patient Number    [id number]         Room Number          4862   Visit Number      [id number]          Height               71 inches   Corporate ID      [id number]           Weight               151 pounds   Accession Number  [id number]                                        NM Technologist      [facility]   Ordering          [facility] [place]  Physician         MD                 Cardiologist         MD   Conclusions   Summary  Normal EF 75 % with normal ventricular contractility. No infarct or ischemia  noted. Normal stress myocardial perfusion. This is a normal study. Signatures   ------------------------------------------------------------------  Electronically signed by Author Lalo BATES (Interpreting  cardiologist) on 08/18/2022 at 16:31  ------------------------------------------------------------------  Procedure Procedure Type:   Nuclear Stress Test:Pharmacological, Myocardial Perfusion Imaging with  Pharm, NM MYOCARDIAL SPECT REST EXERCISE OR RX  Indications: Abnormal rest ECG. Risk Factors   The patient risk factors include:prior PCI;hypercholesterolemia and  hypertension. Stress Protocols   Resting ECG  Normal sinus rhythm. Resting HR:55 bpm  Resting BP:174/76 mmHg  Stress Protocol:Pharmacologic - Lexiscan  Peak HR:98 bpm                               HR/BP product:[id number]  Peak BP:174/76 mmHg  Predicted HR: 135 bpm  % of predicted HR: 73   Exercise duration: 01:01 min  Reason for termination:Completed   ECG Findings  Normal sinus rhythm. Symptoms  No symptoms with stress. Stress Interpretation  ECG portion of stress test is negative for ischemia by diagnostic criteria. Procedure Medications   - Lexiscan I.V. bolus (over 15sec.) 0.4 mg admininstered @ 08/18/2022 11:35. Imaging Protocols   Rest                             Stress   Isotope:Sestamibi 99mTc          Isotope: Sestamibi 99mTc  Isotope dose:10.9 mCi            Isotope dose:29.8 mCi  Administration route: I.V. Administration route: I.V. Injection Date:08/18/2022 09:25  Injection Date:08/18/2022 11:35  Scan Date:08/18/2022 10:10       Scan Date:08/18/2022 12:20   Technique:        SPECT          Technique:        Gated                                                     SPECT   Procedure Description   Upon patient arrival, the patient is identified using two identifiers and  the physician order is verified. An IV is established and 8-11mCi of 99mTc  Sestamibi is intravenously injected and followed with 10mL 0.9% Normal  Saline flush. A circulation period of 45 minutes occurs prior to resting  SPECT imaging. After imaging is complete the patient is escorted to the  stress lab. The patient is connected to the ECG and blood pressure is  measured. The RN starts the stress portion of the exam and rapidly  intravenously injects Lexiscan (regadenosine) 0.4mg over a period of 10 to15  seconds and follows with 5mL 0.9% Normal Saline flush. Immediately following  the Nuclear Technologist intravenously injects 22-33mCi of 99mTc Sestamibi  and 5mL 0.9% Normal Saline flush. After completion, recovery, and removal of  the IV, the patient rests during the second circulation period of 45  minutes. Final stress SPECT gated imaging is performed. The patient may  return home or to their room after stress imaging. The images are processed  and final charting is completed and sent to the appropriate cardiologist for  interpretation and reporting. Perfusion Interpretation   Normal EF 75 % with normal ventricular contractility. No infarct or ischemia  noted.   Imaging Results    Summed scores     - Summed stress score: 0     - Summed rest score: 0     - Summed difference score:    0   Rest ejection  Ejection fraction:75 %  EDV :53 ml  ESV :13 ml  Stroke volume :40 ml  Medical History   Accession#:  [id number]  Admission Data Admission date: 08/16/2022 Admission Time: 23:17 Hospital Status: Inpatient. US RETROPERITONEAL LIMITED    Result Date: 8/17/2022  EXAMINATION: ULTRASOUND OF THE KIDNEYS 8/17/2022 12:23 pm COMPARISON: Abdomen pelvis CT, 08/13/2022 HISTORY: ORDERING SYSTEM PROVIDED HISTORY: acute renal failure on CKD TECHNOLOGIST PROVIDED HISTORY: Reason for exam:->acute renal failure on CKD FINDINGS: The right kidney measures 9.2 cm in length and the left kidney measures 7.9 cm in length. Multiple cystic structures are seen within both kidneys. Largest on right measures 1.1 cm. Largest on the left measures 2.1 cm. The cystic structures are considered benign and require no specific follow-up. In addition, there is a complex multiloculated cystic structure with thick septae day in the upper pole of the right kidney, measuring up to 3.8 cm. This has been present since 2011, and presumably reflects a benign, complex cyst, and requires no specific follow-up. No hydronephrosis is identified within either kidney. No acute abnormalities are identified to explain acute on chronic renal failure. CBC:   Recent Labs     08/24/22  0844   WBC 6.7   HGB 9.1*        BMP:    Recent Labs     08/23/22  1025      K 4.7      CO2 27   BUN 59*   CREATININE 2.5*   GLUCOSE 116*     Hepatic: No results for input(s): AST, ALT, ALB, BILITOT, ALKPHOS in the last 72 hours.   Lipids:   Lab Results   Component Value Date/Time    CHOL 116 06/26/2020 01:55 PM    HDL 48 06/26/2020 01:55 PM    TRIG 80 06/26/2020 01:55 PM     Hemoglobin A1C:   Lab Results   Component Value Date/Time    LABA1C 5.2 08/18/2022 02:54 AM     TSH: No results found for: TSH  Troponin:   Lab Results   Component Value Date/Time    TROPONINT <0.010 08/13/2022 03:20 PM TROPONINT <0.010 02/03/2020 07:38 PM     Lactic Acid: No results for input(s): LACTA in the last 72 hours. BNP: No results for input(s): PROBNP in the last 72 hours.   UA:  Lab Results   Component Value Date/Time    NITRU NEGATIVE 08/18/2022 04:10 AM    COLORU YELLOW 08/18/2022 04:10 AM    WBCUA <1 08/18/2022 04:10 AM    RBCUA 1 08/18/2022 04:10 AM    MUCUS RARE 10/08/2018 01:33 PM    TRICHOMONAS NONE SEEN 08/18/2022 04:10 AM    BACTERIA NEGATIVE 08/18/2022 04:10 AM    CLARITYU CLEAR 08/18/2022 04:10 AM    SPECGRAV 1.010 08/18/2022 04:10 AM    LEUKOCYTESUR NEGATIVE 08/18/2022 04:10 AM    UROBILINOGEN 0.2 08/18/2022 04:10 AM    BILIRUBINUR NEGATIVE 08/18/2022 04:10 AM    BLOODU SMALL 08/18/2022 04:10 AM    KETUA NEGATIVE 08/18/2022 04:10 AM     Urine Cultures: No results found for: LABURIN  Blood Cultures: No results found for: BC  No results found for: BLOODCULT2  Organism: No results found for: ORG    Time Spent Discharging patient 35 minutes    Electronically signed by Jes Arreola MD on 8/24/2022 at 9:50 AM

## 2022-08-24 NOTE — CARE COORDINATION
CM spoke with Michael Murphy at [facility]. Pt is approved for SNF placement and may go whenever medically cleared. Pt will need a Rapid covid prior to discharges. CM completed the HENS for pt. CM notified Dr Tequila Brown. .CM into see pt and updated. CM called Dtr and updated her on the above. CM called [facility] and transport time is 2 pm .   CM called dtr , Jose Heck and updated. CM called Michael Murphy at [address].

## 2022-08-24 NOTE — PROGRESS NOTES
Progress Note( Dr. Mari Barber)  8/23/2022  Subjective:   Admit Date: 8/16/2022  PCP: Jennifer Samuels MD    Admitted For :Generalized weakness weight loss    Consulted For: Reviewed thyroid function test question of need adjustment    Interval History: Patient still feels tired has palpitations   But his palpitations much better heart rate is around 70+    Denies any chest pains,   Denies SOB . Denies nausea or vomiting. Not eating much  No new bowel or bladder symptoms. Intake/Output Summary (Last 24 hours) at 8/23/2022 2224  Last data filed at 8/23/2022 2118  Gross per 24 hour   Intake --   Output 600 ml   Net -600 ml         DATA    CBC:   No results for input(s): WBC, HGB, PLT in the last 72 hours. CMP:  Recent Labs     08/23/22  1025      K 4.7      CO2 27   BUN 59*   CREATININE 2.5*   CALCIUM 9.3       Lipids:   Lab Results   Component Value Date/Time    CHOL 116 06/26/2020 01:55 PM    HDL 48 06/26/2020 01:55 PM    TRIG 80 06/26/2020 01:55 PM     Glucose:No results for input(s): POCGLU in the last 72 hours. EdbzniymjeH1K:  Lab Results   Component Value Date/Time    LABA1C 5.2 08/18/2022 02:54 AM     High Sensitivity TSH:   Lab Results   Component Value Date/Time    TSHHS 0.019 08/21/2022 08:48 PM     Free T3:   Lab Results   Component Value Date/Time    FT3 2.1 08/17/2022 12:27 AM     Free T4:  Lab Results   Component Value Date/Time    T4FREE 0.98 08/21/2022 08:48 PM       CT CHEST WO CONTRAST   Final Result   No acute cardiopulmonary findings. Unchanged ground-glass nodules within the left upper lobe measuring up to   approximately 7 mm. Findings are similar dating back to 11/30/2021. Follow-up recommendations are listed below.       RECOMMENDATIONS:   Fleischner Society guidelines for follow-up and management of incidentally   detected subsolid pulmonary nodules:      Solitary ground glass nodule   < 6 mm - No routine follow-up.   > than or equal to 6 mm - CT at 6-12 months to confirm persistence, then CT   every 2 years until 5 years. Solitary part-solid nodules   < 6 mm - No routine follow-up.   > than or equal to 6 mm - CT at 3-6 to confirm persistence. If unchanged and   solid component remains less than 6 mm, annual CT should be performed for 5   years. Multiple subsolid nodules   < 6 mm - CT at 3-6 months. If stable, consider CT at 2 and 4 years. > than or equal to 6 mm - CT at 3-6 months. Subsequent management based on   the most suspicious nodule(s). - Low risk patients include individuals with minimal or absent history of   smoking and other known risk factors. - High risk patients include individuals with a history or smoking or known   risk factors. Radiology 2017 http://pubs. rsna.org/doi/full/10.1148/radiol. 1960512960         CT HEAD WO CONTRAST   Final Result   No acute intracranial abnormality. NM MYOCARDIAL SPECT REST EXERCISE OR RX   Final Result      US RETROPERITONEAL LIMITED   Final Result   No acute abnormalities are identified to explain acute on chronic renal   failure.               Scheduled Medicines   Medications:    traZODone  100 mg Oral Nightly    tamsulosin  0.4 mg Oral Daily    [Held by provider] levothyroxine  75 mcg Oral Daily    atorvastatin  10 mg Oral Daily    sodium chloride flush  5-40 mL IntraVENous 2 times per day    pantoprazole  40 mg Oral QAM AC    polyethylene glycol  17 g Oral Daily    sertraline  100 mg Oral Daily    QUEtiapine  12.5 mg Oral Nightly    heparin (porcine)  5,000 Units SubCUTAneous 3 times per day    vitamin B-12  1,000 mcg Oral Daily    ezetimibe  10 mg Oral Daily    metoprolol succinate  100 mg Oral Daily      Infusions:    sodium chloride           Objective:   Vitals: /61   Pulse 62   Temp 99 °F (37.2 °C) (Oral)   Resp 16   Ht 5' 10.98\" (1.803 m)   Wt 146 lb 9.7 oz (66.5 kg)   SpO2 98%   BMI 20.46 kg/m²   General appearance: alert and cooperative with exam  Neck: no JVD or

## 2022-08-24 NOTE — PLAN OF CARE
Problem: Discharge Planning  Goal: Discharge to home or other facility with appropriate resources  8/23/2022 2358 by Tahmina Rodriguez RN  Outcome: Progressing  8/23/2022 1249 by Lizzy Powers RN  Outcome: Progressing     Problem: Pain  Goal: Verbalizes/displays adequate comfort level or baseline comfort level  8/23/2022 2358 by Tahmina Rodriguez RN  Outcome: Progressing  8/23/2022 1249 by Lizzy Powers RN  Outcome: Progressing     Problem: ABCDS Injury Assessment  Goal: Absence of physical injury  8/23/2022 2358 by Tahmina Rodriguez RN  Outcome: Progressing  8/23/2022 1249 by Lizzy Powers RN  Outcome: Progressing     Problem: Safety - Adult  Goal: Free from fall injury  8/23/2022 2358 by Tahmina Rodriguez RN  Outcome: Progressing  8/23/2022 1249 by Lizzy Powers RN  Outcome: Progressing

## 2022-08-24 NOTE — PROGRESS NOTES
Called [facility] to give report on patient, spoke with Korin Modi in payroll, nurse to resume care of this patient is on lunch, and will have her call when returns.

## 2022-08-25 NOTE — PROGRESS NOTES
Progress Note( Dr. Aparna Cohen)  8/24/2022  Subjective:   Admit Date: 8/16/2022  PCP: Reina Phillip MD    Admitted For :Generalized weakness weight loss    Consulted For: Reviewed thyroid function test question of need adjustment    Interval History: Patient still feels tired has palpitations   But his palpitations much better heart rate is around 70+    Denies any chest pains,   Denies SOB . Denies nausea or vomiting. Not eating much  No new bowel or bladder symptoms. No intake or output data in the 24 hours ending 08/24/22 2324      DATA    CBC:   Recent Labs     08/24/22  0844   WBC 6.7   HGB 9.1*         CMP:  Recent Labs     08/23/22  1025      K 4.7      CO2 27   BUN 59*   CREATININE 2.5*   CALCIUM 9.3       Lipids:   Lab Results   Component Value Date/Time    CHOL 116 06/26/2020 01:55 PM    HDL 48 06/26/2020 01:55 PM    TRIG 80 06/26/2020 01:55 PM     Glucose:No results for input(s): POCGLU in the last 72 hours. [id number]:  Lab Results   Component Value Date/Time    LABA1C 5.2 08/18/2022 02:54 AM     High Sensitivity TSH:   Lab Results   Component Value Date/Time    TSHHS 0.019 08/21/2022 08:48 PM     Free T3:   Lab Results   Component Value Date/Time    FT3 2.1 08/17/2022 12:27 AM     Free T4:  Lab Results   Component Value Date/Time    T4FREE 0.98 08/21/2022 08:48 PM       CT CHEST WO CONTRAST   Final Result   No acute cardiopulmonary findings. Unchanged ground-glass nodules within the left upper lobe measuring up to   approximately 7 mm. Findings are similar dating back to 11/30/2021. Follow-up recommendations are listed below. RECOMMENDATIONS:   Fleischner Society guidelines for follow-up and management of incidentally   detected subsolid pulmonary nodules:      Solitary ground glass nodule   < 6 mm - No routine follow-up.   > than or equal to 6 mm - CT at 6-12 months to confirm persistence, then CT   every 2 years until 5 years.       Solitary part-solid nodules < 6 mm - No routine follow-up.   > than or equal to 6 mm - CT at 3-6 to confirm persistence. If unchanged and   solid component remains less than 6 mm, annual CT should be performed for 5   years. Multiple subsolid nodules   < 6 mm - CT at 3-6 months. If stable, consider CT at 2 and 4 years. > than or equal to 6 mm - CT at 3-6 months. Subsequent management based on   the most suspicious nodule(s). - Low risk patients include individuals with minimal or absent history of   smoking and other known risk factors. - High risk patients include individuals with a history or smoking or known   risk factors. Radiology 2017 http://pubs. rsna.org/doi/full/10.1148/radiol. 0505127713         CT HEAD WO CONTRAST   Final Result   No acute intracranial abnormality. NM MYOCARDIAL SPECT REST EXERCISE OR RX   Final Result      US RETROPERITONEAL LIMITED   Final Result   No acute abnormalities are identified to explain acute on chronic renal   failure. Scheduled Medicines   Medications:   REM     Infusions:   REM        Objective:   Vitals: /66   Pulse 58   Temp 97.9 °F (36.6 °C) (Oral)   Resp 16   Ht 5' 10.98\" (1.803 m)   Wt 146 lb 9.7 oz (66.5 kg)   SpO2 94%   BMI 20.46 kg/m²   General appearance: alert and cooperative with exam  Neck: no JVD or bruit  Thyroid : Normal lobes   Lungs: Has Vesicular Breath sounds   Heart:  regular rate and rhythm  Abdomen: soft, non-tender; bowel sounds normal; no masses,  no organomegaly  Musculoskeletal: Normal  Extremities: extremities normal, , no edema  Neurologic:  Awake, alert, oriented to name, place and time. Cranial nerves II-XII are grossly intact. Motor is  intact. Sensory is intact. ,  and gait is normal.    Assessment:     Patient Active Problem List:     Melena     GI bleed     Acute kidney injury superimposed on CKD (HCC)     Stage 3b chronic kidney disease ([facility] [address]     Persistent proteinuria     Hypertensive heart and renal disease     History of anemia due to CKD     Acute anemia     Generalized weakness     HA (generalized anxiety disorder)     MDD (major depressive disorder), recurrent episode, moderate (HCC)     Heart palpitations     Tremors of nervous system     Peripheral polyneuropathy     Balance disorder     Thrombocytopenia ([address]      Plan:     Reviewed POC blood glucose . Labs and X ray results   Reviewed Current Medicines   Hold Synthroid for next few more days  Being seen by cardiology  Reviewed  notes of nephrology  Also consulted hematology for anemia and thrombocytopenia to be chronic  Case management is working for transfer the patient to skilled nursing unit long-term care  Will follow     .      Jg Norman MD, MD

## 2022-09-07 NOTE — PROGRESS NOTES
Patient Name:  Judd Schmidt  Patient :  1935  Patient MRN:  [id number]     Primary Oncologist: Corinne Bonner MD  Referring Provider: Criselda Aguiar MD     Date of Service: 2022       Chief Complaint:    Chief Complaint   Patient presents with    Follow-up     He came in for follow up visit. Patient Active Problem List:     Stage 3b chronic kidney disease      Persistent proteinuria     Hypertensive heart and renal disease     History of anemia due to CKD     HPI:   Payam Jackman is a pleasant 81year old male patient who was referred for evaluation of anemia. He reported that he has sickle cell trait. On 2021 WBC was 5.9, hemoglobin 8.8, hematocrit 29.2, MCV 89.6, platelet 814. Peripheral smear showed anisocytosis, polychromasia, ovalocytes. Ferritin was 394, iron 23, TIBC 197, folate 12.9, B12 more than 2000. TSH was 0.06. Free T4 was normal at 1.31. Creatinine was 2.9, total protein 7.4, albumin 3.4. SPEP showed a faint monoclonal spike noted measuring 500 mg/dL. VERNELL showed faint monoclonal free lambda light chain proteins. 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 This dominant lesion is decreased in size since  and is probably benign. These renal lesions could be further evaluated with renal mass protocol MRI at patient's current renal function if clinically indicated. Bone density in 2020 showed osteopenia. I reviewed his records from epic. He has been anemic at least since . Hemoglobin in  was normal.  In 2020 he had episode of rectal bleed and received 3 units of packed red blood cell. Reportedly he had upper and lower endoscopic study in the first part of  by Dr. Hannah Mitchell.   4-5 polyps were removed from the colon and he has hiatal hernia. Follow-up in for 5 years was recommended. Creatinine has slowly gone up since 2011. Anemia could be multifactorial.  He is on B12 supplement, Synthroid, Plavix. We discussed about bone marrow study within 2 weeks. He has chronic back pain for years related to degenerative joint disease. Bone marrow study in November 2021 showed  Final Pathologic Diagnosis:   A-B. Bone marrow, clot section and aspirate smears:   -     Normocellular marrow with trilineage maturing hematopoiesis. -     Monotypic B-cell population is detected by flow cytometry (0.4% of total cells). 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 -     No definitive morphologic or flow cytometric evidence of a plasma cell neoplasm. Peripheral blood smear:   -     Normocytic anemia with anisocytosis. -     Mild thrombocytopenia. Comment:   The monotypic B-cell population has a non-specific immunophenotype that can be seen in monoclonal B-cell lymphocytosis as well as B-cell lymphomas such as marginal zone lymphoma, lymphoplasmacytic lymphoma, and VC01-SOLPMTMT follicular lymphoma. Deletion of the long arm of chromosome 20 (20q) is a recurring abnormality in myeloid disorders such as MDS but is not considered to be a definitive evidence of myelodysplasia on its own. No definitive morphologic evidence of myelodysplasia is seen in the current specimen. Clinical followup is recommended. Bone marrow differential count:   Blasts:               3%     Flow cytometry: Monotypic B-cells, suspicious for a B-cell lymphoproliferative disorder. Karyotype: 46,XY,del(20)(q11.2q13.1)[5]/46,XY[16]     FISH:   Del(1p): Not Detected   Dup(1q): Not Detected   Gains(5, 9, 15): Not Detected   Del(13q)/-13: Not Detected   Del(17p)(TP53): Not Detected   IGH(Rearrangement): Not Detected     Serum LDH was 150. WBC 6.0, hemoglobin 8.4, MCV 85.4, platelet 582. TSH was 0.06. T4 was 1.31.   Creatinine was 2.9.  He is agreeable to have CT chest abdomen pelvis to assess for potential lymphoproliferative disorder. CT CAP 11/30/2021:  1. No acute findings in the chest, abdomen or pelvis on this unenhanced study. Specifically no pathologically enlarged adenopathy in the chest, abdomen or pelvis. 2. Stable 4.1 cm Bosniak type IIF right renal cyst.  3. 3 mm subsolid nodule in left upper lobe, nonspecific. This can be followed on subsequent surveillance CT. 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 We will continue to monitor closely. He is agreeable to have labs and CT chest prior to next office visit in 6 months. He will discuss with family doctor about the Plavix. Anemia could be multifactorial.  He has also an underlying chronic kidney disease. In May 2022 creatinine was 2.2 Hg 8.6, WBC 5.4 and platelet 067. Absolute lymphocytes was 2.1. Chest on June 2, 2022 showed  Stable ground-glass nodular opacities in the left upper lobe. No new airspace disease identified. Attention to on follow-up imaging is recommended. 5/31/2022 13:20  Ferritin: 245 Iron: 53 (L) TIBC: 244 (L) Transferrin %: 22  Lambda Free Light Chains : 107.35 (H)  Kappa/Lambda Fluid C Ratio: 1.03  SPE/VERNELL Interpretation:   VERNELL gel shows a faint band in IgG lambda suggestive of a specific immune response or an early monoclonal protein. Close clinical correlation with VERNELL follow-up is suggested, if clinically indicated. 9/19/2022 he came in for follow up visit. He was in the hospital in August 2022. Denied any blood transfusion. 8/2022 creatinine 2.5, TSH 0.019 L, free T4 wnl. Hg 9.1, MCV 91.3, plt 145. CT abdomen 8/13/2022  1. No acute intraabdominal process identified. 2. Colonic diverticulosis without CT evidence of diverticulitis. 3. Punctate nonobstructing bilateral renal stones. No obstructive uropathy identified. 4. No bowel obstruction. 5. Severe atherosclerotic disease.     CT chest 8/2022:  No acute cardiopulmonary findings. Unchanged ground-glass nodules within the left upper lobe measuring up to approximately 7 mm. Findings are similar dating back to 2021. Follow-up recommendations are listed below. RECOMMENDATIONS:  Fleischner Society guidelines for follow-up and management of incidentally detected subsolid pulmonary nodules:  Solitary ground glass nodule  < 6 mm - No routine follow-up.  > than or equal to 6 mm - CT at 6-12 months to confirm persistence, then CT every 2 years until 5 years. He is agreeable to have follow CT chest in 2023 and will discuss with PCP. He is agreeable to have follow up labs prior to next OV. He denied any bone pain to suggest multiple myeloma. Denied any nausea, vomiting or diarrhea. No fever or chills. No chest pain, shortness of breath or palpitation. No headache or dizzy spell. No melena or hematochezia. Denied any dysuria or hematuria. Past Medical History:   Diagnosis Date    Arthritis     CAD (coronary artery disease)     GERD (gastroesophageal reflux disease)     Hyperlipidemia     Hypertension     Kidney cysts     Right kidney    Thyroid disease     Unspecified cerebral artery occlusion with cerebral infarction      Past Surgical History:   Procedure Laterality Date    CATARACT REMOVAL      COLONOSCOPY  13    divertics    COLONOSCOPY N/A 2020    COLONOSCOPY DIAGNOSTIC performed by Ashley Levy MD at [address] [place] [address], [place], DIAGNOSTIC  13    egd: normal    FRACTURE SURGERY      PROSTATE BIOPSY      UPPER GASTROINTESTINAL ENDOSCOPY N/A 2020    EGD DIAGNOSTIC ONLY performed by Ashley Levy MD at [facility] ENDOSCOPY     Social History:   He denied any history of smoking. No alcohol or illicit drug use. He has 4 children. 2 daughters  of heart problem. 1 son  of heart problem. Family History:   1 living daughter had breast cancer. Review of Systems:   The remainder of the review of system is unremarkable. Vital Signs: /65 (Site: Left Upper Arm, Position: Sitting, Cuff Size: Medium Adult)   Pulse 75   Temp 97.5 °F (36.4 °C) (Infrared)   Resp 16   Ht 5' 10.98\" (1.803 m)   Wt 148 lb (67.1 kg)   SpO2 97%   BMI 20.65 kg/m²      Physical Exam:  CONSTITUTIONAL: awake, alert, cooperative, no apparent distress   EYES: pupils equal, round and reactive to light. Sclera clear and + pallor  ENT: Normocephalic, without obvious abnormality, atraumatic  NECK: supple, symmetrical, no jugular venous distension and no carotid bruits   HEMATOLOGIC/LYMPHATIC: no cervical, supraclavicular or axillary lymphadenopathy   LUNGS: no increased work of breathing and clear to auscultation   CARDIOVASCULAR: regular rate and rhythm, normal S1 and S2, no murmur noted  ABDOMEN: normal bowel sound, soft, non-distended, non-tender, No palpable masses. MUSCULOSKELETAL: full range of motion noted, tone is normal  NEUROLOGIC: Motor and sensory grossly intact. CN II - XII  grossly intact   SKIN: No jaundice. Dry skin. EXTREMITIES: no LE edema  or cyanosis. Homans negative.      Labs:  Hematology:  Lab Results   Component Value Date    WBC 5.5 09/19/2022    RBC 3.28 (L) 09/19/2022    HGB 9.2 (L) 09/19/2022    HCT 29.4 (L) 09/19/2022    MCV 89.6 09/19/2022    MCH 28.0 09/19/2022    MCHC 31.3 (L) 09/19/2022    RDW 14.3 09/19/2022     09/19/2022    MPV 9.0 09/19/2022    BANDSPCT 2 (L) 05/31/2022    SEGSPCT 43.4 09/19/2022    EOSRELPCT 1.8 09/19/2022    BASOPCT 0.5 09/19/2022    LYMPHOPCT 44.4 (H) 09/19/2022    MONOPCT 9.7 (H) 09/19/2022    BANDABS 0.11 05/31/2022    SEGSABS 2.4 09/19/2022    EOSABS 0.1 09/19/2022    BASOSABS 0.0 09/19/2022    LYMPHSABS 2.4 09/19/2022    MONOSABS 0.5 09/19/2022    DIFFTYPE AUTOMATED DIFFERENTIAL 09/19/2022    ANISOCYTOSIS 1+ 09/27/2021    POLYCHROM 1+ 09/27/2021    PLTM FEW 09/27/2021     Lab Results   Component Value Date    ESR 82 (H) 10/27/2021 Chemistry:  Lab Results   Component Value Date     08/23/2022    K 4.7 08/23/2022     08/23/2022    CO2 27 08/23/2022    BUN 59 (H) 08/23/2022    CREATININE 2.5 (H) 08/23/2022    GLUCOSE 116 (H) 08/23/2022    CALCIUM 9.3 08/23/2022    PROT 7.6 08/13/2022    LABALBU 4.0 08/13/2022    BILITOT 0.3 08/13/2022    ALKPHOS 74 08/13/2022    AST 15 08/13/2022    ALT 8 (L) 08/13/2022    LABGLOM 25 (L) 08/23/2022    GFRAA 30 (L) 08/23/2022    PHOS 3.0 08/19/2022    MG 1.8 08/19/2022    POCGLU 101 (H) 08/19/2022     Lab Results   Component Value Date    TSHHS 0.019 (L) 08/21/2022    T4FREE 0.98 08/21/2022    FT3 2.1 (L) 08/17/2022     Immunology:  Lab Results   Component Value Date    PROT 7.6 08/13/2022    SPEP  10/27/2021     INTERPRETATION - A faint free lambda light chains monoclonal band is seen. 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 SAF    ALBUMINELP 3.2 10/27/2021    LABALPH 0.40 05/31/2022    LABALPH 0.73 05/31/2022    LABBETA 1.02 05/31/2022    GAMGLOB 2.1 (H) 10/27/2021     Coagulation Panel:  Lab Results   Component Value Date    PROTIME 14.7 (H) 02/08/2020    INR 1.21 02/08/2020    APTT 36.2 02/08/2020     Anemia Panel:  Lab Results   Component Value Date    WIYLCTZU57 >2000 (H) 08/18/2022    FOLATE 7.9 08/18/2022      Observations:  No data recorded     Assessment & Plan:  1. He has chronic anemia. Plavix was stopped. He had upper and lower endoscopic study in the first part of 2021. BM Study in November 2021 was grossly unremarkable except for small monoclonal B-cell lymphocytosis. CT chest abdomen pelvis in summer 2021 were reviewed. Likely anemia is multifactorial. LDH was unremarkable. I will continue to monitor CBC. 2.  He has chronic kidney disease. He has monoclonal gammopathy of unknown significance. I will continue to monitor SPEP/VERNELL, serum light chain study. 3.  He has lung nodule.  CT chest in August 2022 was reviewed. Follow up in 6 months is recommended. Return to clinic in 4 months. All of his questions has been answered for today. I have discussed the above stated plan with the patient and they verbalized understanding and agreed with the plan.

## 2022-09-19 ENCOUNTER — OFFICE VISIT (OUTPATIENT)
Dept: ONCOLOGY | Age: 87
End: 2022-09-19
Payer: MEDICARE

## 2022-09-19 ENCOUNTER — HOSPITAL ENCOUNTER (OUTPATIENT)
Dept: INFUSION THERAPY | Age: 87
Discharge: HOME | End: 2022-09-19
Payer: MEDICARE

## 2022-09-19 ENCOUNTER — HOSPITAL ENCOUNTER (OUTPATIENT)
Age: 87
Discharge: HOME | End: 2022-09-19
Payer: MEDICARE

## 2022-09-19 VITALS
HEART RATE: 75 BPM | OXYGEN SATURATION: 97 % | DIASTOLIC BLOOD PRESSURE: 65 MMHG | RESPIRATION RATE: 16 BRPM | TEMPERATURE: 97.5 F | BODY MASS INDEX: 20.72 KG/M2 | SYSTOLIC BLOOD PRESSURE: 139 MMHG | WEIGHT: 148 LBS | HEIGHT: 71 IN

## 2022-09-19 DIAGNOSIS — D47.2 MGUS (MONOCLONAL GAMMOPATHY OF UNKNOWN SIGNIFICANCE): Primary | ICD-10-CM

## 2022-09-19 LAB
ALBUMIN SERPL-MCNC: 3.8 GM/DL (ref 3.4–5)
ALP BLD-CCNC: 78 IU/L (ref 40–128)
ALT SERPL-CCNC: 14 U/L (ref 10–40)
ANION GAP SERPL CALCULATED.3IONS-SCNC: 14 MMOL/L (ref 4–16)
AST SERPL-CCNC: 18 IU/L (ref 15–37)
BASOPHILS ABSOLUTE: 0 K/CU MM
BASOPHILS RELATIVE PERCENT: 0.5 % (ref 0–1)
BILIRUB SERPL-MCNC: 0.3 MG/DL (ref 0–1)
BUN BLDV-MCNC: 28 MG/DL (ref 6–23)
CALCIUM SERPL-MCNC: 9.5 MG/DL (ref 8.3–10.6)
CHLORIDE BLD-SCNC: 100 MMOL/L (ref 99–110)
CO2: 22 MMOL/L (ref 21–32)
CREAT SERPL-MCNC: 2.3 MG/DL (ref 0.9–1.3)
CREATININE URINE: 89.5 MG/DL (ref 39–259)
DIFFERENTIAL TYPE: ABNORMAL
EOSINOPHILS ABSOLUTE: 0.1 K/CU MM
EOSINOPHILS RELATIVE PERCENT: 1.8 % (ref 0–3)
GFR AFRICAN AMERICAN: 33 ML/MIN/1.73M2
GFR NON-AFRICAN AMERICAN: 27 ML/MIN/1.73M2
GLUCOSE BLD-MCNC: 90 MG/DL (ref 70–99)
HCT VFR BLD CALC: 29.4 % (ref 42–52)
HEMOGLOBIN: 9.2 GM/DL (ref 13.5–18)
IMMATURE NEUTROPHIL %: 0.2 % (ref 0–0.43)
LACTATE DEHYDROGENASE: 179 IU/L (ref 120–246)
LYMPHOCYTES ABSOLUTE: 2.4 K/CU MM
LYMPHOCYTES RELATIVE PERCENT: 44.4 % (ref 24–44)
MCH RBC QN AUTO: 28 PG (ref 27–31)
MCHC RBC AUTO-ENTMCNC: 31.3 % (ref 32–36)
MCV RBC AUTO: 89.6 FL (ref 78–100)
MONOCYTES ABSOLUTE: 0.5 K/CU MM
MONOCYTES RELATIVE PERCENT: 9.7 % (ref 0–4)
NUCLEATED RBC %: 0 %
PDW BLD-RTO: 14.3 % (ref 11.7–14.9)
PHOSPHORUS: 2.7 MG/DL (ref 2.5–4.9)
PLATELET # BLD: 174 K/CU MM (ref 140–440)
PMV BLD AUTO: 9 FL (ref 7.5–11.1)
POTASSIUM SERPL-SCNC: 3.9 MMOL/L (ref 3.5–5.1)
PROT/CREAT RATIO, UR: 0.4
RBC # BLD: 3.28 M/CU MM (ref 4.6–6.2)
SEGMENTED NEUTROPHILS ABSOLUTE COUNT: 2.4 K/CU MM
SEGMENTED NEUTROPHILS RELATIVE PERCENT: 43.4 % (ref 36–66)
SODIUM BLD-SCNC: 136 MMOL/L (ref 135–145)
TOTAL IMMATURE NEUTOROPHIL: 0.01 K/CU MM
TOTAL NUCLEATED RBC: 0 K/CU MM
TOTAL PROTEIN: 7.3 GM/DL (ref 6.4–8.2)
URINE TOTAL PROTEIN: 39.8 MG/DL
WBC # BLD: 5.5 K/CU MM (ref 4–10.5)

## 2022-09-19 PROCEDURE — 86334 IMMUNOFIX E-PHORESIS SERUM: CPT

## 2022-09-19 PROCEDURE — 84165 PROTEIN E-PHORESIS SERUM: CPT

## 2022-09-19 PROCEDURE — 1123F ACP DISCUSS/DSCN MKR DOCD: CPT | Performed by: INTERNAL MEDICINE

## 2022-09-19 PROCEDURE — 84100 ASSAY OF PHOSPHORUS: CPT

## 2022-09-19 PROCEDURE — 80053 COMPREHEN METABOLIC PANEL: CPT

## 2022-09-19 PROCEDURE — 80069 RENAL FUNCTION PANEL: CPT

## 2022-09-19 PROCEDURE — 82570 ASSAY OF URINE CREATININE: CPT

## 2022-09-19 PROCEDURE — 99213 OFFICE O/P EST LOW 20 MIN: CPT | Performed by: INTERNAL MEDICINE

## 2022-09-19 PROCEDURE — 84156 ASSAY OF PROTEIN URINE: CPT

## 2022-09-19 PROCEDURE — 83883 ASSAY NEPHELOMETRY NOT SPEC: CPT

## 2022-09-19 PROCEDURE — 85025 COMPLETE CBC W/AUTO DIFF WBC: CPT

## 2022-09-19 PROCEDURE — 36415 COLL VENOUS BLD VENIPUNCTURE: CPT

## 2022-09-19 PROCEDURE — 99211 OFF/OP EST MAY X REQ PHY/QHP: CPT

## 2022-09-19 PROCEDURE — 83615 LACTATE (LD) (LDH) ENZYME: CPT

## 2022-09-19 PROCEDURE — 84155 ASSAY OF PROTEIN SERUM: CPT

## 2022-09-19 RX ORDER — BUSPIRONE HYDROCHLORIDE 7.5 MG/1
7.5 TABLET ORAL 2 TIMES DAILY
COMMUNITY

## 2022-09-19 RX ORDER — POLYETHYLENE GLYCOL 3350 17 G/17G
17 POWDER, FOR SOLUTION ORAL DAILY PRN
COMMUNITY

## 2022-09-19 RX ORDER — AMLODIPINE BESYLATE 2.5 MG/1
2.5 TABLET ORAL DAILY
COMMUNITY

## 2022-09-19 RX ORDER — TRAMADOL HYDROCHLORIDE 50 MG/1
50 TABLET ORAL 2 TIMES DAILY
COMMUNITY

## 2022-09-19 RX ORDER — MIRTAZAPINE 15 MG/1
15 TABLET, FILM COATED ORAL NIGHTLY
COMMUNITY

## 2022-09-19 RX ORDER — TAMSULOSIN HYDROCHLORIDE 0.4 MG/1
0.4 CAPSULE ORAL DAILY
COMMUNITY

## 2022-09-19 RX ORDER — CLINDAMYCIN HYDROCHLORIDE 150 MG/1
150 CAPSULE ORAL 4 TIMES DAILY
COMMUNITY

## 2022-09-19 NOTE — PROGRESS NOTES
MA Rooming Questions  Patient: Meek Cruz  MRN: [id number]    Date: 9/19/2022        1. Do you have any new issues? yes - Pt c/o headaches and numbness/tingling in hands and feet         Pt c/o night sweats    2. Do you need any refills on medications?    no    3. Have you had any imaging done since your last visit?   no    4. Have you been hospitalized or seen in the emergency room since your last visit here?   no    5. Did the patient have a depression screening completed today?  No    No data recorded     PHQ-9 Given to (if applicable):               PHQ-9 Score (if applicable):                     [] Positive     []  Negative              Does question #9 need addressed (if applicable)                     [] Yes    []  No               Les Landis MA

## 2022-09-20 PROBLEM — I10 PRIMARY HYPERTENSION: Status: ACTIVE | Noted: 2022-09-20

## 2022-09-22 LAB
KAPPA QUANT FREE LIGHT CHAINS: 100.21 MG/L (ref 3.3–19.4)
KAPPA/LAMBDA FREE LIGHT CHAIN RATIO: 0.92 (ref 0.26–1.65)
LAMBDA FREE LIGHT CHAINS QNT: 108.82 MG/L (ref 5.71–26.3)

## 2022-09-24 LAB
ALBUMIN SERPL-MCNC: 3.33 G/DL (ref 3.75–5.01)
ALPHA-1-GLOBULIN: 0.53 G/DL (ref 0.19–0.46)
ALPHA-2-GLOBULIN: 0.98 G/DL (ref 0.48–1.05)
BETA GLOBULIN: 1.15 G/DL (ref 0.48–1.1)
GAMMA: 1.81 G/DL (ref 0.62–1.51)
IMMUNOFIXATION REFLEX: ABNORMAL
PROTEIN ELECTROPHORESIS, SERUM: ABNORMAL
SPE/IFE INTERPRETATION: ABNORMAL
TOTAL PROTEIN: 7.8 G/DL (ref 6.3–8.2)

## 2022-10-11 ENCOUNTER — HOSPITAL ENCOUNTER (OUTPATIENT)
Age: 87
Discharge: HOME | End: 2022-10-11
Payer: MEDICARE

## 2022-10-11 LAB
T4 FREE: 0.65 NG/DL (ref 0.9–1.8)
TSH HIGH SENSITIVITY: 1.16 UIU/ML (ref 0.27–4.2)

## 2022-10-11 PROCEDURE — 84443 ASSAY THYROID STIM HORMONE: CPT

## 2022-10-11 PROCEDURE — 84439 ASSAY OF FREE THYROXINE: CPT

## 2022-10-11 PROCEDURE — 36415 COLL VENOUS BLD VENIPUNCTURE: CPT

## 2022-10-25 ENCOUNTER — HOSPITAL ENCOUNTER (OUTPATIENT)
Age: 87
Discharge: HOME | End: 2022-10-25
Payer: MEDICARE

## 2022-10-25 LAB
FOLATE: 14 NG/ML (ref 3.1–17.5)
VITAMIN B-12: 1959 PG/ML (ref 211–911)

## 2022-10-25 PROCEDURE — 36415 COLL VENOUS BLD VENIPUNCTURE: CPT

## 2022-10-25 PROCEDURE — 82607 VITAMIN B-12: CPT

## 2022-10-25 PROCEDURE — 82746 ASSAY OF FOLIC ACID SERUM: CPT

## 2022-12-02 NOTE — PROGRESS NOTES
ONCOLOGY HEMATOLOGY CARE ([facility])  PROGRESS NOTE      HISTORY OF PRESENT ILLNESS   Jemma Yusuf is a 81 y.o. male with past medical history significant for Arthritis, CAD coronary artery disease, GERD, hyperlipidemia, hypertension, right kidney cyst, thyroid disease, CVA, who presents to the ED due to constipation unresponsive to use of Dulcolax and MiraLAX. Impression   1. No acute intraabdominal process identified. 2. Colonic diverticulosis without CT evidence of diverticulitis. 3. Punctate nonobstructing bilateral renal stones. No obstructive uropathy   identified. 4. No bowel obstruction. 5. Severe atherosclerotic disease. He is noted to have Hgb 8.0, platelet count 853. This is consistent with labs dating back to 2020. Latest Reference Range & Units 8/18/22 02:54   Ferritin 30 - 400 NG/   Iron 59 - 158 ug/dL 49 (L)   UIBC 110 - 370 ug/dL 169   TIBC 250 - 450 ug/dL 218 (L)   Transferrin % 10 - 44 % 22   FOLATE, FOLAT 3.1 - 17.5 NG/ML 7.9   Vitamin B-12 211 - 911 pg/ml >2000 (H)   (L): Data is abnormally low  (H): Data is abnormally high    He has had workup per Dr. Brooks Heading having had a bone marrow biopsy 11/21. 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 He notes unintentional weight loss of 29 pounds in past two months. Reports appetite is poor, but lives alone and cooks for self. Reviewed 5/22 SPEP, [place] Lambda light chains, beta 2 microglobulin. He has known lung nodule for which he is receiving follow up.    8/19/2022 Hg 9.4 and platelet 857. He is awake, alert and in no acute distress. Stated he will be going home today. No new labs today. I advise top follow up with cancer ctr in 3  - 4 weeks.     PHYSICAL EXAM    Vitals: BP (!) 138/58   Pulse 58   Temp 97.9 °F (36.6 °C)   Resp 16   Ht 5' 11\" (1.803 m)   Wt 146 lb 9.7 oz (66.5 kg)   SpO2 96%   BMI 20.45 kg/m²   CONSTITUTIONAL: awake, alert, cooperative, no apparent distress, thin  EYES: sclera clear and + pallor. ENT: Normocephalic, without obvious abnormality, atraumatic  NECK: supple, symmetrical  HEMATOLOGIC/LYMPHATIC: no cervical, supraclavicular or axillary lymphadenopathy   LUNGS: no increased work of breathing and clear to auscultation   CARDIOVASCULAR: regular rate and rhythm, normal S1 and S2, no murmur noted  ABDOMEN: normal bowel sound, soft, non-distended, non-tender, no masses palpated, no hepatosplenomegaly   MUSCULOSKELETAL: full range of motion noted, tone is normal  NEUROLOGIC:  Motor skills grossly intact. SKIN: No jaundice. Skin appears intact   EXTREMITIES: no LE edema    ASSESSMENT/RECOMMENDATION    1. Anemia- multifactorial, chronic disease, nutritional status. Iron studies as above. BMB 2021 as above. I  recommend to follow up at cancer ctr. 2. Thrombocytopenia - chronic. To continue to monitor CBC daily, no signs of bleeding. 3. Unintentional weight loss- CT abdomen/pelvis as above, plan for CT chest for further evaluation. Has history of lung nodule. TSH noted to be 0.015. No acute cardiopulmonary findings. CT chest 8/18/2022: Unchanged ground-glass nodules within the left upper lobe measuring up to  approximately 7 mm. Findings are similar dating back to 11/30/2021. Follow-up recommendations are listed below. RECOMMENDATIONS:  Fleischner Society guidelines for follow-up and management of incidentally  detected subsolid pulmonary nodules:     Solitary ground glass nodule  < 6 mm - No routine follow-up.  > than or equal to 6 mm - CT at 6-12 months to confirm persistence, then CT  every 2 years until 5 years. Solitary part-solid nodules  < 6 mm - No routine follow-up.  > than or equal to 6 mm - CT at 3-6 to confirm persistence. If unchanged and  solid component remains less than 6 mm, annual CT should be performed for 5  years.      Multiple subsolid nodules  < 6 mm - CT at 3-6 months. If stable, consider CT at 2 and 4 years. > than or equal to 6 mm - CT at 3-6 months. Subsequent management based on the most suspicious nodule(s). - Low risk patients include individuals with minimal or absent history of  smoking and other known risk factors. - High risk patients include individuals with a history or smoking or known  risk factors. 4. CKD- nephrology following. I recommend to follow up with us when he is discharged. yes...

## 2022-12-16 ENCOUNTER — HOSPITAL ENCOUNTER (OUTPATIENT)
Age: 87
Discharge: HOME | End: 2022-12-16
Payer: MEDICARE

## 2022-12-16 DIAGNOSIS — N17.9 ACUTE KIDNEY INJURY SUPERIMPOSED ON CKD (HCC): ICD-10-CM

## 2022-12-16 DIAGNOSIS — N18.32 STAGE 3B CHRONIC KIDNEY DISEASE (HCC): ICD-10-CM

## 2022-12-16 DIAGNOSIS — R80.1 PERSISTENT PROTEINURIA: ICD-10-CM

## 2022-12-16 DIAGNOSIS — N18.9 ACUTE KIDNEY INJURY SUPERIMPOSED ON CKD (HCC): ICD-10-CM

## 2022-12-16 LAB
ALBUMIN SERPL-MCNC: 3.7 GM/DL (ref 3.4–5)
ANION GAP SERPL CALCULATED.3IONS-SCNC: 9 MMOL/L (ref 4–16)
BUN BLDV-MCNC: 49 MG/DL (ref 6–23)
CALCIUM SERPL-MCNC: 9.2 MG/DL (ref 8.3–10.6)
CHLORIDE BLD-SCNC: 106 MMOL/L (ref 99–110)
CO2: 26 MMOL/L (ref 21–32)
CREAT SERPL-MCNC: 2.9 MG/DL (ref 0.9–1.3)
GFR SERPL CREATININE-BSD FRML MDRD: 20 ML/MIN/1.73M2
GLUCOSE BLD-MCNC: 126 MG/DL (ref 70–99)
PHOSPHORUS: 3.1 MG/DL (ref 2.5–4.9)
POTASSIUM SERPL-SCNC: 4.2 MMOL/L (ref 3.5–5.1)
SODIUM BLD-SCNC: 141 MMOL/L (ref 135–145)

## 2022-12-16 PROCEDURE — 80069 RENAL FUNCTION PANEL: CPT

## 2022-12-16 PROCEDURE — 36415 COLL VENOUS BLD VENIPUNCTURE: CPT

## 2022-12-20 NOTE — PROGRESS NOTES
Patient Name:  Luca Mckeon  Patient :  1935  Patient MRN:  [id number]     Primary Oncologist: Kim Harvey MD  Referring Provider: Kentrell Uriarte MD     Date of Service: 2023       Chief Complaint:    Chief Complaint   Patient presents with    Follow-up     He came in for follow up visit.     Patient Active Problem List:     Stage 3b chronic kidney disease      Persistent proteinuria     Hypertensive heart and renal disease     History of anemia due to CKD     HPI:   Luca Mckeon is a pleasant 87 year old male patient who was referred for evaluation of anemia. He reported that he has sickle cell trait.  2021 WBC was 5.9, hemoglobin 8.8, hematocrit 29.2, MCV 89.6, platelet 190.  Peripheral smear showed anisocytosis, polychromasia, ovalocytes.  Ferritin was 394, iron 23, TIBC 197, folate 12.9, B12 more than 2000.  TSH was 0.06.  Free T4 was normal at 1.31.  Creatinine was 2.9, total protein 7.4, albumin 3.4.  SPEP showed a faint monoclonal spike noted measuring 500 mg/dL.  VERNELL showed faint monoclonal free lambda light chain proteins.    May 26, 2021 CT abdomen compared to study in 2020, 2019, 2008 showed :  No significant interval change in bilateral renal lesions, most notably a 4.3 cm complicated cystic lesion of the upper pole of the right kidney which is at least a Bosniak 2F lesion.  This dominant lesion is decreased in size since  and is probably benign.  These renal lesions could be further evaluated with renal mass protocol MRI at patient's current renal function if clinically indicated.    2022 bone density: osteopenia. I recommend to take vitamin D and calcium.  I reviewed his records from epic.  He has been anemic at least since .  Hemoglobin in  was normal.  2020 he had episode of rectal bleed and received 3 units of packed red blood cell.  Reportedly he had upper and lower endoscopic study in the first part of  by Dr. Grullon.   4-5 polyps were removed from the colon and he has hiatal hernia. Follow-up in for 5 years was recommended. Creatinine has slowly gone up since 2011. Anemia could be multifactorial.  He is on B12 supplement, Synthroid, Plavix. He has chronic back pain for years related to degenerative joint disease. November 2021 bone marrow, clot section and aspirate smears:   -     Normocellular marrow with trilineage maturing hematopoiesis. -     Monotypic B-cell population is detected by flow cytometry (0.4% of total cells). 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 -     No definitive morphologic or flow cytometric evidence of a plasma cell neoplasm. Peripheral blood smear:   -     Normocytic anemia with anisocytosis. -     Mild thrombocytopenia. Comment:   The monotypic B-cell population has a non-specific immunophenotype that can be seen in monoclonal B-cell lymphocytosis as well as B-cell lymphomas such as marginal zone lymphoma, lymphoplasmacytic lymphoma, and [id number] follicular lymphoma. Deletion of the long arm of chromosome 20 (20q) is a recurring abnormality in myeloid disorders such as MDS but is not considered to be a definitive evidence of myelodysplasia on its own. No definitive morphologic evidence of myelodysplasia is seen in the current specimen. Clinical followup is recommended. Bone marrow differential count:   Blasts:               3%     Flow cytometry: Monotypic B-cells, suspicious for a B-cell lymphoproliferative disorder. Karyotype: 46,XY,del(20)(q11.2q13.1)[5]/46,XY[16]     FISH:   Del(1p): Not Detected   Dup(1q): Not Detected   Gains(5, 9, 15): Not Detected   Del(13q)/-13: Not Detected   Del(17p)(TP53): Not Detected   IGH(Rearrangement): Not Detected     Serum LDH was 150. WBC 6.0, hemoglobin 8.4, MCV 85.4, platelet 687. TSH was 0.06. T4 was 1.31. Creatinine was 2.9.    11/30/2021 CT CAP:  1. No acute findings in the chest, abdomen or pelvis on this unenhanced study. Specifically no pathologically enlarged adenopathy in the chest, abdomen or pelvis. 2. Stable 4.1 cm Bosniak type IIF right renal cyst.  3. 3 mm subsolid nodule in left upper lobe, nonspecific. This can be followed on subsequent surveillance CT. 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 We will continue to monitor closely. He is agreeable to have labs and CT chest prior to next office visit in 6 months. He will discuss with family doctor about the Plavix. Anemia could be multifactorial.  He has also an underlying chronic kidney disease. May 2022 creatinine 2.2, Hg 8.6, WBC 5.4 and platelet 527. Absolute lymphocytes was 2.1. June 2, 2022 CT Chest  showed  Stable ground-glass nodular opacities in the left upper lobe. No new airspace disease identified. Attention to on follow-up imaging is recommended. 5/31/2022 Ferritin: 245 Iron: 53 (L) TIBC: 244 (L) Transferrin %: 22  Lambda Free Light Chains : 107.35 (H)  Kappa/Lambda Fluid C Ratio: 1.03  SPE/VERNELL Interpretation:   VERNELL gel shows a faint band in IgG lambda suggestive of a specific immune response or an early monoclonal protein. Close clinical correlation with VERNELL follow-up is suggested, if clinically indicated. 8/2022 he was in the hospital. Denied any blood transfusion. 8/2022 creatinine 2.5, TSH 0.019 L, free T4 wnl. Hg 9.1, MCV 91.3, plt 145. CT abdomen 8/13/2022  1. No acute intraabdominal process identified. 2. Colonic diverticulosis without CT evidence of diverticulitis. 3. Punctate nonobstructing bilateral renal stones. No obstructive uropathy identified. 4. No bowel obstruction. 5. Severe atherosclerotic disease. CT chest 8/2022:  No acute cardiopulmonary findings. Unchanged ground-glass nodules within the left upper lobe measuring up to approximately 7 mm. Findings are similar dating back to 11/30/2021. Follow-up recommendations are listed below.    RECOMMENDATIONS:  Fleischner Society guidelines for follow-up and management of incidentally detected subsolid pulmonary nodules:  Solitary ground glass nodule  < 6 mm - No routine follow-up.  > than or equal to 6 mm - CT at 6-12 months to confirm persistence, then CT every 2 years until 5 years. 2023 he came in for follow up visit. 2022 Creat 2.9.  2023 TSH 0.064L, free T4 0.86L by Dr Kevin Campbell. He is on low dose synthroid. 2023 I ordered Hb e'phoresis. SPE, serum light chain, anemic w/u. Advise to call for result. 2023 CT scheduled for f/u of lung nodule. He denied any bone pain to suggest multiple myeloma. Denied any nausea, vomiting or diarrhea. No fever or chills. No chest pain, shortness of breath or palpitation. No headache or dizzy spell. No melena or hematochezia. Denied any dysuria or hematuria. Past Medical History:   Diagnosis Date    Arthritis     CAD (coronary artery disease)     GERD (gastroesophageal reflux disease)     Hyperlipidemia     Hypertension     Kidney cysts     Right kidney    Thyroid disease     Unspecified cerebral artery occlusion with cerebral infarction      Past Surgical History:   Procedure Laterality Date    CATARACT REMOVAL      COLONOSCOPY  13    divertics    COLONOSCOPY N/A 2020    COLONOSCOPY DIAGNOSTIC performed by Pallavi Espinosa MD at [address] [place] [address], DIAGNOSTIC  13    egd: normal    FRACTURE SURGERY      PROSTATE BIOPSY      UPPER GASTROINTESTINAL ENDOSCOPY N/A 2020    EGD DIAGNOSTIC ONLY performed by Pallavi Espinosa MD at [facility] ENDOSCOPY     Social History:   He denied any history of smoking. No alcohol or illicit drug use. He has 4 children. 2 daughters  of heart problem. 1 son  of heart problem. Family History:   1 living daughter had breast cancer. Review of Systems: The remainder of the review of system is unremarkable.      Vital Signs: /60 (Site: Left Upper Arm, Position: Sitting, Cuff Size: Medium Adult)   Pulse 67   Temp 98.7 °F (37.1 °C) (Temporal)   Ht 5' 11\" (1.803 m)   Wt 158 lb 12.8 oz (72 kg)   SpO2 97%   BMI 22.15 kg/m²      Physical Exam:  CONSTITUTIONAL: awake, alert, cooperative, no apparent distress   EYES: pupils equal, round and reactive to light. Sclera clear and + pallor  ENT: Normocephalic, without obvious abnormality, atraumatic  NECK: supple, symmetrical, no jugular venous distension and no carotid bruits   HEMATOLOGIC/LYMPHATIC: no cervical, supraclavicular or axillary lymphadenopathy   LUNGS: no increased work of breathing and clear to auscultation   CARDIOVASCULAR: regular rate and rhythm, normal S1 and S2, no murmur noted  ABDOMEN: normal bowel sound, soft, non-distended, non-tender, No palpable masses. No rebound or guarding. MUSCULOSKELETAL: full range of motion noted, tone is normal  NEUROLOGIC: Motor and sensory grossly intact. CN II - XII  grossly intact   SKIN: No jaundice. Dry skin. EXTREMITIES: no LE edema  or cyanosis. Homans negative.      Labs:  Hematology:  Lab Results   Component Value Date    WBC 5.5 09/19/2022    RBC 3.28 (L) 09/19/2022    HGB 9.2 (L) 09/19/2022    HCT 29.4 (L) 09/19/2022    MCV 89.6 09/19/2022    MCH 28.0 09/19/2022    MCHC 31.3 (L) 09/19/2022    RDW 14.3 09/19/2022     09/19/2022    MPV 9.0 09/19/2022    BANDSPCT 2 (L) 05/31/2022    SEGSPCT 43.4 09/19/2022    EOSRELPCT 1.8 09/19/2022    BASOPCT 0.5 09/19/2022    LYMPHOPCT 44.4 (H) 09/19/2022    MONOPCT 9.7 (H) 09/19/2022    BANDABS 0.11 05/31/2022    SEGSABS 2.4 09/19/2022    EOSABS 0.1 09/19/2022    BASOSABS 0.0 09/19/2022    LYMPHSABS 2.4 09/19/2022    MONOSABS 0.5 09/19/2022    DIFFTYPE AUTOMATED DIFFERENTIAL 09/19/2022    ANISOCYTOSIS 1+ 09/27/2021    POLYCHROM 1+ 09/27/2021    PLTM FEW 09/27/2021     Lab Results   Component Value Date    ESR 82 (H) 10/27/2021     Chemistry:  Lab Results   Component Value Date     12/16/2022    K 4.2 12/16/2022     12/16/2022    CO2 26 12/16/2022    BUN 49 (H) 12/16/2022    CREATININE 2.9 (H) 12/16/2022    GLUCOSE 126 (H) 12/16/2022    CALCIUM 9.2 12/16/2022    PROT 7.8 09/19/2022    PROT 7.3 09/19/2022    LABALBU 3.7 12/16/2022    BILITOT 0.3 09/19/2022    ALKPHOS 78 09/19/2022    AST 18 09/19/2022    ALT 14 09/19/2022    LABGLOM 20 (L) 12/16/2022    GFRAA 33 (L) 09/19/2022    PHOS 3.1 12/16/2022    MG 1.8 08/19/2022    POCGLU 101 (H) 08/19/2022     Lab Results   Component Value Date    TSHHS 0.064 (L) 01/16/2023    T4FREE 0.86 (L) 01/16/2023    FT3 2.1 (L) 08/17/2022     Immunology:  Lab Results   Component Value Date    PROT 7.8 09/19/2022    PROT 7.3 09/19/2022    SPEP  10/27/2021     INTERPRETATION - A faint free lambda light chains monoclonal band is seen. 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 SAF    ALBUMINELP 3.2 10/27/2021    LABALPH 0.53 (H) 09/19/2022    LABALPH 0.98 09/19/2022    LABBETA 1.15 (H) 09/19/2022    GAMGLOB 2.1 (H) 10/27/2021     Coagulation Panel:  Lab Results   Component Value Date    PROTIME 14.7 (H) 02/08/2020    INR 1.21 02/08/2020    APTT 36.2 02/08/2020     Anemia Panel:  Lab Results   Component Value Date    [address] (H) 10/25/2022    FOLATE 14.0 10/25/2022      Observations:  PHQ-9 Total Score: 0 (1/19/2023  3:32 PM)     Assessment & Plan:  1. He has chronic anemia. Plavix was stopped. He had upper and lower endoscopic study in the first part of 2021. BM Study in November 2021 was grossly unremarkable except for small monoclonal B-cell lymphocytosis. CT chest abdomen pelvis in summer 2021 were reviewed. Likely anemia is multifactorial. LDH was unremarkable. 1/2023 TSH 0.064L, free T4 0.86L by Dr Valeriy Sultana. He is on low dose synthroid. 1/19/2023 I ordered Hb e'phoresis. SPE, serum light chain, anemic w/u.      2. He has chronic kidney disease. 12/2022 Creat 2.9.    3.  He has monoclonal gammopathy of unknown significance. I will continue to monitor SPEP/VERNELL, serum light chain study. 4.  He has lung nodule. CT chest in August 2022 was reviewed. 2/2023 CT scheduled for f/u of lung nodule. Return to clinic in 3 weeks. All of his questions has been answered for today. I have discussed the above stated plan with the patient and they verbalized understanding and agreed with the plan.

## 2023-01-16 ENCOUNTER — HOSPITAL ENCOUNTER (OUTPATIENT)
Age: 88
Discharge: HOME | End: 2023-01-16
Payer: MEDICARE

## 2023-01-16 LAB
T4 FREE: 0.86 NG/DL (ref 0.9–1.8)
TSH HIGH SENSITIVITY: 0.06 UIU/ML (ref 0.27–4.2)

## 2023-01-16 PROCEDURE — 84443 ASSAY THYROID STIM HORMONE: CPT

## 2023-01-16 PROCEDURE — 84439 ASSAY OF FREE THYROXINE: CPT

## 2023-01-16 PROCEDURE — 36415 COLL VENOUS BLD VENIPUNCTURE: CPT

## 2023-01-19 ENCOUNTER — HOSPITAL ENCOUNTER (OUTPATIENT)
Dept: INFUSION THERAPY | Age: 88
Discharge: HOME | End: 2023-01-19
Payer: MEDICARE

## 2023-01-19 ENCOUNTER — OFFICE VISIT (OUTPATIENT)
Dept: ONCOLOGY | Age: 88
End: 2023-01-19
Payer: MEDICARE

## 2023-01-19 VITALS
HEIGHT: 71 IN | TEMPERATURE: 98.7 F | HEART RATE: 67 BPM | BODY MASS INDEX: 22.23 KG/M2 | DIASTOLIC BLOOD PRESSURE: 60 MMHG | SYSTOLIC BLOOD PRESSURE: 124 MMHG | WEIGHT: 158.8 LBS | OXYGEN SATURATION: 97 %

## 2023-01-19 DIAGNOSIS — D47.2 MGUS (MONOCLONAL GAMMOPATHY OF UNKNOWN SIGNIFICANCE): Primary | ICD-10-CM

## 2023-01-19 DIAGNOSIS — D64.9 ANEMIA, UNSPECIFIED TYPE: ICD-10-CM

## 2023-01-19 DIAGNOSIS — R91.1 LUNG NODULE: ICD-10-CM

## 2023-01-19 DIAGNOSIS — D47.2 MGUS (MONOCLONAL GAMMOPATHY OF UNKNOWN SIGNIFICANCE): ICD-10-CM

## 2023-01-19 LAB
ALBUMIN SERPL-MCNC: 3.9 GM/DL (ref 3.4–5)
ALP BLD-CCNC: 81 IU/L (ref 40–129)
ALT SERPL-CCNC: 14 U/L (ref 10–40)
ANION GAP SERPL CALCULATED.3IONS-SCNC: 9 MMOL/L (ref 4–16)
AST SERPL-CCNC: 20 IU/L (ref 15–37)
BASOPHILS ABSOLUTE: 0 K/CU MM
BASOPHILS RELATIVE PERCENT: 0.2 % (ref 0–1)
BILIRUB SERPL-MCNC: 0.3 MG/DL (ref 0–1)
BUN BLDV-MCNC: 49 MG/DL (ref 6–23)
CALCIUM SERPL-MCNC: 9.1 MG/DL (ref 8.3–10.6)
CHLORIDE BLD-SCNC: 108 MMOL/L (ref 99–110)
CO2: 25 MMOL/L (ref 21–32)
CREAT SERPL-MCNC: 2.8 MG/DL (ref 0.9–1.3)
DIFFERENTIAL TYPE: ABNORMAL
EOSINOPHILS ABSOLUTE: 0.1 K/CU MM
EOSINOPHILS RELATIVE PERCENT: 1.6 % (ref 0–3)
GFR SERPL CREATININE-BSD FRML MDRD: 21 ML/MIN/1.73M2
GLUCOSE BLD-MCNC: 108 MG/DL (ref 70–99)
HCT VFR BLD CALC: 27.3 % (ref 42–52)
HEMOGLOBIN: 8.5 GM/DL (ref 13.5–18)
LACTATE DEHYDROGENASE: 216 IU/L (ref 120–246)
LYMPHOCYTES ABSOLUTE: 2.4 K/CU MM
LYMPHOCYTES RELATIVE PERCENT: 40.9 % (ref 24–44)
MCH RBC QN AUTO: 28.3 PG (ref 27–31)
MCHC RBC AUTO-ENTMCNC: 31.1 % (ref 32–36)
MCV RBC AUTO: 91 FL (ref 78–100)
MONOCYTES ABSOLUTE: 0.6 K/CU MM
MONOCYTES RELATIVE PERCENT: 11.1 % (ref 0–4)
PDW BLD-RTO: 15.4 % (ref 11.7–14.9)
PLATELET # BLD: 97 K/CU MM (ref 140–440)
PMV BLD AUTO: 8.7 FL (ref 7.5–11.1)
POTASSIUM SERPL-SCNC: 4.4 MMOL/L (ref 3.5–5.1)
RBC # BLD: 3 M/CU MM (ref 4.6–6.2)
SEGMENTED NEUTROPHILS ABSOLUTE COUNT: 2.7 K/CU MM
SEGMENTED NEUTROPHILS RELATIVE PERCENT: 46.2 % (ref 36–66)
SODIUM BLD-SCNC: 142 MMOL/L (ref 135–145)
TOTAL PROTEIN: 7.6 GM/DL (ref 6.4–8.2)
TOTAL PROTEIN: 7.6 GM/DL (ref 6.4–8.2)
WBC # BLD: 5.7 K/CU MM (ref 4–10.5)

## 2023-01-19 PROCEDURE — 80053 COMPREHEN METABOLIC PANEL: CPT

## 2023-01-19 PROCEDURE — 99214 OFFICE O/P EST MOD 30 MIN: CPT | Performed by: INTERNAL MEDICINE

## 2023-01-19 PROCEDURE — 84155 ASSAY OF PROTEIN SERUM: CPT

## 2023-01-19 PROCEDURE — 36415 COLL VENOUS BLD VENIPUNCTURE: CPT

## 2023-01-19 PROCEDURE — 1123F ACP DISCUSS/DSCN MKR DOCD: CPT | Performed by: INTERNAL MEDICINE

## 2023-01-19 PROCEDURE — 83883 ASSAY NEPHELOMETRY NOT SPEC: CPT

## 2023-01-19 PROCEDURE — 99211 OFF/OP EST MAY X REQ PHY/QHP: CPT

## 2023-01-19 PROCEDURE — 83615 LACTATE (LD) (LDH) ENZYME: CPT

## 2023-01-19 PROCEDURE — 84165 PROTEIN E-PHORESIS SERUM: CPT

## 2023-01-19 PROCEDURE — 86320 SERUM IMMUNOELECTROPHORESIS: CPT

## 2023-01-19 PROCEDURE — 85025 COMPLETE CBC W/AUTO DIFF WBC: CPT

## 2023-01-19 RX ORDER — TRAZODONE HYDROCHLORIDE 50 MG/1
TABLET ORAL
COMMUNITY
Start: 2023-01-03

## 2023-01-19 RX ORDER — DOXAZOSIN 8 MG/1
TABLET ORAL
COMMUNITY
Start: 2023-01-03

## 2023-01-19 RX ORDER — LEVOTHYROXINE SODIUM 0.05 MG/1
TABLET ORAL
COMMUNITY
Start: 2022-10-18

## 2023-01-19 RX ORDER — CLORAZEPATE DIPOTASSIUM 3.75 MG/1
TABLET ORAL
COMMUNITY
Start: 2022-10-29

## 2023-01-19 ASSESSMENT — PATIENT HEALTH QUESTIONNAIRE - PHQ9
SUM OF ALL RESPONSES TO PHQ QUESTIONS 1-9: 0
1. LITTLE INTEREST OR PLEASURE IN DOING THINGS: 0
2. FEELING DOWN, DEPRESSED OR HOPELESS: 0
SUM OF ALL RESPONSES TO PHQ QUESTIONS 1-9: 0
SUM OF ALL RESPONSES TO PHQ9 QUESTIONS 1 & 2: 0

## 2023-01-19 NOTE — PROGRESS NOTES
MA Rooming Questions  Patient: Xander Herrmann  MRN: [id number]    Date: 1/19/2023        1. Do you have any new issues?   no         2. Do you need any refills on medications?    no    3. Have you had any imaging done since your last visit? yes - pcp     4. Have you been hospitalized or seen in the emergency room since your last visit here?   no    5. Did the patient have a depression screening completed today?  Yes    PHQ-9 Total Score: 0 (1/19/2023  3:32 PM)       PHQ-9 Given to (if applicable):               PHQ-9 Score (if applicable):                     [] Positive     []  Negative              Does question #9 need addressed (if applicable)                     [] Yes    []  No               Alyson Greene CMA

## 2023-01-23 ENCOUNTER — TELEPHONE (OUTPATIENT)
Dept: ONCOLOGY | Age: 88
End: 2023-01-23

## 2023-01-23 LAB
ALBUMIN ELP: 3.3 GM/DL (ref 3.2–5.6)
ALPHA-1-GLOBULIN: 0.4 GM/DL (ref 0.1–0.4)
ALPHA-2-GLOBULIN: 0.8 GM/DL (ref 0.4–1.2)
BETA GLOBULIN: 1.3 GM/DL (ref 0.5–1.3)
GAMMA GLOBULIN: 1.8 GM/DL (ref 0.5–1.6)
SPEP INTERPRETATION: ABNORMAL
SPEP INTERPRETATION: NORMAL
TOTAL PROTEIN: 7.6 GM/DL (ref 6.4–8.2)

## 2023-01-23 NOTE — TELEPHONE ENCOUNTER
1/23/23 - spoke w/pt 2/2/23 CT scan at [facility] arrival time of 10:30 am (no prep -no contrast).

## 2023-02-02 ENCOUNTER — HOSPITAL ENCOUNTER (OUTPATIENT)
Dept: CT IMAGING | Age: 88
Discharge: HOME | End: 2023-02-02
Payer: MEDICARE

## 2023-02-02 DIAGNOSIS — R91.1 LUNG NODULE: ICD-10-CM

## 2023-02-02 PROCEDURE — 71250 CT THORAX DX C-: CPT

## 2023-02-14 ENCOUNTER — HOSPITAL ENCOUNTER (OUTPATIENT)
Dept: INFUSION THERAPY | Age: 88
Discharge: HOME | End: 2023-02-14
Payer: MEDICARE

## 2023-02-14 ENCOUNTER — OFFICE VISIT (OUTPATIENT)
Dept: ONCOLOGY | Age: 88
End: 2023-02-14
Payer: MEDICARE

## 2023-02-14 VITALS
HEIGHT: 71 IN | DIASTOLIC BLOOD PRESSURE: 61 MMHG | SYSTOLIC BLOOD PRESSURE: 135 MMHG | BODY MASS INDEX: 22.43 KG/M2 | OXYGEN SATURATION: 98 % | HEART RATE: 62 BPM | TEMPERATURE: 97.9 F | WEIGHT: 160.2 LBS

## 2023-02-14 DIAGNOSIS — D64.9 ANEMIA, UNSPECIFIED TYPE: Primary | ICD-10-CM

## 2023-02-14 PROCEDURE — 1123F ACP DISCUSS/DSCN MKR DOCD: CPT | Performed by: INTERNAL MEDICINE

## 2023-02-14 PROCEDURE — 99211 OFF/OP EST MAY X REQ PHY/QHP: CPT

## 2023-02-14 PROCEDURE — 99213 OFFICE O/P EST LOW 20 MIN: CPT | Performed by: INTERNAL MEDICINE

## 2023-02-14 NOTE — PROGRESS NOTES
MA Rooming Questions  Patient: Chandrika Layne  MRN: [id number]    Date: 2/14/2023        1. Do you have any new issues? yes - neuropathy in feet          2. Do you need any refills on medications?    no    3. Have you had any imaging done since your last visit? yes - ct scan and labs     4. Have you been hospitalized or seen in the emergency room since your last visit here?   no    5. Did the patient have a depression screening completed today?  Yes    No data recorded     PHQ-9 Given to (if applicable):               PHQ-9 Score (if applicable):                     [] Positive     []  Negative              Does question #9 need addressed (if applicable)                     [] Yes    []  No               Santa Mahmood, CMA

## 2023-02-14 NOTE — PROGRESS NOTES
Patient Name:  Harshad Bedolla  Patient :  1935  Patient MRN:  [id number]     Primary Oncologist: Fredis Morgan MD  Referring Provider: Cynthia Dhillon MD     Date of Service: 2023       Chief Complaint:    No chief complaint on file. He came in for follow up visit. Patient Active Problem List:     Stage 3b chronic kidney disease      Persistent proteinuria     Hypertensive heart and renal disease     History of anemia due to CKD     HPI:   Joanna Crenshaw is a pleasant 81year old male patient who was referred for evaluation of anemia. He reported that he has sickle cell trait. 2021 WBC was 5.9, hemoglobin 8.8, hematocrit 29.2, MCV 89.6, platelet 666. Peripheral smear showed anisocytosis, polychromasia, ovalocytes. Ferritin was 394, iron 23, TIBC 197, folate 12.9, B12 more than 2000. TSH was 0.06. Free T4 was normal at 1.31. Creatinine was 2.9, total protein 7.4, albumin 3.4. SPEP showed a faint monoclonal spike noted measuring 500 mg/dL. VERNELL showed faint monoclonal free lambda light chain proteins. May 26, 2021 CT abdomen compared to study in 2020, 2019, 2008 showed :  No significant interval change in bilateral renal lesions, most notably a 4.3 cm complicated cystic lesion of the upper pole of the right kidney which is at least a Bosniak 2F lesion. This dominant lesion is decreased in size since  and is probably benign. These renal lesions could be further evaluated with renal mass protocol MRI at patient's current renal function if clinically indicated. 2022 bone density: osteopenia. I recommend to take vitamin D and calcium. I reviewed his records from epic. He has been anemic at least since . Hemoglobin in  was normal.  2020 he had episode of rectal bleed and received 3 units of packed red blood cell. Reportedly he had upper and lower endoscopic study in the first part of  by Dr. Marlon Belcher.   4-5 polyps were removed from the colon and he has hiatal hernia. Follow-up in for 5 years was recommended. Creatinine has slowly gone up since 2011. Anemia could be multifactorial.  He is on B12 supplement, Synthroid, Plavix. He has chronic back pain for years related to degenerative joint disease. November 2021 bone marrow, clot section and aspirate smears:   -     Normocellular marrow with trilineage maturing hematopoiesis. -     Monotypic B-cell population is detected by flow cytometry (0.4% of total cells). 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 -     No definitive morphologic or flow cytometric evidence of a plasma cell neoplasm. Peripheral blood smear:   -     Normocytic anemia with anisocytosis. -     Mild thrombocytopenia. Comment:   The monotypic B-cell population has a non-specific immunophenotype that can be seen in monoclonal B-cell lymphocytosis as well as B-cell lymphomas such as marginal zone lymphoma, lymphoplasmacytic lymphoma, and HN11-YXIOLJIM follicular lymphoma. Deletion of the long arm of chromosome 20 (20q) is a recurring abnormality in myeloid disorders such as MDS but is not considered to be a definitive evidence of myelodysplasia on its own. No definitive morphologic evidence of myelodysplasia is seen in the current specimen. Clinical followup is recommended. Bone marrow differential count:   Blasts:               3%     Flow cytometry: Monotypic B-cells, suspicious for a B-cell lymphoproliferative disorder. Karyotype: 46,XY,del(20)(q11.2q13.1)[5]/46,XY[16]     FISH:   Del(1p): Not Detected   Dup(1q): Not Detected   Gains(5, 9, 15): Not Detected   Del(13q)/-13: Not Detected   Del(17p)(TP53): Not Detected   IGH(Rearrangement): Not Detected     Serum LDH was 150. WBC 6.0, hemoglobin 8.4, MCV 85.4, platelet 187. TSH was 0.06. T4 was 1.31. Creatinine was 2.9.    11/30/2021 CT CAP:  1. No acute findings in the chest, abdomen or pelvis on this unenhanced study.   Specifically no pathologically enlarged adenopathy in the chest, abdomen or pelvis. 2. Stable 4.1 cm Bosniak type IIF right renal cyst.  3. 3 mm subsolid nodule in left upper lobe, nonspecific. This can be followed on subsequent surveillance CT. 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 May 2022 creatinine 2.2, Hg 8.6, WBC 5.4 and platelet 834. Absolute lymphocytes was 2.1. June 2, 2022 CT Chest: Stable ground-glass nodular opacities in the left upper lobe. No new airspace disease identified. Attention to on follow-up imaging is recommended. 5/31/2022 Ferritin: 245 Iron: 53 (L) TIBC: 244 (L) Transferrin %: 22  Lambda Free Light Chains : 107.35 (H)  Kappa/Lambda Fluid C Ratio: 1.03  SPE/VERNELL Interpretation:   VERNELL gel shows a faint band in IgG lambda suggestive of a specific immune response or an early monoclonal protein. 8/2022 he was in the hospital. Denied any blood transfusion. 8/2022 creatinine 2.5, TSH 0.019 L, free T4 wnl. Hg 9.1, MCV 91.3, plt 145. CT abdomen 8/13/2022  1. No acute intraabdominal process identified. 2. Colonic diverticulosis without CT evidence of diverticulitis. 3. Punctate nonobstructing bilateral renal stones. No obstructive uropathy identified. 4. No bowel obstruction. 5. Severe atherosclerotic disease. CT chest 8/2022:  No acute cardiopulmonary findings. Unchanged ground-glass nodules within the left upper lobe measuring up to approximately 7 mm. Findings are similar dating back to 11/30/2021. Follow-up recommendations are listed below. RECOMMENDATIONS:  Fleischner Society guidelines for follow-up and management of incidentally detected subsolid pulmonary nodules:  Solitary ground glass nodule  < 6 mm - No routine follow-up.  > than or equal to 6 mm - CT at 6-12 months to confirm persistence, then CT every 2 years until 5 years. 1/19/2023 he came in for follow up visit. 12/2022 Creat 2.9.  1/2023 TSH 0.064L, free T4 0.86L by Dr Radha Sanchez.  He is on low dose synthroid. He denied any bone pain to suggest multiple myeloma. 2023 he came in for a follow up visit. 2023 WBC 5.7, Hg 8.5, MCV 27.3, plat 97. Creat 2.8. LFTs wnl. , SPEP 400 mg IgG lambda. [address] 114,67, 115 [facility] 120.88. K/L ratio 0.95. I reviewed CT chest report with him. 2203 CT chest  Stable exam of the chest.  Unchanged 7 mm nodule in the lingula since the earliest available exam in . Recommend noncontrast CT follow-up in 2 years. He has seen cardiologist and complains of numbness and tingling to both fee since 2022. He is agreeable to see neurologist.  3/3/2023 he will have prostate biopsy    Denied any nausea, vomiting or diarrhea. No fever or chills. No chest pain, shortness of breath or palpitation. No headache or dizzy spell. No melena or hematochezia. Denied any dysuria or hematuria. Past Medical History:   Diagnosis Date    Arthritis     CAD (coronary artery disease)     GERD (gastroesophageal reflux disease)     Hyperlipidemia     Hypertension     Kidney cysts     Right kidney    Thyroid disease     Unspecified cerebral artery occlusion with cerebral infarction      Past Surgical History:   Procedure Laterality Date    CATARACT REMOVAL      COLONOSCOPY  13    divertics    COLONOSCOPY N/A 2020    COLONOSCOPY DIAGNOSTIC performed by Vianey Aguero MD at [address] [place] [address], COLON, DIAGNOSTIC  13    egd: normal    FRACTURE SURGERY      PROSTATE BIOPSY      UPPER GASTROINTESTINAL ENDOSCOPY N/A 2020    EGD DIAGNOSTIC ONLY performed by Vianey Aguero MD at [facility] [place] [place] ENDOSCOPY     Social History:   He denied any history of smoking. No alcohol or illicit drug use. He has 4 children. 2 daughters  of heart problem. 1 son  of heart problem. Family History:   1 living daughter had breast cancer. Review of Systems: The remainder of the review of system is unremarkable.      Vital Signs: There were no vitals taken for this visit. Physical Exam:  CONSTITUTIONAL: awake, alert, cooperative, no apparent distress   EYES: pupils equal, round and reactive to light. Sclera clear and + pallor  ENT: Normocephalic, without obvious abnormality, atraumatic  NECK: supple, symmetrical, no jugular venous distension and no carotid bruits   HEMATOLOGIC/LYMPHATIC: no cervical, supraclavicular or axillary lymphadenopathy   LUNGS: no increased work of breathing and clear to auscultation   CARDIOVASCULAR: regular rate and rhythm, normal S1 and S2, no murmur noted  ABDOMEN: normal bowel sound, soft, non-distended, non-tender, No palpable masses. No rebound or guarding. MUSCULOSKELETAL: full range of motion noted, tone is normal  NEUROLOGIC: Motor and sensory grossly intact. CN II - XII  grossly intact   SKIN: No jaundice. Dry skin. EXTREMITIES: no LE edema  or cyanosis. Homans negative.      Labs:  Hematology:  Lab Results   Component Value Date    WBC 5.7 01/19/2023    RBC 3.00 (L) 01/19/2023    HGB 8.5 (L) 01/19/2023    HCT 27.3 (L) 01/19/2023    MCV 91.0 01/19/2023    MCH 28.3 01/19/2023    MCHC 31.1 (L) 01/19/2023    RDW 15.4 (H) 01/19/2023    PLT 97 (L) 01/19/2023    MPV 8.7 01/19/2023    BANDSPCT 2 (L) 05/31/2022    SEGSPCT 46.2 01/19/2023    EOSRELPCT 1.6 01/19/2023    BASOPCT 0.2 01/19/2023    LYMPHOPCT 40.9 01/19/2023    MONOPCT 11.1 (H) 01/19/2023    BANDABS 0.11 05/31/2022    SEGSABS 2.7 01/19/2023    EOSABS 0.1 01/19/2023    BASOSABS 0.0 01/19/2023    LYMPHSABS 2.4 01/19/2023    MONOSABS 0.6 01/19/2023    DIFFTYPE AUTOMATED DIFFERENTIAL 01/19/2023    ANISOCYTOSIS 1+ 09/27/2021    POLYCHROM 1+ 09/27/2021    PLTM FEW 09/27/2021     Lab Results   Component Value Date    ESR 82 (H) 10/27/2021     Chemistry:  Lab Results   Component Value Date     01/19/2023    K 4.4 01/19/2023     01/19/2023    CO2 25 01/19/2023    BUN 49 (H) 01/19/2023    CREATININE 2.8 (H) 01/19/2023    GLUCOSE 108 (H) 01/19/2023    CALCIUM 9.1 01/19/2023    PROT 7.6 01/19/2023    PROT 7.6 01/19/2023    LABALBU 3.9 01/19/2023    BILITOT 0.3 01/19/2023    ALKPHOS 81 01/19/2023    AST 20 01/19/2023    ALT 14 01/19/2023    LABGLOM 21 (L) 01/19/2023    GFRAA 33 (L) 09/19/2022    PHOS 3.1 12/16/2022    MG 1.8 08/19/2022    POCGLU 101 (H) 08/19/2022     Lab Results   Component Value Date    TSHHS 0.064 (L) 01/16/2023    T4FREE 0.86 (L) 01/16/2023    FT3 2.1 (L) 08/17/2022     Immunology:  Lab Results   Component Value Date    PROT 7.6 01/19/2023    PROT 7.6 01/19/2023    SPEP  01/19/2023     INTERPRETATION - Monoclonal gammopathy, 400 mg/dL, identified as IgG lambda on concurrent VERNELL. Denise Fairchild MD    SPEP  01/19/2023     INTERPRETATION - Monoclonal gammopathy, IgG lambda. Denise Fairchild MD    ALBUMINELP 3.3 01/19/2023    LABALPH 0.4 01/19/2023    LABALPH 0.8 01/19/2023    LABBETA 1.3 01/19/2023    GAMGLOB 1.8 (H) 01/19/2023     Coagulation Panel:  Lab Results   Component Value Date    PROTIME 14.7 (H) 02/08/2020    INR 1.21 02/08/2020    APTT 36.2 02/08/2020     Anemia Panel:  Lab Results   Component Value Date    [address] (H) 10/25/2022    FOLATE 14.0 10/25/2022      Observations:  No data recorded     Assessment & Plan:  1. He has chronic anemia. Plavix was stopped in 2020. He had upper and lower endoscopic study in the first part of 2021. BM Study in November 2021 was grossly unremarkable except for small monoclonal B-cell lymphocytosis. CT chest abdomen pelvis in summer 2021 were reviewed. Likely anemia is multifactorial. LDH was unremarkable. 1/2023 TSH 0.064L, free T4 0.86L by Dr Young Leader. He is on low dose synthroid. 1/19/2023 WBC 5.7, Hg 8.5, MCV 27.3, plat 97. Creat 2.8. LFTs wnl. , SPEP 400 mg IgG lambda. [address] 114,67, 115 [facility] 120.88. K/L ratio 0.95.    2. He has chronic kidney disease. 12/2022 Creat 2.9.    3. He has monoclonal gammopathy of unknown significance. 1/19/2023 WBC 5.7, Hg 8.5, MCV 27.3, plat 97. Creat 2.8. LFTs wnl. , SPEP 400 mg IgG lambda. [address] 114,67, 115 [facility] 120.88. K/L ratio 0.95. I will continue to monitor SPEP/VERNELL, serum light chain study. 4.  He has lung nodule. CT chest in August 2022 was reviewed. I reviewed CT chest report with him. 2/2/2203 CT chest:  Stable exam of the chest.  Unchanged 7 mm nodule in the lingula since the earliest available exam in 2021. Recommend noncontrast CT follow-up in 2 years. 5.  He has seen cardiologist and complains of numbness and tingling to both fee since 8/2022. He is agreeable to see neurologist.    6. 3/3/2023 he will have prostate biopsy. Return to clinic in 7 weeks. All of his questions has been answered for today. I have discussed the above stated plan with the patient and they verbalized understanding and agreed with the plan.

## 2023-03-24 ENCOUNTER — HOSPITAL ENCOUNTER (OUTPATIENT)
Age: 88
Discharge: HOME | End: 2023-03-24
Payer: MEDICARE

## 2023-03-24 LAB
ALBUMIN SERPL-MCNC: 3.8 GM/DL (ref 3.4–5)
ANION GAP SERPL CALCULATED.3IONS-SCNC: 10 MMOL/L (ref 4–16)
BUN SERPL-MCNC: 49 MG/DL (ref 6–23)
CALCIUM SERPL-MCNC: 9.2 MG/DL (ref 8.3–10.6)
CHLORIDE BLD-SCNC: 106 MMOL/L (ref 99–110)
CO2: 24 MMOL/L (ref 21–32)
CREAT SERPL-MCNC: 3 MG/DL (ref 0.9–1.3)
GFR SERPL CREATININE-BSD FRML MDRD: 20 ML/MIN/1.73M2
GLUCOSE SERPL-MCNC: 99 MG/DL (ref 70–99)
PHOSPHORUS: 3.7 MG/DL (ref 2.5–4.9)
POTASSIUM SERPL-SCNC: 4.2 MMOL/L (ref 3.5–5.1)
SODIUM BLD-SCNC: 140 MMOL/L (ref 135–145)

## 2023-03-24 PROCEDURE — 80069 RENAL FUNCTION PANEL: CPT

## 2023-03-24 PROCEDURE — 36415 COLL VENOUS BLD VENIPUNCTURE: CPT

## 2023-03-24 PROCEDURE — 83970 ASSAY OF PARATHORMONE: CPT

## 2023-03-27 LAB — PTH-INTACT SERPL-MCNC: 31 PG/ML (ref 15–65)

## 2023-03-28 PROBLEM — N18.32 STAGE 3B CHRONIC KIDNEY DISEASE (HCC): Status: RESOLVED | Noted: 2020-10-29 | Resolved: 2023-03-28

## 2023-03-28 PROBLEM — N18.4 CHRONIC KIDNEY DISEASE, STAGE IV (SEVERE) (HCC): Status: ACTIVE | Noted: 2023-03-28

## 2023-04-06 ENCOUNTER — HOSPITAL ENCOUNTER (OUTPATIENT)
Dept: INFUSION THERAPY | Age: 88
Discharge: HOME | End: 2023-04-06
Payer: MEDICARE

## 2023-04-06 ENCOUNTER — OFFICE VISIT (OUTPATIENT)
Dept: ONCOLOGY | Age: 88
End: 2023-04-06
Payer: MEDICARE

## 2023-04-06 VITALS
HEIGHT: 71 IN | OXYGEN SATURATION: 97 % | RESPIRATION RATE: 16 BRPM | WEIGHT: 160.8 LBS | BODY MASS INDEX: 22.51 KG/M2 | HEART RATE: 58 BPM | DIASTOLIC BLOOD PRESSURE: 69 MMHG | SYSTOLIC BLOOD PRESSURE: 159 MMHG | TEMPERATURE: 97.7 F

## 2023-04-06 DIAGNOSIS — D64.9 ANEMIA, UNSPECIFIED TYPE: Primary | ICD-10-CM

## 2023-04-06 DIAGNOSIS — D64.9 ANEMIA, UNSPECIFIED TYPE: ICD-10-CM

## 2023-04-06 LAB
ALBUMIN SERPL-MCNC: 3.9 GM/DL (ref 3.4–5)
ALP BLD-CCNC: 89 IU/L (ref 40–129)
ALT SERPL-CCNC: 8 U/L (ref 10–40)
ANION GAP SERPL CALCULATED.3IONS-SCNC: 9 MMOL/L (ref 4–16)
AST SERPL-CCNC: 16 IU/L (ref 15–37)
BASOPHILS ABSOLUTE: 0 K/CU MM
BASOPHILS RELATIVE PERCENT: 0.4 % (ref 0–1)
BILIRUB SERPL-MCNC: 0.3 MG/DL (ref 0–1)
BUN SERPL-MCNC: 44 MG/DL (ref 6–23)
CALCIUM SERPL-MCNC: 9 MG/DL (ref 8.3–10.6)
CHLORIDE BLD-SCNC: 106 MMOL/L (ref 99–110)
CO2: 24 MMOL/L (ref 21–32)
CREAT SERPL-MCNC: 3 MG/DL (ref 0.9–1.3)
DIFFERENTIAL TYPE: ABNORMAL
EOSINOPHILS ABSOLUTE: 0.1 K/CU MM
EOSINOPHILS RELATIVE PERCENT: 2.6 % (ref 0–3)
GFR SERPL CREATININE-BSD FRML MDRD: 20 ML/MIN/1.73M2
GLUCOSE SERPL-MCNC: 94 MG/DL (ref 70–99)
HCT VFR BLD CALC: 27.6 % (ref 42–52)
HEMOGLOBIN: 8.7 GM/DL (ref 13.5–18)
LACTATE DEHYDROGENASE: 166 IU/L (ref 120–246)
LYMPHOCYTES ABSOLUTE: 2.1 K/CU MM
LYMPHOCYTES RELATIVE PERCENT: 40.9 % (ref 24–44)
MCH RBC QN AUTO: 28.5 PG (ref 27–31)
MCHC RBC AUTO-ENTMCNC: 31.5 % (ref 32–36)
MCV RBC AUTO: 90.5 FL (ref 78–100)
MONOCYTES ABSOLUTE: 0.5 K/CU MM
MONOCYTES RELATIVE PERCENT: 10.7 % (ref 0–4)
PDW BLD-RTO: 13.8 % (ref 11.7–14.9)
PLATELET # BLD: 132 K/CU MM (ref 140–440)
PMV BLD AUTO: 11.9 FL (ref 7.5–11.1)
POTASSIUM SERPL-SCNC: 4.3 MMOL/L (ref 3.5–5.1)
RBC # BLD: 3.05 M/CU MM (ref 4.6–6.2)
SEGMENTED NEUTROPHILS ABSOLUTE COUNT: 2.3 K/CU MM
SEGMENTED NEUTROPHILS RELATIVE PERCENT: 45.4 % (ref 36–66)
SODIUM BLD-SCNC: 139 MMOL/L (ref 135–145)
TOTAL PROTEIN: 7.4 GM/DL (ref 6.4–8.2)
WBC # BLD: 5.1 K/CU MM (ref 4–10.5)

## 2023-04-06 PROCEDURE — 86320 SERUM IMMUNOELECTROPHORESIS: CPT

## 2023-04-06 PROCEDURE — 1123F ACP DISCUSS/DSCN MKR DOCD: CPT | Performed by: INTERNAL MEDICINE

## 2023-04-06 PROCEDURE — 85025 COMPLETE CBC W/AUTO DIFF WBC: CPT

## 2023-04-06 PROCEDURE — 84165 PROTEIN E-PHORESIS SERUM: CPT

## 2023-04-06 PROCEDURE — 99213 OFFICE O/P EST LOW 20 MIN: CPT | Performed by: INTERNAL MEDICINE

## 2023-04-06 PROCEDURE — 99211 OFF/OP EST MAY X REQ PHY/QHP: CPT

## 2023-04-06 PROCEDURE — 36415 COLL VENOUS BLD VENIPUNCTURE: CPT

## 2023-04-06 PROCEDURE — 83883 ASSAY NEPHELOMETRY NOT SPEC: CPT

## 2023-04-06 PROCEDURE — 83615 LACTATE (LD) (LDH) ENZYME: CPT

## 2023-04-06 PROCEDURE — 84155 ASSAY OF PROTEIN SERUM: CPT

## 2023-04-06 PROCEDURE — 82232 ASSAY OF BETA-2 PROTEIN: CPT

## 2023-04-06 PROCEDURE — 80053 COMPREHEN METABOLIC PANEL: CPT

## 2023-04-06 NOTE — PROGRESS NOTES
MA Rooming Questions  Patient: Melvi Matos  MRN: [id number]    Date: 4/6/2023        1. Do you have any new issues? yes - recently dx with prostate ca; will be starting injections per pt's daughter      Bx done in March       2. Do you need any refills on medications?    no    3. Have you had any imaging done since your last visit?   no    4. Have you been hospitalized or seen in the emergency room since your last visit here?   no    5. Did the patient have a depression screening completed today?  No    No data recorded     PHQ-9 Given to (if applicable):               PHQ-9 Score (if applicable):                     [] Positive     []  Negative              Does question #9 need addressed (if applicable)                     [] Yes    []  No               Treva Rodriguez MA
He is on low dose synthroid. 1/19/2023 WBC 5.7, Hg 8.5, MCV 27.3, plat 97. Creat 2.8. LFTs wnl. , SPEP 400 mg IgG lambda. [address] 114,67, 115 [facility] 120.88. K/L ratio 0.95.    2. He has chronic kidney disease. 12/2022 Creat 2.9.    3. He has monoclonal gammopathy of unknown significance. 1/19/2023 WBC 5.7, Hg 8.5, MCV 27.3, plat 97. Creat 2.8. LFTs wnl. , SPEP 400 mg IgG lambda. [address] 114,67, 115 [facility] 120.88. K/L ratio 0.95. I will continue to monitor SPEP/VERNELL, serum light chain study. He is agreeable to have labs today and I advise to call for result. He started injection to right eye for macular degeneration. 4.  He has lung nodule. CT chest in August 2022 was reviewed. I reviewed CT chest report with him. 2/2/2203 CT chest:  Stable exam of the chest.  Unchanged 7 mm nodule in the lingula since the earliest available exam in 2021. Recommend noncontrast CT follow-up in 2 years. 5.  He has seen cardiologist and complains of numbness and tingling to both fee since 8/2022. He is agreeable to see neurologist.    6. 3/3/2023 he will have prostate biopsy. He was diagnosed with likely low risk prostate cancer and started on ADT by urologist. Reportedly initial PSA ~ 10. Return to clinic in 12 weeks. All of his questions has been answered for today. I have discussed the above stated plan with the patient and they verbalized understanding and agreed with the plan.

## 2023-04-07 LAB
ALBUMIN SERPL ELPH-MCNC: 3.4 GM/DL (ref 3.2–5.6)
ALPHA-1-GLOBULIN: 0.3 GM/DL (ref 0.1–0.4)
ALPHA-2-GLOBULIN: 0.7 GM/DL (ref 0.4–1.2)
BETA GLOBULIN: 1.2 GM/DL (ref 0.5–1.3)
GAMMA GLOBULIN: 1.8 GM/DL (ref 0.5–1.6)
SPEP INTERPRETATION: ABNORMAL
SPEP INTERPRETATION: NORMAL
TOTAL PROTEIN: 7.4 GM/DL (ref 6.4–8.2)

## 2023-04-08 LAB
B2 MICROGLOB SERPL-MCNC: 7.5 MG/L
KAPPA LC FREE SER-MCNC: 106.19 MG/L (ref 3.3–19.4)
KAPPA LC FREE/LAMBDA FREE SER NEPH: 0.91 {RATIO} (ref 0.26–1.65)
LAMBDA LC FREE SERPL-MCNC: 116.21 MG/L (ref 5.71–26.3)

## 2023-06-22 ENCOUNTER — HOSPITAL ENCOUNTER (OUTPATIENT)
Age: 88
Discharge: HOME | End: 2023-06-22
Payer: MEDICARE

## 2023-06-22 DIAGNOSIS — N18.9 ACUTE KIDNEY INJURY SUPERIMPOSED ON CKD (HCC): ICD-10-CM

## 2023-06-22 DIAGNOSIS — I10 PRIMARY HYPERTENSION: ICD-10-CM

## 2023-06-22 DIAGNOSIS — N17.9 ACUTE KIDNEY INJURY SUPERIMPOSED ON CKD (HCC): ICD-10-CM

## 2023-06-22 DIAGNOSIS — N18.4 CHRONIC KIDNEY DISEASE, STAGE IV (SEVERE) (HCC): ICD-10-CM

## 2023-06-22 DIAGNOSIS — R80.1 PERSISTENT PROTEINURIA: ICD-10-CM

## 2023-06-22 LAB
ANION GAP SERPL CALCULATED.3IONS-SCNC: 13 MMOL/L (ref 4–16)
BUN SERPL-MCNC: 49 MG/DL (ref 6–23)
CALCIUM SERPL-MCNC: 9.2 MG/DL (ref 8.3–10.6)
CHLORIDE BLD-SCNC: 106 MMOL/L (ref 99–110)
CO2: 22 MMOL/L (ref 21–32)
CREAT SERPL-MCNC: 3 MG/DL (ref 0.9–1.3)
GFR SERPL CREATININE-BSD FRML MDRD: 20 ML/MIN/1.73M2
GLUCOSE SERPL-MCNC: 98 MG/DL (ref 70–99)
POTASSIUM SERPL-SCNC: 4.3 MMOL/L (ref 3.5–5.1)
SODIUM BLD-SCNC: 141 MMOL/L (ref 135–145)

## 2023-06-22 PROCEDURE — 36415 COLL VENOUS BLD VENIPUNCTURE: CPT

## 2023-06-22 PROCEDURE — 80048 BASIC METABOLIC PNL TOTAL CA: CPT

## 2023-07-06 ENCOUNTER — HOSPITAL ENCOUNTER (OUTPATIENT)
Dept: INFUSION THERAPY | Age: 88
Discharge: HOME | End: 2023-07-06
Payer: MEDICARE

## 2023-07-06 DIAGNOSIS — D64.9 ANEMIA, UNSPECIFIED TYPE: ICD-10-CM

## 2023-07-06 LAB
ALBUMIN SERPL-MCNC: 3.7 GM/DL (ref 3.4–5)
ALP BLD-CCNC: 82 IU/L (ref 40–129)
ALT SERPL-CCNC: 6 U/L (ref 10–40)
ANION GAP SERPL CALCULATED.3IONS-SCNC: 13 MMOL/L (ref 4–16)
AST SERPL-CCNC: 12 IU/L (ref 15–37)
BASOPHILS ABSOLUTE: 0 K/CU MM
BASOPHILS RELATIVE PERCENT: 0.4 % (ref 0–1)
BILIRUB SERPL-MCNC: 0.3 MG/DL (ref 0–1)
BUN SERPL-MCNC: 43 MG/DL (ref 6–23)
CALCIUM SERPL-MCNC: 9 MG/DL (ref 8.3–10.6)
CHLORIDE BLD-SCNC: 105 MMOL/L (ref 99–110)
CO2: 21 MMOL/L (ref 21–32)
CREAT SERPL-MCNC: 3.1 MG/DL (ref 0.9–1.3)
DIFFERENTIAL TYPE: ABNORMAL
EOSINOPHILS ABSOLUTE: 0.1 K/CU MM
EOSINOPHILS RELATIVE PERCENT: 2.5 % (ref 0–3)
FERRITIN: 212 NG/ML (ref 30–400)
FOLATE SERPL-MCNC: 13.2 NG/ML (ref 3.1–17.5)
GFR SERPL CREATININE-BSD FRML MDRD: 19 ML/MIN/1.73M2
GLUCOSE SERPL-MCNC: 97 MG/DL (ref 70–99)
HAV IGM SERPL QL IA: NON REACTIVE
HBV CORE IGM SERPL QL IA: NON REACTIVE
HBV SURFACE AG SERPL QL IA: NON REACTIVE
HCT VFR BLD CALC: 25.3 % (ref 42–52)
HCV AB SERPL QL IA: NON REACTIVE
HEMOGLOBIN: 8 GM/DL (ref 13.5–18)
IRON: 48 UG/DL (ref 59–158)
LACTATE DEHYDROGENASE: 148 IU/L (ref 120–246)
LYMPHOCYTES ABSOLUTE: 2.1 K/CU MM
LYMPHOCYTES RELATIVE PERCENT: 44.4 % (ref 24–44)
MCH RBC QN AUTO: 28.9 PG (ref 27–31)
MCHC RBC AUTO-ENTMCNC: 31.6 % (ref 32–36)
MCV RBC AUTO: 91.3 FL (ref 78–100)
MONOCYTES ABSOLUTE: 0.5 K/CU MM
MONOCYTES RELATIVE PERCENT: 10.6 % (ref 0–4)
PCT TRANSFERRIN: 22 % (ref 10–44)
PDW BLD-RTO: 14 % (ref 11.7–14.9)
PLATELET # BLD: 155 K/CU MM (ref 140–440)
PMV BLD AUTO: 10.8 FL (ref 7.5–11.1)
POTASSIUM SERPL-SCNC: 4.5 MMOL/L (ref 3.5–5.1)
RBC # BLD: 2.77 M/CU MM (ref 4.6–6.2)
SEGMENTED NEUTROPHILS ABSOLUTE COUNT: 2 K/CU MM
SEGMENTED NEUTROPHILS RELATIVE PERCENT: 42.1 % (ref 36–66)
SODIUM BLD-SCNC: 139 MMOL/L (ref 135–145)
TOTAL IRON BINDING CAPACITY: 219 UG/DL (ref 250–450)
TOTAL PROTEIN: 7.2 GM/DL (ref 6.4–8.2)
TOTAL PROTEIN: 7.2 GM/DL (ref 6.4–8.2)
UNSATURATED IRON BINDING CAPACITY: 171 UG/DL (ref 110–370)
VITAMIN B-12: >2000 PG/ML (ref 211–911)
WBC # BLD: 4.7 K/CU MM (ref 4–10.5)

## 2023-07-06 PROCEDURE — 82607 VITAMIN B-12: CPT

## 2023-07-06 PROCEDURE — 86320 SERUM IMMUNOELECTROPHORESIS: CPT

## 2023-07-06 PROCEDURE — 80053 COMPREHEN METABOLIC PANEL: CPT

## 2023-07-06 PROCEDURE — 83540 ASSAY OF IRON: CPT

## 2023-07-06 PROCEDURE — 83615 LACTATE (LD) (LDH) ENZYME: CPT

## 2023-07-06 PROCEDURE — 82746 ASSAY OF FOLIC ACID SERUM: CPT

## 2023-07-06 PROCEDURE — 85025 COMPLETE CBC W/AUTO DIFF WBC: CPT

## 2023-07-06 PROCEDURE — 83883 ASSAY NEPHELOMETRY NOT SPEC: CPT

## 2023-07-06 PROCEDURE — 84155 ASSAY OF PROTEIN SERUM: CPT

## 2023-07-06 PROCEDURE — 82232 ASSAY OF BETA-2 PROTEIN: CPT

## 2023-07-06 PROCEDURE — 82728 ASSAY OF FERRITIN: CPT

## 2023-07-06 PROCEDURE — 36415 COLL VENOUS BLD VENIPUNCTURE: CPT

## 2023-07-06 PROCEDURE — 80074 ACUTE HEPATITIS PANEL: CPT

## 2023-07-06 PROCEDURE — 84165 PROTEIN E-PHORESIS SERUM: CPT

## 2023-07-06 PROCEDURE — 83550 IRON BINDING TEST: CPT

## 2023-07-08 LAB
B2 MICROGLOB SERPL-MCNC: 10.4 MG/L
KAPPA LC FREE SER-MCNC: 108.63 MG/L (ref 3.3–19.4)
KAPPA LC FREE/LAMBDA FREE SER NEPH: 0.85 {RATIO} (ref 0.26–1.65)
LAMBDA LC FREE SERPL-MCNC: 128.02 MG/L (ref 5.71–26.3)

## 2023-07-11 LAB
ALBUMIN SERPL ELPH-MCNC: 3.2 GM/DL (ref 3.2–5.6)
ALPHA-1-GLOBULIN: 0.3 GM/DL (ref 0.1–0.4)
ALPHA-2-GLOBULIN: 0.8 GM/DL (ref 0.4–1.2)
BETA GLOBULIN: 1.1 GM/DL (ref 0.5–1.3)
GAMMA GLOBULIN: 1.8 GM/DL (ref 0.5–1.6)
SPEP INTERPRETATION: ABNORMAL
SPEP INTERPRETATION: NORMAL
TOTAL PROTEIN: 7.2 GM/DL (ref 6.4–8.2)

## 2023-07-13 ENCOUNTER — OFFICE VISIT (OUTPATIENT)
Dept: ONCOLOGY | Age: 88
End: 2023-07-13
Payer: MEDICARE

## 2023-07-13 ENCOUNTER — HOSPITAL ENCOUNTER (OUTPATIENT)
Dept: INFUSION THERAPY | Age: 88
Discharge: HOME | End: 2023-07-13
Payer: MEDICARE

## 2023-07-13 VITALS
WEIGHT: 157.2 LBS | OXYGEN SATURATION: 96 % | RESPIRATION RATE: 14 BRPM | TEMPERATURE: 97.9 F | DIASTOLIC BLOOD PRESSURE: 67 MMHG | HEART RATE: 60 BPM | SYSTOLIC BLOOD PRESSURE: 150 MMHG | HEIGHT: 71 IN | BODY MASS INDEX: 22.01 KG/M2

## 2023-07-13 DIAGNOSIS — D47.2 MGUS (MONOCLONAL GAMMOPATHY OF UNKNOWN SIGNIFICANCE): Primary | ICD-10-CM

## 2023-07-13 PROCEDURE — 99213 OFFICE O/P EST LOW 20 MIN: CPT | Performed by: INTERNAL MEDICINE

## 2023-07-13 PROCEDURE — 1123F ACP DISCUSS/DSCN MKR DOCD: CPT | Performed by: INTERNAL MEDICINE

## 2023-07-13 PROCEDURE — 99211 OFF/OP EST MAY X REQ PHY/QHP: CPT

## 2023-07-13 NOTE — PROGRESS NOTES
MA Rooming Questions  Patient: Brian Mcmillan  MRN: [id number]    Date: 7/13/2023        1. Do you have any new issues?   no         2. Do you need any refills on medications?    no    3. Have you had any imaging done since your last visit? yes [facility] ER xray of L hip    4. Have you been hospitalized or seen in the emergency room since your last visit here?   yes -     5. Did the patient have a depression screening completed today?  No    No data recorded     PHQ-9 Given to (if applicable):               PHQ-9 Score (if applicable):                     [] Positive     []  Negative              Does question #9 need addressed (if applicable)                     [] Yes    []  No               Jumana Hyatt MA

## 2023-07-14 ENCOUNTER — HOSPITAL ENCOUNTER (OUTPATIENT)
Age: 88
Discharge: HOME | End: 2023-07-14
Payer: MEDICARE

## 2023-07-14 LAB
T4 FREE SERPL-MCNC: 1.07 NG/DL (ref 0.9–1.8)
TSH SERPL DL<=0.005 MIU/L-ACNC: 0.21 UIU/ML (ref 0.27–4.2)

## 2023-07-14 PROCEDURE — 36415 COLL VENOUS BLD VENIPUNCTURE: CPT

## 2023-07-14 PROCEDURE — 84439 ASSAY OF FREE THYROXINE: CPT

## 2023-07-14 PROCEDURE — 84443 ASSAY THYROID STIM HORMONE: CPT

## 2023-08-29 NOTE — PROGRESS NOTES
Occupational Therapy    [facility] ACUTE CARE OCCUPATIONAL THERAPY EVALUATION  Sam Rivas, 1935, 1112/1112-A, 8/17/2022    History  [place]:  There were no encounter diagnoses. Patient  has a past medical history of Arthritis, CAD (coronary artery disease), GERD (gastroesophageal reflux disease), Hyperlipidemia, Hypertension, Kidney cysts, Thyroid disease, and Unspecified cerebral artery occlusion with cerebral infarction. Patient  has a past surgical history that includes fracture surgery; Prostate biopsy; Coronary angioplasty with stent; Colonoscopy (8/12/13); Endoscopy, colon, diagnostic (8/12/13); Upper gastrointestinal endoscopy (N/A, 2/6/2020); Colonoscopy (N/A, 2/8/2020); and Cataract removal (2021). Subjective:  Patient states:  \"I was way worse yesterday\". Pain:  No.    Communication with other providers:  Handoff to RN, co-eval with PT Angie Goo  Restrictions: General Precautions, Fall Risk    Home Setup/Prior level of function  Social/Functional History  Lives With: Alone  Type of Home: House  Home Layout: Two level, Bed/Bath upstairs, 1/2 bath on main level  Home Access: Stairs to enter with rails (uses back entry- 4-5 steps)  Entrance Stairs - Number of Steps: 4-5  Entrance Stairs - Rails: Left  Bathroom Shower/Tub: Shower chair with back, Tub/Shower unit  Home Equipment: [employer], Rollator, Walker, rolling  Has the patient had two or more falls in the past year or any fall with injury in the past year?: Yes  ADL Assistance: Independent  Homemaking Assistance: Independent  Active : Yes    Examination of body systems (includes body structures/functions, activity/participation limitations):  Observation:  Supine in bed upon arrival, agreeable to therapy, family in room  Vision:  Glasses  Hearing:  Responsys [place]  Cardiopulmonary:  No 02 needs. BP supine in bed: 179/83 (110); sitting EOB: 191/90 (107); in stand: 148/75 (95)      Body Systems and functions:  ROM R/L:  WFL.     Strength R/L: 5/5,   Sensation: Numbness in bilateral fingers  Tone: Normal  Coordination: Tremors at all times, decreased fine motor coordination, items slipping from pt's fingers  Perception: WNL    Activities of Daily Living (ADLs):  Feeding: Setup  Grooming: Setup/CGA   UB bathing: Supervision  LB bathing: CGA  UB dressing: Supervision  LB dressing: CGA  Toileting: CGA    Cognitive and Psychosocial Functioning:  Overall cognitive status: WNL  Affect: Normal        Mobility:  Supine to sit:  SBA  Transfers: CGA from EOB up to RW  Sitting balance:  SBA. Standing balance:  CGA w/ RW. Functional Mobility CGA w/ RW w/ chair follow and 2 LOB w/ [place] to correct (See PT notes for gait details)  Toilet/Shower Transfers: DNT  Sit to supine: SBA               AM-PAC Daily Activity Inpatient   How much help for putting on and taking off regular lower body clothing?: A Little  How much help for Bathing?: A Little  How much help for Toileting?: A Little  How much help for putting on and taking off regular upper body clothing?: None  How much help for taking care of personal grooming?: A Little  How much help for eating meals?: A Little  AM-[facility] Inpatient Daily Activity Raw Score: 19  AM-PAC Inpatient ADL T-Scale Score : 40.22  ADL Inpatient CMS 0-100% Score: 42.8  ADL Inpatient CMS G-Code Modifier : CK    Treatment:  Therapeutic Activity Training:   Therapeutic activity training was instructed today. Cues were given for safety, sequence, UE/LE placement, awareness, and balance. Activities performed today included bed mobility training, sup-sit, sit-stand, functional mobility, stand to sit, sit to supine    Safety: patient left in chair with chair alarm, call light within reach, RN notified, gait belt used. Assessment:  Pt is a 82 yo male admitted from home for acute anemia. Pt at baseline is Independent for ADLs Independent for high level IADLs and Independent for functional transfers/mobility.  Pt currently presents w/ deficits Complex Repair Preamble Text (Leave Blank If You Do Not Want): Extensive wide undermining was performed.

## 2023-10-13 ENCOUNTER — HOSPITAL ENCOUNTER (OUTPATIENT)
Age: 88
Discharge: HOME | End: 2023-10-13
Payer: MEDICARE

## 2023-10-13 LAB
T4 FREE SERPL-MCNC: 1.07 NG/DL (ref 0.9–1.8)
TSH SERPL DL<=0.005 MIU/L-ACNC: 0.36 UIU/ML (ref 0.27–4.2)

## 2023-10-13 PROCEDURE — 84270 ASSAY OF SEX HORMONE GLOBUL: CPT

## 2023-10-13 PROCEDURE — 84403 ASSAY OF TOTAL TESTOSTERONE: CPT

## 2023-10-13 PROCEDURE — 84443 ASSAY THYROID STIM HORMONE: CPT

## 2023-10-13 PROCEDURE — 36415 COLL VENOUS BLD VENIPUNCTURE: CPT

## 2023-10-13 PROCEDURE — 84439 ASSAY OF FREE THYROXINE: CPT

## 2023-10-16 LAB
SHBG SERPL-SCNC: 65 NMOL/L (ref 19–76)
SHBG SERPL-SCNC: 69 NMOL/L (ref 19–76)
TESTOST FREE MFR SERPL: 1.1 % (ref 1.6–2.9)
TESTOST FREE MFR SERPL: 1.2 % (ref 1.6–2.9)
TESTOST FREE SERPL-MCNC: 45 PG/ML (ref 47–244)
TESTOST FREE SERPL-MCNC: 48 PG/ML (ref 47–244)
TESTOST SERPL-MCNC: 398 NG/DL (ref 300–720)
TESTOST SERPL-MCNC: 400 NG/DL (ref 300–720)

## 2023-10-27 ENCOUNTER — HOSPITAL ENCOUNTER (OUTPATIENT)
Age: 88
Discharge: HOME | End: 2023-10-27
Payer: MEDICARE

## 2023-10-27 DIAGNOSIS — R80.1 PERSISTENT PROTEINURIA: ICD-10-CM

## 2023-10-27 DIAGNOSIS — N18.9 ACUTE KIDNEY INJURY SUPERIMPOSED ON CKD (HCC): ICD-10-CM

## 2023-10-27 DIAGNOSIS — N18.32 STAGE 3B CHRONIC KIDNEY DISEASE (HCC): ICD-10-CM

## 2023-10-27 DIAGNOSIS — N17.9 ACUTE KIDNEY INJURY SUPERIMPOSED ON CKD (HCC): ICD-10-CM

## 2023-10-27 LAB
ALBUMIN SERPL-MCNC: 3.4 GM/DL (ref 3.4–5)
ANION GAP SERPL CALCULATED.3IONS-SCNC: 12 MMOL/L (ref 4–16)
BUN SERPL-MCNC: 48 MG/DL (ref 6–23)
CALCIUM SERPL-MCNC: 8.9 MG/DL (ref 8.3–10.6)
CHLORIDE BLD-SCNC: 105 MMOL/L (ref 99–110)
CO2: 23 MMOL/L (ref 21–32)
CREAT SERPL-MCNC: 3.3 MG/DL (ref 0.9–1.3)
GFR SERPL CREATININE-BSD FRML MDRD: 17 ML/MIN/1.73M2
GLUCOSE SERPL-MCNC: 104 MG/DL (ref 70–99)
PHOSPHORUS: 2.7 MG/DL (ref 2.5–4.9)
POTASSIUM SERPL-SCNC: 4 MMOL/L (ref 3.5–5.1)
SODIUM BLD-SCNC: 140 MMOL/L (ref 135–145)

## 2023-10-27 PROCEDURE — 83970 ASSAY OF PARATHORMONE: CPT

## 2023-10-27 PROCEDURE — 80069 RENAL FUNCTION PANEL: CPT

## 2023-10-27 PROCEDURE — 36415 COLL VENOUS BLD VENIPUNCTURE: CPT

## 2023-10-30 LAB — PTH-INTACT SERPL-MCNC: 37 PG/ML (ref 15–65)

## 2023-11-07 NOTE — CARE COORDINATION
Note in Epic from ARU with denial to ARU. Spoke with patient to let him know and he voiced understanding. CM inquired on back up plan. Patient stated that he is not going to a SNF and added that he probably will be going to stay with his daughter on discharge. CM inquired on if patient would like some HHC and he stated that he would like to discuss with his daughter first before making a decision.  Case Management to follow for possible HHC Initial (On Arrival)

## 2023-12-01 ENCOUNTER — HOSPITAL ENCOUNTER (OUTPATIENT)
Age: 88
Discharge: HOME | End: 2023-12-01
Attending: SPECIALIST
Payer: MEDICARE

## 2023-12-01 ENCOUNTER — HOSPITAL ENCOUNTER (OUTPATIENT)
Dept: PET IMAGING | Age: 88
Discharge: HOME | End: 2023-12-01
Attending: SPECIALIST
Payer: MEDICARE

## 2023-12-01 DIAGNOSIS — C61 MALIGNANT NEOPLASM OF PROSTATE (HCC): ICD-10-CM

## 2023-12-01 LAB
25(OH)D3 SERPL-MCNC: 77.25 NG/ML
ANION GAP SERPL CALCULATED.3IONS-SCNC: 13 MMOL/L (ref 4–16)
BUN SERPL-MCNC: 35 MG/DL (ref 6–23)
CALCIUM SERPL-MCNC: 9 MG/DL (ref 8.3–10.6)
CHLORIDE BLD-SCNC: 105 MMOL/L (ref 99–110)
CO2: 24 MMOL/L (ref 21–32)
CREAT SERPL-MCNC: 3 MG/DL (ref 0.9–1.3)
GFR SERPL CREATININE-BSD FRML MDRD: 19 ML/MIN/1.73M2
GLUCOSE SERPL-MCNC: 96 MG/DL (ref 70–99)
POTASSIUM SERPL-SCNC: 4.2 MMOL/L (ref 3.5–5.1)
SODIUM BLD-SCNC: 142 MMOL/L (ref 135–145)

## 2023-12-01 PROCEDURE — 84154 ASSAY OF PSA FREE: CPT

## 2023-12-01 PROCEDURE — 84403 ASSAY OF TOTAL TESTOSTERONE: CPT

## 2023-12-01 PROCEDURE — 84270 ASSAY OF SEX HORMONE GLOBUL: CPT

## 2023-12-01 PROCEDURE — 3430000000 HC RX DIAGNOSTIC RADIOPHARMACEUTICAL: Performed by: SPECIALIST

## 2023-12-01 PROCEDURE — A9595 HC RX DIAGNOSTIC RADIOPHARMACEUTICAL: HCPCS | Performed by: SPECIALIST

## 2023-12-01 PROCEDURE — 2580000003 HC RX 258: Performed by: SPECIALIST

## 2023-12-01 PROCEDURE — 36415 COLL VENOUS BLD VENIPUNCTURE: CPT

## 2023-12-01 PROCEDURE — 82306 VITAMIN D 25 HYDROXY: CPT

## 2023-12-01 PROCEDURE — 78815 PET IMAGE W/CT SKULL-THIGH: CPT

## 2023-12-01 PROCEDURE — 80048 BASIC METABOLIC PNL TOTAL CA: CPT

## 2023-12-01 PROCEDURE — 84153 ASSAY OF PSA TOTAL: CPT

## 2023-12-01 RX ORDER — SODIUM CHLORIDE 0.9 % (FLUSH) 0.9 %
10 SYRINGE (ML) INJECTION PRN
Status: COMPLETED | OUTPATIENT
Start: 2023-12-01 | End: 2023-12-01

## 2023-12-01 RX ADMIN — SODIUM CHLORIDE, PRESERVATIVE FREE 10 ML: 5 INJECTION INTRAVENOUS at 13:08

## 2023-12-01 RX ADMIN — PIFLUFOLASTAT F-18 8.89 MILLICURIE: 80 INJECTION INTRAVENOUS at 13:08

## 2023-12-05 LAB
PSA FREE MFR SERPL: 8 %
PSA FREE SERPL-MCNC: 1.2 NG/ML
PSA SERPL IA-MCNC: 15.3 NG/ML (ref 0–4)
SHBG SERPL-SCNC: 61 NMOL/L (ref 19–76)
TESTOST FREE MFR SERPL: 1.2 % (ref 1.6–2.9)
TESTOST FREE SERPL-MCNC: 49 PG/ML (ref 47–244)
TESTOST SERPL-MCNC: 389 NG/DL (ref 300–720)

## 2024-01-11 ENCOUNTER — HOSPITAL ENCOUNTER (OUTPATIENT)
Age: 89
Discharge: HOME | End: 2024-01-11
Payer: MEDICARE

## 2024-01-11 LAB
T4 FREE SERPL-MCNC: 0.97 NG/DL (ref 0.9–1.8)
TSH SERPL DL<=0.005 MIU/L-ACNC: 0.27 UIU/ML (ref 0.27–4.2)

## 2024-01-11 PROCEDURE — 84439 ASSAY OF FREE THYROXINE: CPT

## 2024-01-11 PROCEDURE — 36415 COLL VENOUS BLD VENIPUNCTURE: CPT

## 2024-01-11 PROCEDURE — 84270 ASSAY OF SEX HORMONE GLOBUL: CPT

## 2024-01-11 PROCEDURE — 84153 ASSAY OF PSA TOTAL: CPT

## 2024-01-11 PROCEDURE — 84443 ASSAY THYROID STIM HORMONE: CPT

## 2024-01-11 PROCEDURE — 84154 ASSAY OF PSA FREE: CPT

## 2024-01-11 PROCEDURE — 84403 ASSAY OF TOTAL TESTOSTERONE: CPT

## 2024-01-14 LAB
PSA FREE MFR SERPL: 7 %
PSA FREE SERPL-MCNC: 0.1 NG/ML
PSA SERPL IA-MCNC: 1.4 NG/ML (ref 0–4)
SHBG SERPL-SCNC: 70 NMOL/L (ref 19–76)
TESTOST FREE MFR SERPL: <1 % (ref 1.6–2.9)
TESTOST FREE SERPL-MCNC: <1 PG/ML (ref 47–244)
TESTOST SERPL-MCNC: <3 NG/DL (ref 300–720)

## 2024-01-15 ENCOUNTER — OFFICE VISIT (OUTPATIENT)
Dept: ONCOLOGY | Age: 89
End: 2024-01-15
Payer: MEDICARE

## 2024-01-15 ENCOUNTER — HOSPITAL ENCOUNTER (OUTPATIENT)
Dept: INFUSION THERAPY | Age: 89
Discharge: HOME | End: 2024-01-15
Payer: MEDICARE

## 2024-01-15 VITALS
SYSTOLIC BLOOD PRESSURE: 126 MMHG | RESPIRATION RATE: 16 BRPM | OXYGEN SATURATION: 97 % | BODY MASS INDEX: 22.26 KG/M2 | DIASTOLIC BLOOD PRESSURE: 60 MMHG | HEIGHT: 71 IN | HEART RATE: 70 BPM | TEMPERATURE: 97.6 F | WEIGHT: 159 LBS

## 2024-01-15 DIAGNOSIS — D47.2 MGUS (MONOCLONAL GAMMOPATHY OF UNKNOWN SIGNIFICANCE): ICD-10-CM

## 2024-01-15 DIAGNOSIS — D47.2 MGUS (MONOCLONAL GAMMOPATHY OF UNKNOWN SIGNIFICANCE): Primary | ICD-10-CM

## 2024-01-15 DIAGNOSIS — D64.9 ANEMIA, UNSPECIFIED TYPE: ICD-10-CM

## 2024-01-15 LAB
ALBUMIN SERPL-MCNC: 3.7 GM/DL (ref 3.4–5)
ALP BLD-CCNC: 94 IU/L (ref 40–129)
ALT SERPL-CCNC: 10 U/L (ref 10–40)
ANION GAP SERPL CALCULATED.3IONS-SCNC: 12 MMOL/L (ref 7–16)
AST SERPL-CCNC: 14 IU/L (ref 15–37)
BASOPHILS ABSOLUTE: 0 K/CU MM
BASOPHILS RELATIVE PERCENT: 0.3 % (ref 0–1)
BILIRUB SERPL-MCNC: 0.3 MG/DL (ref 0–1)
BUN SERPL-MCNC: 52 MG/DL (ref 6–23)
CALCIUM SERPL-MCNC: 9.1 MG/DL (ref 8.3–10.6)
CHLORIDE BLD-SCNC: 105 MMOL/L (ref 99–110)
CO2: 24 MMOL/L (ref 21–32)
CREAT SERPL-MCNC: 2.9 MG/DL (ref 0.9–1.3)
DIFFERENTIAL TYPE: ABNORMAL
EOSINOPHILS ABSOLUTE: 0.1 K/CU MM
EOSINOPHILS RELATIVE PERCENT: 1.9 % (ref 0–3)
ERYTHROCYTE SEDIMENTATION RATE: 97 MM/HR (ref 0–20)
GFR SERPL CREATININE-BSD FRML MDRD: 20 ML/MIN/1.73M2
GLUCOSE SERPL-MCNC: 132 MG/DL (ref 70–99)
HCT VFR BLD CALC: 26.1 % (ref 42–52)
HEMOGLOBIN: 8.1 GM/DL (ref 13.5–18)
LACTATE DEHYDROGENASE: 149 IU/L (ref 120–246)
LYMPHOCYTES ABSOLUTE: 2.8 K/CU MM
LYMPHOCYTES RELATIVE PERCENT: 37.3 % (ref 24–44)
MCH RBC QN AUTO: 28 PG (ref 27–31)
MCHC RBC AUTO-ENTMCNC: 31 % (ref 32–36)
MCV RBC AUTO: 90.3 FL (ref 78–100)
MONOCYTES ABSOLUTE: 0.8 K/CU MM
MONOCYTES RELATIVE PERCENT: 10.2 % (ref 0–4)
PDW BLD-RTO: 14.9 % (ref 11.7–14.9)
PLATELET # BLD: 135 K/CU MM (ref 140–440)
PMV BLD AUTO: 8.7 FL (ref 7.5–11.1)
POTASSIUM SERPL-SCNC: 3.7 MMOL/L (ref 3.5–5.1)
RBC # BLD: 2.89 M/CU MM (ref 4.6–6.2)
SEGMENTED NEUTROPHILS ABSOLUTE COUNT: 3.8 K/CU MM
SEGMENTED NEUTROPHILS RELATIVE PERCENT: 50.3 % (ref 36–66)
SODIUM BLD-SCNC: 141 MMOL/L (ref 135–145)
TOTAL PROTEIN: 7.9 GM/DL (ref 6.4–8.2)
WBC # BLD: 7.5 K/CU MM (ref 4–10.5)

## 2024-01-15 PROCEDURE — 83883 ASSAY NEPHELOMETRY NOT SPEC: CPT

## 2024-01-15 PROCEDURE — 80053 COMPREHEN METABOLIC PANEL: CPT

## 2024-01-15 PROCEDURE — 84155 ASSAY OF PROTEIN SERUM: CPT

## 2024-01-15 PROCEDURE — 36415 COLL VENOUS BLD VENIPUNCTURE: CPT

## 2024-01-15 PROCEDURE — G8420 CALC BMI NORM PARAMETERS: HCPCS | Performed by: INTERNAL MEDICINE

## 2024-01-15 PROCEDURE — 1036F TOBACCO NON-USER: CPT | Performed by: INTERNAL MEDICINE

## 2024-01-15 PROCEDURE — 84165 PROTEIN E-PHORESIS SERUM: CPT

## 2024-01-15 PROCEDURE — 86320 SERUM IMMUNOELECTROPHORESIS: CPT

## 2024-01-15 PROCEDURE — 82232 ASSAY OF BETA-2 PROTEIN: CPT

## 2024-01-15 PROCEDURE — 83615 LACTATE (LD) (LDH) ENZYME: CPT

## 2024-01-15 PROCEDURE — G8427 DOCREV CUR MEDS BY ELIG CLIN: HCPCS | Performed by: INTERNAL MEDICINE

## 2024-01-15 PROCEDURE — 85025 COMPLETE CBC W/AUTO DIFF WBC: CPT

## 2024-01-15 PROCEDURE — 1123F ACP DISCUSS/DSCN MKR DOCD: CPT | Performed by: INTERNAL MEDICINE

## 2024-01-15 PROCEDURE — 85652 RBC SED RATE AUTOMATED: CPT

## 2024-01-15 PROCEDURE — G8484 FLU IMMUNIZE NO ADMIN: HCPCS | Performed by: INTERNAL MEDICINE

## 2024-01-15 PROCEDURE — 99213 OFFICE O/P EST LOW 20 MIN: CPT | Performed by: INTERNAL MEDICINE

## 2024-01-15 PROCEDURE — 99211 OFF/OP EST MAY X REQ PHY/QHP: CPT

## 2024-01-15 RX ORDER — RELUGOLIX 120 MG/1
TABLET, FILM COATED ORAL
COMMUNITY

## 2024-01-15 ASSESSMENT — PATIENT HEALTH QUESTIONNAIRE - PHQ9
6. FEELING BAD ABOUT YOURSELF - OR THAT YOU ARE A FAILURE OR HAVE LET YOURSELF OR YOUR FAMILY DOWN: 1
2. FEELING DOWN, DEPRESSED OR HOPELESS: 1
SUM OF ALL RESPONSES TO PHQ QUESTIONS 1-9: 8
1. LITTLE INTEREST OR PLEASURE IN DOING THINGS: 3
SUM OF ALL RESPONSES TO PHQ9 QUESTIONS 1 & 2: 4
7. TROUBLE CONCENTRATING ON THINGS, SUCH AS READING THE NEWSPAPER OR WATCHING TELEVISION: 0
10. IF YOU CHECKED OFF ANY PROBLEMS, HOW DIFFICULT HAVE THESE PROBLEMS MADE IT FOR YOU TO DO YOUR WORK, TAKE CARE OF THINGS AT HOME, OR GET ALONG WITH OTHER PEOPLE: 1
9. THOUGHTS THAT YOU WOULD BE BETTER OFF DEAD, OR OF HURTING YOURSELF: 0
SUM OF ALL RESPONSES TO PHQ QUESTIONS 1-9: 8
4. FEELING TIRED OR HAVING LITTLE ENERGY: 3
5. POOR APPETITE OR OVEREATING: 0
3. TROUBLE FALLING OR STAYING ASLEEP: 0
8. MOVING OR SPEAKING SO SLOWLY THAT OTHER PEOPLE COULD HAVE NOTICED. OR THE OPPOSITE, BEING SO FIGETY OR RESTLESS THAT YOU HAVE BEEN MOVING AROUND A LOT MORE THAN USUAL: 0

## 2024-01-15 NOTE — PROGRESS NOTES
MA Rooming Questions  Patient: Luca Mckeon  MRN: [id number]    Date: 1/15/2024        1. Do you have any new issues?   yes - pt states he was told he has anemia         2. Do you need any refills on medications?    no    3. Have you had any imaging done since your last visit?   yes - PET CT 12/1    4. Have you been hospitalized or seen in the emergency room since your last visit here?   no    5. Did the patient have a depression screening completed today? Yes    No data recorded     PHQ-9 Given to (if applicable):               PHQ-9 Score (if applicable):                     [] Positive     []  Negative              Does question #9 need addressed (if applicable)                     [] Yes    []  No               Corrine De Luna MA      
report with him.  2/2/2203 CT chest: Stable exam of the chest.  Unchanged 7 mm nodule in the lingula since the earliest available exam in 2021.  Recommend noncontrast CT follow-up in 2 years.    5.  He has seen cardiologist and complains of numbness and tingling to both fee since 8/2022. He is agreeable to see neurologist.    6. 3/3/2023 he had prostate biopsy.  He was diagnosed with likely low risk prostate cancer. Reportedly initial PSA ~ 10.   12/1/2023 [facility] PET by Dr TALBOT:  Findings are suspicious for residual or recurrent disease at the prostate bed, in this patient with history of prostate malignancy.  1/2024 TSH wnl.  PSA 1.4.   He started on Orgovyx by Dr TALBOT.    Return to clinic in 3 months or sooner. All of his questions has been answered for today.    I have discussed the above stated plan with the patient and they verbalized understanding and agreed with the plan.

## 2024-01-18 LAB
B2 MICROGLOB SERPL-MCNC: 7.8 MG/L
KAPPA LC FREE SER-MCNC: 141.25 MG/L (ref 3.3–19.4)
KAPPA LC FREE/LAMBDA FREE SER NEPH: 1.01 {RATIO} (ref 0.26–1.65)
LAMBDA LC FREE SERPL-MCNC: 139.45 MG/L (ref 5.71–26.3)

## 2024-01-20 LAB
ALBUMIN SERPL ELPH-MCNC: 3.3 GM/DL (ref 3.2–5.6)
ALPHA-1-GLOBULIN: 0.4 GM/DL (ref 0.1–0.4)
ALPHA-2-GLOBULIN: 1 GM/DL (ref 0.4–1.2)
BETA GLOBULIN: 1.2 GM/DL (ref 0.5–1.3)
GAMMA GLOBULIN: 2.1 GM/DL (ref 0.5–1.6)
TOTAL PROTEIN: 7.9 GM/DL (ref 6.4–8.2)

## 2024-01-22 LAB
ALBUMIN SERPL ELPH-MCNC: 3.3 GM/DL (ref 3.2–5.6)
ALPHA-1-GLOBULIN: 0.4 GM/DL (ref 0.1–0.4)
ALPHA-2-GLOBULIN: 1 GM/DL (ref 0.4–1.2)
BETA GLOBULIN: 1.2 GM/DL (ref 0.5–1.3)
GAMMA GLOBULIN: 2.1 GM/DL (ref 0.5–1.6)
SPEP INTERPRETATION: ABNORMAL
SPEP INTERPRETATION: NORMAL
TOTAL PROTEIN: 7.9 GM/DL (ref 6.4–8.2)

## 2024-01-26 ENCOUNTER — HOSPITAL ENCOUNTER (OUTPATIENT)
Age: 89
Discharge: HOME | End: 2024-01-26
Payer: MEDICARE

## 2024-01-26 DIAGNOSIS — N18.4 CHRONIC KIDNEY DISEASE, STAGE IV (SEVERE) (HCC): ICD-10-CM

## 2024-01-26 DIAGNOSIS — N17.9 ACUTE KIDNEY INJURY SUPERIMPOSED ON CKD (HCC): ICD-10-CM

## 2024-01-26 DIAGNOSIS — I13.10 HYPERTENSIVE HEART AND RENAL DISEASE WITH RENAL FAILURE, STAGE 1 THROUGH STAGE 4 OR UNSPECIFIED CHRONIC KIDNEY DISEASE, WITHOUT HEART FAILURE: ICD-10-CM

## 2024-01-26 DIAGNOSIS — N18.9 ACUTE KIDNEY INJURY SUPERIMPOSED ON CKD (HCC): ICD-10-CM

## 2024-01-26 LAB
ANION GAP SERPL CALCULATED.3IONS-SCNC: 11 MMOL/L (ref 7–16)
BUN SERPL-MCNC: 49 MG/DL (ref 6–23)
CALCIUM SERPL-MCNC: 8.6 MG/DL (ref 8.3–10.6)
CHLORIDE BLD-SCNC: 107 MMOL/L (ref 99–110)
CO2: 26 MMOL/L (ref 21–32)
CREAT SERPL-MCNC: 2.7 MG/DL (ref 0.9–1.3)
GFR SERPL CREATININE-BSD FRML MDRD: 22 ML/MIN/1.73M2
GLUCOSE SERPL-MCNC: 125 MG/DL (ref 70–99)
POTASSIUM SERPL-SCNC: 4.2 MMOL/L (ref 3.5–5.1)
SODIUM BLD-SCNC: 144 MMOL/L (ref 135–145)

## 2024-01-26 PROCEDURE — 80048 BASIC METABOLIC PNL TOTAL CA: CPT

## 2024-01-26 PROCEDURE — 36415 COLL VENOUS BLD VENIPUNCTURE: CPT

## 2024-03-29 ENCOUNTER — HOSPITAL ENCOUNTER (OUTPATIENT)
Age: 89
Discharge: HOME | End: 2024-03-29
Payer: MEDICARE

## 2024-03-29 DIAGNOSIS — N18.4 CHRONIC KIDNEY DISEASE, STAGE IV (SEVERE) (HCC): ICD-10-CM

## 2024-03-29 LAB
ANION GAP SERPL CALCULATED.3IONS-SCNC: 11 MMOL/L (ref 7–16)
BUN SERPL-MCNC: 53 MG/DL (ref 6–23)
CALCIUM SERPL-MCNC: 9.5 MG/DL (ref 8.3–10.6)
CHLORIDE BLD-SCNC: 106 MMOL/L (ref 99–110)
CO2: 25 MMOL/L (ref 21–32)
CREAT SERPL-MCNC: 3 MG/DL (ref 0.9–1.3)
GFR SERPL CREATININE-BSD FRML MDRD: 19 ML/MIN/1.73M2
GLUCOSE SERPL-MCNC: 126 MG/DL (ref 70–99)
POTASSIUM SERPL-SCNC: 4.4 MMOL/L (ref 3.5–5.1)
SODIUM BLD-SCNC: 142 MMOL/L (ref 135–145)

## 2024-03-29 PROCEDURE — 80048 BASIC METABOLIC PNL TOTAL CA: CPT

## 2024-03-29 PROCEDURE — 36415 COLL VENOUS BLD VENIPUNCTURE: CPT

## 2024-04-10 ENCOUNTER — HOSPITAL ENCOUNTER (OUTPATIENT)
Age: 89
Discharge: HOME | End: 2024-04-10
Payer: MEDICARE

## 2024-04-10 LAB
25(OH)D3 SERPL-MCNC: 81.56 NG/ML
T4 FREE SERPL-MCNC: 0.93 NG/DL (ref 0.9–1.8)
TSH SERPL DL<=0.005 MIU/L-ACNC: 0.4 UIU/ML (ref 0.27–4.2)

## 2024-04-10 PROCEDURE — 36415 COLL VENOUS BLD VENIPUNCTURE: CPT

## 2024-04-10 PROCEDURE — 84439 ASSAY OF FREE THYROXINE: CPT

## 2024-04-10 PROCEDURE — 84403 ASSAY OF TOTAL TESTOSTERONE: CPT

## 2024-04-10 PROCEDURE — 84402 ASSAY OF FREE TESTOSTERONE: CPT

## 2024-04-10 PROCEDURE — 84270 ASSAY OF SEX HORMONE GLOBUL: CPT

## 2024-04-10 PROCEDURE — 84153 ASSAY OF PSA TOTAL: CPT

## 2024-04-10 PROCEDURE — 84443 ASSAY THYROID STIM HORMONE: CPT

## 2024-04-10 PROCEDURE — 84154 ASSAY OF PSA FREE: CPT

## 2024-04-10 PROCEDURE — 82306 VITAMIN D 25 HYDROXY: CPT

## 2024-04-12 LAB
PSA FREE MFR SERPL: <25 %
PSA FREE SERPL-MCNC: <0.1 NG/ML
PSA SERPL IA-MCNC: 0.4 NG/ML (ref 0–4)
SHBG SERPL-SCNC: 58 NMOL/L (ref 19–76)
TESTOST FREE MFR SERPL: <1.2 % (ref 1.6–2.9)
TESTOST FREE SERPL-MCNC: <1 PG/ML (ref 47–244)
TESTOST FREE SERPL-MCNC: <1 PG/ML (ref 47–244)
TESTOST SERPL-MCNC: <3 NG/DL (ref 300–720)

## 2024-04-18 ENCOUNTER — HOSPITAL ENCOUNTER (OUTPATIENT)
Dept: WOMENS IMAGING | Age: 89
Discharge: HOME | End: 2024-04-18
Attending: SPECIALIST
Payer: MEDICARE

## 2024-04-18 DIAGNOSIS — C61 MALIGNANT NEOPLASM OF PROSTATE (HCC): ICD-10-CM

## 2024-04-18 DIAGNOSIS — M81.0 AGE-RELATED OSTEOPOROSIS WITHOUT CURRENT PATHOLOGICAL FRACTURE: ICD-10-CM

## 2024-04-18 PROCEDURE — 77080 DXA BONE DENSITY AXIAL: CPT

## 2024-04-23 ENCOUNTER — HOSPITAL ENCOUNTER (OUTPATIENT)
Dept: INFUSION THERAPY | Age: 89
Discharge: HOME | End: 2024-04-23
Payer: MEDICARE

## 2024-04-23 DIAGNOSIS — D47.2 MGUS (MONOCLONAL GAMMOPATHY OF UNKNOWN SIGNIFICANCE): ICD-10-CM

## 2024-04-23 DIAGNOSIS — D64.9 ANEMIA, UNSPECIFIED TYPE: ICD-10-CM

## 2024-04-23 LAB
ALBUMIN SERPL-MCNC: 3.5 GM/DL (ref 3.4–5)
ALP BLD-CCNC: 91 IU/L (ref 40–129)
ALT SERPL-CCNC: 7 U/L (ref 10–40)
ANION GAP SERPL CALCULATED.3IONS-SCNC: 14 MMOL/L (ref 7–16)
AST SERPL-CCNC: 14 IU/L (ref 15–37)
BASOPHILS ABSOLUTE: 0 K/CU MM
BASOPHILS RELATIVE PERCENT: 0.2 % (ref 0–1)
BILIRUB SERPL-MCNC: 0.2 MG/DL (ref 0–1)
BUN SERPL-MCNC: 48 MG/DL (ref 6–23)
CALCIUM SERPL-MCNC: 9.1 MG/DL (ref 8.3–10.6)
CHLORIDE BLD-SCNC: 102 MMOL/L (ref 99–110)
CO2: 23 MMOL/L (ref 21–32)
CREAT SERPL-MCNC: 2.5 MG/DL (ref 0.9–1.3)
DIFFERENTIAL TYPE: ABNORMAL
EOSINOPHILS ABSOLUTE: 0.2 K/CU MM
EOSINOPHILS RELATIVE PERCENT: 3.7 % (ref 0–3)
FERRITIN: 249 NG/ML (ref 30–400)
GFR SERPL CREATININE-BSD FRML MDRD: 24 ML/MIN/1.73M2
GLUCOSE SERPL-MCNC: 119 MG/DL (ref 70–99)
HCT VFR BLD CALC: 24.6 % (ref 42–52)
HEMOGLOBIN: 7.8 GM/DL (ref 13.5–18)
IGA: 663 MG/DL (ref 69–382)
IGG,SERUM: 1987 MG/DL (ref 723–1685)
IGM,SERUM: 157 MG/DL (ref 62–277)
IRON: 49 UG/DL (ref 59–158)
LACTATE DEHYDROGENASE: 155 IU/L (ref 120–246)
LYMPHOCYTES ABSOLUTE: 2.5 K/CU MM
LYMPHOCYTES RELATIVE PERCENT: 42 % (ref 24–44)
MCH RBC QN AUTO: 28.5 PG (ref 27–31)
MCHC RBC AUTO-ENTMCNC: 31.7 % (ref 32–36)
MCV RBC AUTO: 89.8 FL (ref 78–100)
MONOCYTES ABSOLUTE: 0.7 K/CU MM
MONOCYTES RELATIVE PERCENT: 11.1 % (ref 0–4)
NEUTROPHILS RELATIVE PERCENT: 43 % (ref 36–66)
PCT TRANSFERRIN: 19 % (ref 10–44)
PDW BLD-RTO: 16.4 % (ref 11.7–14.9)
PLATELET # BLD: 131 K/CU MM (ref 140–440)
PMV BLD AUTO: 9.4 FL (ref 7.5–11.1)
POTASSIUM SERPL-SCNC: 3.8 MMOL/L (ref 3.5–5.1)
RBC # BLD: 2.74 M/CU MM (ref 4.6–6.2)
SEGMENTED NEUTROPHILS ABSOLUTE COUNT: 2.6 K/CU MM
SODIUM BLD-SCNC: 139 MMOL/L (ref 135–145)
TOTAL IRON BINDING CAPACITY: 264 UG/DL (ref 250–450)
TOTAL PROTEIN: 7.6 GM/DL (ref 6.4–8.2)
TOTAL PROTEIN: 7.6 GM/DL (ref 6.4–8.2)
UNSATURATED IRON BINDING CAPACITY: 215 UG/DL (ref 110–370)
WBC # BLD: 6 K/CU MM (ref 4–10.5)

## 2024-04-23 PROCEDURE — 86320 SERUM IMMUNOELECTROPHORESIS: CPT

## 2024-04-23 PROCEDURE — 85025 COMPLETE CBC W/AUTO DIFF WBC: CPT

## 2024-04-23 PROCEDURE — 83883 ASSAY NEPHELOMETRY NOT SPEC: CPT

## 2024-04-23 PROCEDURE — 80053 COMPREHEN METABOLIC PANEL: CPT

## 2024-04-23 PROCEDURE — 84155 ASSAY OF PROTEIN SERUM: CPT

## 2024-04-23 PROCEDURE — 82232 ASSAY OF BETA-2 PROTEIN: CPT

## 2024-04-23 PROCEDURE — 82728 ASSAY OF FERRITIN: CPT

## 2024-04-23 PROCEDURE — 83540 ASSAY OF IRON: CPT

## 2024-04-23 PROCEDURE — 83550 IRON BINDING TEST: CPT

## 2024-04-23 PROCEDURE — 83615 LACTATE (LD) (LDH) ENZYME: CPT

## 2024-04-23 PROCEDURE — 36415 COLL VENOUS BLD VENIPUNCTURE: CPT

## 2024-04-23 PROCEDURE — 84165 PROTEIN E-PHORESIS SERUM: CPT

## 2024-04-23 PROCEDURE — 82784 ASSAY IGA/IGD/IGG/IGM EACH: CPT

## 2024-04-25 LAB
B2 MICROGLOB SERPL-MCNC: 8 MG/L
KAPPA LC FREE SER-MCNC: 111.28 MG/L (ref 3.3–19.4)
KAPPA LC FREE/LAMBDA FREE SER NEPH: 0.79 {RATIO} (ref 0.26–1.65)
LAMBDA LC FREE SERPL-MCNC: 140.97 MG/L (ref 5.71–26.3)

## 2024-04-28 NOTE — PROGRESS NOTES
Patient Name:  Luca Mckeon  Patient :  1935  Patient MRN:  [id number]     Primary Oncologist: Kim Harvey MD  Referring Provider: Emanuel Aceves MD     Date of Service: 2024       Chief Complaint:    Chief Complaint   Patient presents with    Follow-up     He came in for follow up visit.     Patient Active Problem List:     Stage 3b chronic kidney disease      Persistent proteinuria     Hypertensive heart and renal disease     History of anemia due to CKD     HPI:   Luca Mckeon is a pleasant 88 year old male patient who was referred for evaluation of anemia. He reported that he has sickle cell trait.  2021 WBC was 5.9, hemoglobin 8.8, hematocrit 29.2, MCV 89.6, platelet 190.  Peripheral smear showed anisocytosis, polychromasia, ovalocytes.  Ferritin was 394, iron 23, TIBC 197, folate 12.9, B12 more than 2000.  TSH was 0.06.  Free T4 was normal at 1.31.  Creatinine was 2.9, total protein 7.4, albumin 3.4.  SPEP showed a faint monoclonal spike noted measuring 500 mg/dL.  VERNELL showed faint monoclonal free lambda light chain proteins.    May 26, 2021 CT abdomen compared to study in 2020, 2019, 2008 showed :  No significant interval change in bilateral renal lesions, most notably a 4.3 cm complicated cystic lesion of the upper pole of the right kidney which is at least a Bosniak 2F lesion.  This dominant lesion is decreased in size since  and is probably benign.  These renal lesions could be further evaluated with renal mass protocol MRI at patient's current renal function if clinically indicated.    2022 bone density: osteopenia. I recommend to take vitamin D and calcium.  I reviewed his records from epic.  He has been anemic at least since .  Hemoglobin in  was normal.  2020 he had episode of rectal bleed and received 3 units of packed red blood cell.  Reportedly he had upper and lower endoscopic study in the first part of  by

## 2024-05-02 LAB
ALBUMIN SERPL ELPH-MCNC: 3.4 GM/DL (ref 3.2–5.6)
ALPHA-1-GLOBULIN: 0.3 GM/DL (ref 0.1–0.4)
ALPHA-2-GLOBULIN: 0.9 GM/DL (ref 0.4–1.2)
BETA GLOBULIN: 1.1 GM/DL (ref 0.5–1.3)
GAMMA GLOBULIN: 2 GM/DL (ref 0.5–1.6)
SPEP INTERPRETATION: ABNORMAL
SPEP INTERPRETATION: NORMAL
TOTAL PROTEIN: 7.6 GM/DL (ref 6.4–8.2)

## 2024-05-09 ENCOUNTER — OFFICE VISIT (OUTPATIENT)
Dept: ONCOLOGY | Age: 89
End: 2024-05-09
Payer: MEDICARE

## 2024-05-09 ENCOUNTER — HOSPITAL ENCOUNTER (OUTPATIENT)
Dept: INFUSION THERAPY | Age: 89
Discharge: HOME | End: 2024-05-09
Payer: MEDICARE

## 2024-05-09 VITALS
RESPIRATION RATE: 16 BRPM | SYSTOLIC BLOOD PRESSURE: 125 MMHG | DIASTOLIC BLOOD PRESSURE: 59 MMHG | OXYGEN SATURATION: 95 % | HEART RATE: 62 BPM | BODY MASS INDEX: 22.76 KG/M2 | HEIGHT: 71 IN | TEMPERATURE: 97.8 F | WEIGHT: 162.6 LBS

## 2024-05-09 DIAGNOSIS — D64.9 ANEMIA, UNSPECIFIED TYPE: ICD-10-CM

## 2024-05-09 DIAGNOSIS — D47.2 MGUS (MONOCLONAL GAMMOPATHY OF UNKNOWN SIGNIFICANCE): Primary | ICD-10-CM

## 2024-05-09 DIAGNOSIS — D47.2 MGUS (MONOCLONAL GAMMOPATHY OF UNKNOWN SIGNIFICANCE): ICD-10-CM

## 2024-05-09 LAB
BASOPHILS ABSOLUTE: 0 K/CU MM
BASOPHILS RELATIVE PERCENT: 0 % (ref 0–1)
DIFFERENTIAL TYPE: ABNORMAL
EOSINOPHILS ABSOLUTE: 0.2 K/CU MM
EOSINOPHILS RELATIVE PERCENT: 3.6 % (ref 0–3)
HCT VFR BLD CALC: 25.7 % (ref 42–52)
HEMOGLOBIN: 8 GM/DL (ref 13.5–18)
LYMPHOCYTES ABSOLUTE: 2.1 K/CU MM
LYMPHOCYTES RELATIVE PERCENT: 37.3 % (ref 24–44)
MCH RBC QN AUTO: 28 PG (ref 27–31)
MCHC RBC AUTO-ENTMCNC: 31.1 % (ref 32–36)
MCV RBC AUTO: 89.9 FL (ref 78–100)
MONOCYTES ABSOLUTE: 0.6 K/CU MM
MONOCYTES RELATIVE PERCENT: 11.2 % (ref 0–4)
NEUTROPHILS ABSOLUTE: 2.6 K/CU MM
NEUTROPHILS RELATIVE PERCENT: 47.9 % (ref 36–66)
PDW BLD-RTO: 15.7 % (ref 11.7–14.9)
PLATELET # BLD: 148 K/CU MM (ref 140–440)
PMV BLD AUTO: 9.9 FL (ref 7.5–11.1)
RBC # BLD: 2.86 M/CU MM (ref 4.6–6.2)
WBC # BLD: 5.5 K/CU MM (ref 4–10.5)

## 2024-05-09 PROCEDURE — 1123F ACP DISCUSS/DSCN MKR DOCD: CPT | Performed by: INTERNAL MEDICINE

## 2024-05-09 PROCEDURE — 85025 COMPLETE CBC W/AUTO DIFF WBC: CPT

## 2024-05-09 PROCEDURE — 36415 COLL VENOUS BLD VENIPUNCTURE: CPT

## 2024-05-09 PROCEDURE — 1036F TOBACCO NON-USER: CPT | Performed by: INTERNAL MEDICINE

## 2024-05-09 PROCEDURE — G8420 CALC BMI NORM PARAMETERS: HCPCS | Performed by: INTERNAL MEDICINE

## 2024-05-09 PROCEDURE — 99211 OFF/OP EST MAY X REQ PHY/QHP: CPT

## 2024-05-09 PROCEDURE — G8428 CUR MEDS NOT DOCUMENT: HCPCS | Performed by: INTERNAL MEDICINE

## 2024-05-09 PROCEDURE — 99213 OFFICE O/P EST LOW 20 MIN: CPT | Performed by: INTERNAL MEDICINE

## 2024-05-09 NOTE — PROGRESS NOTES
MA Rooming Questions  Patient: Luca Mckeon  MRN: [id number]    Date: 5/9/2024        1. Do you have any new issues?   Yes- fatigue, night sweats- during day and night         2. Do you need any refills on medications?    no    3. Have you had any imaging done since your last visit?   yes - DEXA    4. Have you been hospitalized or seen in the emergency room since your last visit here?   no    5. Did the patient have a depression screening completed today? No    No data recorded     PHQ-9 Given to (if applicable):               PHQ-9 Score (if applicable):                     [] Positive     []  Negative              Does question #9 need addressed (if applicable)                     [] Yes    []  No               Kirstin Jacobson CMA

## 2024-06-26 ENCOUNTER — HOSPITAL ENCOUNTER (OUTPATIENT)
Age: 89
Discharge: HOME | End: 2024-06-26
Payer: MEDICARE

## 2024-06-26 DIAGNOSIS — N17.9 ACUTE KIDNEY INJURY SUPERIMPOSED ON CKD (HCC): ICD-10-CM

## 2024-06-26 DIAGNOSIS — N18.9 ACUTE KIDNEY INJURY SUPERIMPOSED ON CKD (HCC): ICD-10-CM

## 2024-06-26 DIAGNOSIS — N18.4 CHRONIC KIDNEY DISEASE, STAGE IV (SEVERE) (HCC): ICD-10-CM

## 2024-06-26 DIAGNOSIS — I10 PRIMARY HYPERTENSION: ICD-10-CM

## 2024-06-26 DIAGNOSIS — R80.1 PERSISTENT PROTEINURIA: ICD-10-CM

## 2024-06-26 LAB
ANION GAP SERPL CALCULATED.3IONS-SCNC: 13 MMOL/L (ref 7–16)
BUN SERPL-MCNC: 54 MG/DL (ref 6–23)
CALCIUM SERPL-MCNC: 9.4 MG/DL (ref 8.3–10.6)
CHLORIDE BLD-SCNC: 109 MMOL/L (ref 99–110)
CO2: 21 MMOL/L (ref 21–32)
CREAT SERPL-MCNC: 2.9 MG/DL (ref 0.9–1.3)
CREATININE URINE: 69.2 MG/DL (ref 39–259)
GFR, ESTIMATED: 20 ML/MIN/1.73M2
GLUCOSE SERPL-MCNC: 112 MG/DL (ref 70–99)
POTASSIUM SERPL-SCNC: 4 MMOL/L (ref 3.5–5.1)
PROT/CREAT RATIO, UR: 0.3
SODIUM BLD-SCNC: 143 MMOL/L (ref 135–145)
URINE TOTAL PROTEIN: 22.5 MG/DL

## 2024-06-26 PROCEDURE — 84156 ASSAY OF PROTEIN URINE: CPT

## 2024-06-26 PROCEDURE — 36415 COLL VENOUS BLD VENIPUNCTURE: CPT

## 2024-06-26 PROCEDURE — 80048 BASIC METABOLIC PNL TOTAL CA: CPT

## 2024-06-26 PROCEDURE — 82570 ASSAY OF URINE CREATININE: CPT

## 2024-07-11 ENCOUNTER — HOSPITAL ENCOUNTER (OUTPATIENT)
Age: 89
Discharge: HOME | End: 2024-07-11
Payer: MEDICARE

## 2024-07-11 LAB
25(OH)D3 SERPL-MCNC: 73.16 NG/ML
ALBUMIN SERPL-MCNC: 3.7 GM/DL (ref 3.4–5)
ALP BLD-CCNC: 85 IU/L (ref 40–128)
ALT SERPL-CCNC: 8 U/L (ref 10–40)
ANION GAP SERPL CALCULATED.3IONS-SCNC: 13 MMOL/L (ref 7–16)
AST SERPL-CCNC: 15 IU/L (ref 15–37)
BILIRUB SERPL-MCNC: 0.3 MG/DL (ref 0–1)
BUN SERPL-MCNC: 45 MG/DL (ref 6–23)
CALCIUM SERPL-MCNC: 9.1 MG/DL (ref 8.3–10.6)
CHLORIDE BLD-SCNC: 107 MMOL/L (ref 99–110)
CHOLESTEROL, FASTING: 107 MG/DL
CO2: 21 MMOL/L (ref 21–32)
CREAT SERPL-MCNC: 3 MG/DL (ref 0.9–1.3)
GFR, ESTIMATED: 19 ML/MIN/1.73M2
GLUCOSE SERPL-MCNC: 105 MG/DL (ref 70–99)
HDLC SERPL-MCNC: 45 MG/DL
LDLC SERPL CALC-MCNC: 48 MG/DL
POTASSIUM SERPL-SCNC: 4.2 MMOL/L (ref 3.5–5.1)
SODIUM BLD-SCNC: 141 MMOL/L (ref 135–145)
T4 FREE SERPL-MCNC: 1.23 NG/DL (ref 0.9–1.8)
TOTAL PROTEIN: 8.2 GM/DL (ref 6.4–8.2)
TRIGLYCERIDE, FASTING: 68 MG/DL
TSH SERPL DL<=0.005 MIU/L-ACNC: 0.1 UIU/ML (ref 0.27–4.2)

## 2024-07-11 PROCEDURE — 80053 COMPREHEN METABOLIC PANEL: CPT

## 2024-07-11 PROCEDURE — 82306 VITAMIN D 25 HYDROXY: CPT

## 2024-07-11 PROCEDURE — 84443 ASSAY THYROID STIM HORMONE: CPT

## 2024-07-11 PROCEDURE — 80061 LIPID PANEL: CPT

## 2024-07-11 PROCEDURE — 36415 COLL VENOUS BLD VENIPUNCTURE: CPT

## 2024-07-11 PROCEDURE — 84439 ASSAY OF FREE THYROXINE: CPT

## 2024-07-18 NOTE — PROGRESS NOTES
Patient Name:  Luca Mckeon  Patient :  1935  Patient MRN:  [id number]     Primary Oncologist: Kim Harvey MD  Referring Provider: Emanuel Aceves MD     Date of Service: 8/15/2024       Chief Complaint:    No chief complaint on file.    He came in for follow up visit.     Patient Active Problem List:     Stage 3b chronic kidney disease      Persistent proteinuria     Hypertensive heart and renal disease     History of anemia due to CKD     HPI:   Luca Mckeon is a pleasant 88 year old male patient who was referred for evaluation of anemia. He reported that he has sickle cell trait.  2021 WBC 5.9, Hg 8.8, hematocrit 29.2, MCV 89.6, platelet 190.  Peripheral smear showed anisocytosis, polychromasia, ovalocytes.  Ferritin 394, iron 23, TIBC 197, folate 12.9, B12 more than 2000. TSH  0.06.  Free T4 normal at 1.31.  Creatinine 2.9, total protein 7.4, albumin 3.4.  SPEP showed a faint monoclonal spike noted measuring 500 mg/dL.  VERNELL showed faint monoclonal free lambda light chain proteins.    May 26, 2021 CT abdomen compared to study in 2020, 2019, 2008 showed :  No significant interval change in bilateral renal lesions, most notably a 4.3 cm complicated cystic lesion of the upper pole of the right kidney which is at least a Bosniak 2F lesion.  This dominant lesion is decreased in size since  and is probably benign.  These renal lesions could be further evaluated with renal mass protocol MRI at patient's current renal function if clinically indicated.    2022 DEXA: osteopenia. I recommend to take vitamin D and calcium.  I reviewed his records from epic.  He has been anemic at least since .  Hemoglobin in  was normal.  2020 he had episode of rectal bleed and received 3 units of packed red blood cell.  Reportedly he had upper and lower endoscopic study in the first part of  by Dr. Grullon.  4-5 polyps were removed from the colon and he has

## 2024-08-08 ENCOUNTER — HOSPITAL ENCOUNTER (OUTPATIENT)
Dept: INFUSION THERAPY | Age: 89
Discharge: HOME | End: 2024-08-08
Payer: MEDICARE

## 2024-08-08 DIAGNOSIS — D64.9 ANEMIA, UNSPECIFIED TYPE: ICD-10-CM

## 2024-08-08 DIAGNOSIS — D47.2 MGUS (MONOCLONAL GAMMOPATHY OF UNKNOWN SIGNIFICANCE): ICD-10-CM

## 2024-08-08 LAB
ALBUMIN SERPL-MCNC: 3.6 GM/DL (ref 3.4–5)
ALP BLD-CCNC: 81 IU/L (ref 40–129)
ALT SERPL-CCNC: 7 U/L (ref 10–40)
ANION GAP SERPL CALCULATED.3IONS-SCNC: 15 MMOL/L (ref 7–16)
AST SERPL-CCNC: 12 IU/L (ref 15–37)
BASOPHILS ABSOLUTE: 0 K/CU MM
BASOPHILS RELATIVE PERCENT: 0.4 % (ref 0–1)
BILIRUB SERPL-MCNC: 0.2 MG/DL (ref 0–1)
BUN SERPL-MCNC: 39 MG/DL (ref 6–23)
CALCIUM SERPL-MCNC: 9.3 MG/DL (ref 8.3–10.6)
CHLORIDE BLD-SCNC: 104 MMOL/L (ref 99–110)
CO2: 23 MMOL/L (ref 21–32)
CREAT SERPL-MCNC: 3.1 MG/DL (ref 0.9–1.3)
DIFFERENTIAL TYPE: ABNORMAL
EOSINOPHILS ABSOLUTE: 0.2 K/CU MM
EOSINOPHILS RELATIVE PERCENT: 3.2 % (ref 0–3)
GFR, ESTIMATED: 19 ML/MIN/1.73M2
GLUCOSE SERPL-MCNC: 110 MG/DL (ref 70–99)
HCT VFR BLD CALC: 27.2 % (ref 42–52)
HEMOGLOBIN: 8.5 GM/DL (ref 13.5–18)
LACTATE DEHYDROGENASE: 163 IU/L (ref 120–246)
LYMPHOCYTES ABSOLUTE: 2.7 K/CU MM
LYMPHOCYTES RELATIVE PERCENT: 36.6 % (ref 24–44)
MCH RBC QN AUTO: 28.3 PG (ref 27–31)
MCHC RBC AUTO-ENTMCNC: 31.3 % (ref 32–36)
MCV RBC AUTO: 90.7 FL (ref 78–100)
MONOCYTES ABSOLUTE: 0.7 K/CU MM
MONOCYTES RELATIVE PERCENT: 10 % (ref 0–4)
NEUTROPHILS ABSOLUTE: 3.6 K/CU MM
NEUTROPHILS RELATIVE PERCENT: 49.8 % (ref 36–66)
PDW BLD-RTO: 14.5 % (ref 11.7–14.9)
PLATELET # BLD: 130 K/CU MM (ref 140–440)
PMV BLD AUTO: 10.6 FL (ref 7.5–11.1)
POTASSIUM SERPL-SCNC: 4 MMOL/L (ref 3.5–5.1)
RBC # BLD: 3 M/CU MM (ref 4.6–6.2)
SODIUM BLD-SCNC: 142 MMOL/L (ref 135–145)
TOTAL PROTEIN: 7.6 GM/DL (ref 6.4–8.2)
WBC # BLD: 7.3 K/CU MM (ref 4–10.5)

## 2024-08-08 PROCEDURE — 84165 PROTEIN E-PHORESIS SERUM: CPT

## 2024-08-08 PROCEDURE — 83883 ASSAY NEPHELOMETRY NOT SPEC: CPT

## 2024-08-08 PROCEDURE — 86320 SERUM IMMUNOELECTROPHORESIS: CPT

## 2024-08-08 PROCEDURE — 83615 LACTATE (LD) (LDH) ENZYME: CPT

## 2024-08-08 PROCEDURE — 84155 ASSAY OF PROTEIN SERUM: CPT

## 2024-08-08 PROCEDURE — 36415 COLL VENOUS BLD VENIPUNCTURE: CPT

## 2024-08-08 PROCEDURE — 80053 COMPREHEN METABOLIC PANEL: CPT

## 2024-08-08 PROCEDURE — 82232 ASSAY OF BETA-2 PROTEIN: CPT

## 2024-08-08 PROCEDURE — 85025 COMPLETE CBC W/AUTO DIFF WBC: CPT

## 2024-08-09 LAB
ALBUMIN SERPL ELPH-MCNC: 3.2 GM/DL (ref 3.2–5.6)
ALPHA-1-GLOBULIN: 0.4 GM/DL (ref 0.1–0.4)
ALPHA-2-GLOBULIN: 0.9 GM/DL (ref 0.4–1.2)
BETA GLOBULIN: 1.1 GM/DL (ref 0.5–1.3)
GAMMA GLOBULIN: 1.9 GM/DL (ref 0.5–1.6)
SPEP INTERPRETATION: ABNORMAL
SPEP INTERPRETATION: NORMAL
TOTAL PROTEIN: 7.6 GM/DL (ref 6.4–8.2)

## 2024-08-11 LAB
B2 MICROGLOB SERPL-MCNC: 10.1 MG/L
KAPPA LC FREE SER-MCNC: 88.55 MG/L (ref 3.3–19.4)
KAPPA LC FREE/LAMBDA FREE SER NEPH: 0.68 {RATIO} (ref 0.26–1.65)
LAMBDA LC FREE SERPL-MCNC: 131.17 MG/L (ref 5.71–26.3)

## 2024-08-15 ENCOUNTER — OFFICE VISIT (OUTPATIENT)
Dept: ONCOLOGY | Age: 89
End: 2024-08-15
Payer: MEDICARE

## 2024-08-15 ENCOUNTER — HOSPITAL ENCOUNTER (OUTPATIENT)
Dept: INFUSION THERAPY | Age: 89
Discharge: HOME | End: 2024-08-15
Payer: MEDICARE

## 2024-08-15 VITALS
HEART RATE: 70 BPM | WEIGHT: 165.8 LBS | TEMPERATURE: 98 F | HEIGHT: 71 IN | DIASTOLIC BLOOD PRESSURE: 56 MMHG | OXYGEN SATURATION: 96 % | SYSTOLIC BLOOD PRESSURE: 110 MMHG | RESPIRATION RATE: 16 BRPM | BODY MASS INDEX: 23.21 KG/M2

## 2024-08-15 DIAGNOSIS — D47.2 MGUS (MONOCLONAL GAMMOPATHY OF UNKNOWN SIGNIFICANCE): Primary | ICD-10-CM

## 2024-08-15 PROCEDURE — 1123F ACP DISCUSS/DSCN MKR DOCD: CPT | Performed by: INTERNAL MEDICINE

## 2024-08-15 PROCEDURE — 99213 OFFICE O/P EST LOW 20 MIN: CPT | Performed by: INTERNAL MEDICINE

## 2024-08-15 PROCEDURE — G8427 DOCREV CUR MEDS BY ELIG CLIN: HCPCS | Performed by: INTERNAL MEDICINE

## 2024-08-15 PROCEDURE — 1036F TOBACCO NON-USER: CPT | Performed by: INTERNAL MEDICINE

## 2024-08-15 PROCEDURE — G8420 CALC BMI NORM PARAMETERS: HCPCS | Performed by: INTERNAL MEDICINE

## 2024-08-15 PROCEDURE — 99211 OFF/OP EST MAY X REQ PHY/QHP: CPT

## 2024-08-15 NOTE — PROGRESS NOTES
MA Rooming Questions  Patient: Luca Mckeon  MRN: [id number]    Date: 8/15/2024        1. Do you have any new issues?   yes - c/o pain in left side of neck- started last weak, fatigue, general weakness-especially in AM.          2. Do you need any refills on medications?    no    3. Have you had any imaging done since your last visit?   no    4. Have you been hospitalized or seen in the emergency room since your last visit here?   no    5. Did the patient have a depression screening completed today? No    No data recorded     PHQ-9 Given to (if applicable):               PHQ-9 Score (if applicable):                     [] Positive     []  Negative              Does question #9 need addressed (if applicable)                     [] Yes    []  No               Kirstin Jacobson CMA

## 2024-10-11 ENCOUNTER — HOSPITAL ENCOUNTER (OUTPATIENT)
Age: 89
Discharge: HOME | End: 2024-10-11
Payer: MEDICARE

## 2024-10-11 DIAGNOSIS — I10 PRIMARY HYPERTENSION: ICD-10-CM

## 2024-10-11 DIAGNOSIS — R80.1 PERSISTENT PROTEINURIA: ICD-10-CM

## 2024-10-11 DIAGNOSIS — N17.9 ACUTE KIDNEY INJURY SUPERIMPOSED ON CKD (HCC): ICD-10-CM

## 2024-10-11 DIAGNOSIS — N18.4 CHRONIC KIDNEY DISEASE, STAGE IV (SEVERE) (HCC): ICD-10-CM

## 2024-10-11 DIAGNOSIS — N18.9 ACUTE KIDNEY INJURY SUPERIMPOSED ON CKD (HCC): ICD-10-CM

## 2024-10-11 LAB
ANION GAP SERPL CALCULATED.3IONS-SCNC: 12 MMOL/L (ref 9–17)
BUN SERPL-MCNC: 37 MG/DL (ref 7–20)
CALCIUM SERPL-MCNC: 9.7 MG/DL (ref 8.3–10.6)
CHLORIDE SERPL-SCNC: 106 MMOL/L (ref 99–110)
CO2 SERPL-SCNC: 24 MMOL/L (ref 21–32)
CREAT SERPL-MCNC: 2.9 MG/DL (ref 0.8–1.3)
GFR, ESTIMATED: 21 ML/MIN/1.73M2
GLUCOSE SERPL-MCNC: 110 MG/DL (ref 74–99)
POTASSIUM SERPL-SCNC: 4.2 MMOL/L (ref 3.5–5.1)
SODIUM SERPL-SCNC: 142 MMOL/L (ref 136–145)

## 2024-10-11 PROCEDURE — 36415 COLL VENOUS BLD VENIPUNCTURE: CPT

## 2024-10-11 PROCEDURE — 80048 BASIC METABOLIC PNL TOTAL CA: CPT

## 2024-10-13 NOTE — PROGRESS NOTES
Patient Name:  Luca Mckeon  Patient :  1935  Patient MRN:  [id number]     Primary Oncologist: Kim Harvey MD  Referring Provider: Emanuel Aceves MD     Date of Service: 2024       Chief Complaint:    Chief Complaint   Patient presents with    Follow-up     He came in for follow up visit.     Patient Active Problem List:     Stage 3b chronic kidney disease      Persistent proteinuria     Hypertensive heart and renal disease     History of anemia due to CKD     HPI:   Luca Mckeon is a pleasant 89 year old male patient who was referred for evaluation of anemia. He reported that he has sickle cell trait.  2021 WBC 5.9, Hg 8.8, hematocrit 29.2, MCV 89.6, platelet 190.  Peripheral smear showed anisocytosis, polychromasia, ovalocytes.  Ferritin 394, iron 23, TIBC 197, folate 12.9, B12 more than 2000. TSH  0.06.  Free T4 normal at 1.31.  Creatinine 2.9, total protein 7.4, albumin 3.4.  SPEP showed a faint monoclonal spike noted measuring 500 mg/dL.  VERNELL showed faint monoclonal free lambda light chain proteins.    May 26, 2021 CT abdomen compared to study in 2020, 2019, 2008 showed :  No significant interval change in bilateral renal lesions, most notably a 4.3 cm complicated cystic lesion of the upper pole of the right kidney which is at least a Bosniak 2F lesion.  This dominant lesion is decreased in size since  and is probably benign.  These renal lesions could be further evaluated with renal mass protocol MRI at patient's current renal function if clinically indicated.    2022 DEXA: osteopenia. I recommend to take vitamin D and calcium.  I reviewed his records from epic.  He has been anemic at least since .  Hemoglobin in  was normal.  2020 he had episode of rectal bleed and received 3 units of packed red blood cell.  Reportedly he had upper and lower endoscopic study in the first part of  by Dr. Grullon.  4-5 polyps were removed

## 2024-10-17 ENCOUNTER — HOSPITAL ENCOUNTER (OUTPATIENT)
Age: 89
Discharge: HOME | End: 2024-10-17
Payer: MEDICARE

## 2024-10-17 PROCEDURE — 84153 ASSAY OF PSA TOTAL: CPT

## 2024-10-17 PROCEDURE — 84154 ASSAY OF PSA FREE: CPT

## 2024-10-17 PROCEDURE — 84402 ASSAY OF FREE TESTOSTERONE: CPT

## 2024-10-17 PROCEDURE — 84270 ASSAY OF SEX HORMONE GLOBUL: CPT

## 2024-10-17 PROCEDURE — 36415 COLL VENOUS BLD VENIPUNCTURE: CPT

## 2024-10-17 PROCEDURE — 84403 ASSAY OF TOTAL TESTOSTERONE: CPT

## 2024-10-20 LAB
PROSTATE SPECIFIC ANTIGEN FREE: 0.9 NG/ML
PROSTATE SPECIFIC ANTIGEN PERCENT FREE: 10 %
PROSTATE SPECIFIC ANTIGEN: 8.6 NG/ML (ref 0–4)
SEX HORMONE BINDING GLOBULIN: 72 NMOL/L (ref 19–76)
TESTOSTERONE FREE PERCENT: 1.1 % (ref 1.6–2.9)
TESTOSTERONE FREE-MALE: 44 PG/ML (ref 47–244)
TESTOSTERONE TOTAL-MALE: 400 NG/DL (ref 300–720)

## 2024-11-07 ENCOUNTER — HOSPITAL ENCOUNTER (OUTPATIENT)
Dept: INFUSION THERAPY | Age: 89
Discharge: HOME | End: 2024-11-07
Payer: MEDICARE

## 2024-11-07 DIAGNOSIS — D47.2 MGUS (MONOCLONAL GAMMOPATHY OF UNKNOWN SIGNIFICANCE): ICD-10-CM

## 2024-11-07 LAB
ALBUMIN PERCENT: NORMAL %
ALBUMIN SERPL-MCNC: 3.7 G/DL (ref 3.4–5)
ALBUMIN SERPL-MCNC: NORMAL G/DL
ALBUMIN/GLOB SERPL: 1 {RATIO} (ref 1.1–2.2)
ALP SERPL-CCNC: 83 U/L (ref 40–129)
ALPHA 2 PERCENT: NORMAL %
ALPHA1 GLOB SERPL ELPH-MCNC: NORMAL %
ALPHA1 GLOB SERPL ELPH-MCNC: NORMAL G/DL
ALPHA2 GLOB SERPL ELPH-MCNC: NORMAL G/DL
ALT SERPL-CCNC: 14 U/L (ref 10–40)
ANION GAP SERPL CALCULATED.3IONS-SCNC: 14 MMOL/L (ref 9–17)
AST SERPL-CCNC: 19 U/L (ref 15–37)
B-GLOBULIN SERPL ELPH-MCNC: NORMAL %
B-GLOBULIN SERPL ELPH-MCNC: NORMAL G/DL
BASOPHILS # BLD: 0.02 K/UL
BASOPHILS NFR BLD: 0 % (ref 0–1)
BILIRUB SERPL-MCNC: 0.2 MG/DL (ref 0–1)
BUN SERPL-MCNC: 47 MG/DL (ref 7–20)
CALCIUM SERPL-MCNC: 9.4 MG/DL (ref 8.3–10.6)
CHLORIDE SERPL-SCNC: 102 MMOL/L (ref 99–110)
CO2 SERPL-SCNC: 24 MMOL/L (ref 21–32)
CREAT SERPL-MCNC: 3.1 MG/DL (ref 0.8–1.3)
EOSINOPHIL # BLD: 0.21 K/UL
EOSINOPHILS RELATIVE PERCENT: 3 % (ref 0–3)
ERYTHROCYTE [DISTWIDTH] IN BLOOD BY AUTOMATED COUNT: 14.9 % (ref 11.7–14.9)
FERRITIN SERPL-MCNC: 133 NG/ML (ref 30–400)
GAMMA GLOB SERPL ELPH-MCNC: NORMAL G/DL
GAMMA GLOBULIN %: NORMAL %
GFR, ESTIMATED: 19 ML/MIN/1.73M2
GLUCOSE SERPL-MCNC: 115 MG/DL (ref 74–99)
HCT VFR BLD AUTO: 27.5 % (ref 42–52)
HGB BLD-MCNC: 8.6 G/DL (ref 13.5–18)
IGA SERPL-MCNC: 631 MG/DL (ref 70–400)
IGG SERPL-MCNC: 1783 MG/DL (ref 700–1600)
IGM SERPL-MCNC: 164 MG/DL (ref 40–230)
IRON SATN MFR SERPL: 16 % (ref 15–50)
IRON SERPL-MCNC: 41 UG/DL (ref 59–158)
LDH SERPL-CCNC: 157 U/L (ref 100–190)
LYMPHOCYTES NFR BLD: 2.49 K/UL
LYMPHOCYTES RELATIVE PERCENT: 39 % (ref 24–44)
M PROTEIN 2 SERPL ELPH-MCNC: NORMAL G/DL
M PROTEIN SERPL ELPH-MCNC: NORMAL G/DL
MCH RBC QN AUTO: 28.4 PG (ref 27–31)
MCHC RBC AUTO-ENTMCNC: 31.3 G/DL (ref 32–36)
MCV RBC AUTO: 90.8 FL (ref 78–100)
MONOCYTES NFR BLD: 0.62 K/UL
MONOCYTES NFR BLD: 10 % (ref 0–4)
NEUTROPHILS NFR BLD: 48 % (ref 36–66)
NEUTS SEG NFR BLD: 3.06 K/UL
PATHOLOGIST: NORMAL
PLATELET # BLD AUTO: 138 K/UL (ref 140–440)
PMV BLD AUTO: 11.5 FL (ref 7.5–11.1)
POTASSIUM SERPL-SCNC: 4 MMOL/L (ref 3.5–5.1)
PROT PATTERN SERPL ELPH-IMP: NORMAL
PROT SERPL-MCNC: 7.3 G/DL
PROT SERPL-MCNC: 7.4 G/DL (ref 6.4–8.2)
RBC # BLD AUTO: 3.03 M/UL (ref 4.6–6.2)
SODIUM SERPL-SCNC: 140 MMOL/L (ref 136–145)
TIBC SERPL-MCNC: 258 UG/DL (ref 260–445)
TOTAL PROT. SUM,%: NORMAL %
TOTAL PROT. SUM: NORMAL G/DL
UNSATURATED IRON BINDING CAPACITY: 217 UG/DL (ref 110–370)
WBC OTHER # BLD: 6.4 K/UL (ref 4–10.5)

## 2024-11-07 PROCEDURE — 84155 ASSAY OF PROTEIN SERUM: CPT

## 2024-11-07 PROCEDURE — 36415 COLL VENOUS BLD VENIPUNCTURE: CPT

## 2024-11-07 PROCEDURE — 82728 ASSAY OF FERRITIN: CPT

## 2024-11-07 PROCEDURE — 82784 ASSAY IGA/IGD/IGG/IGM EACH: CPT

## 2024-11-07 PROCEDURE — 86334 IMMUNOFIX E-PHORESIS SERUM: CPT

## 2024-11-07 PROCEDURE — 83550 IRON BINDING TEST: CPT

## 2024-11-07 PROCEDURE — 84165 PROTEIN E-PHORESIS SERUM: CPT

## 2024-11-07 PROCEDURE — 83615 LACTATE (LD) (LDH) ENZYME: CPT

## 2024-11-07 PROCEDURE — 80053 COMPREHEN METABOLIC PANEL: CPT

## 2024-11-07 PROCEDURE — 83521 IG LIGHT CHAINS FREE EACH: CPT

## 2024-11-07 PROCEDURE — 82232 ASSAY OF BETA-2 PROTEIN: CPT

## 2024-11-07 PROCEDURE — 83540 ASSAY OF IRON: CPT

## 2024-11-07 PROCEDURE — 85025 COMPLETE CBC W/AUTO DIFF WBC: CPT

## 2024-11-08 LAB
COMMENT: NORMAL
KAPPA FREE LIGHT CHAIN: NORMAL
KAPPA/LAMBDA RATIO: NORMAL
LAMBDA FREE LIGHT CHAIN: NORMAL

## 2024-11-10 LAB — BETA-2 MICROGLOBULIN: 10.3 MG/L

## 2024-11-11 ENCOUNTER — HOSPITAL ENCOUNTER (OUTPATIENT)
Dept: INFUSION THERAPY | Age: 89
Discharge: HOME | End: 2024-11-11
Payer: MEDICARE

## 2024-11-11 ENCOUNTER — OFFICE VISIT (OUTPATIENT)
Dept: ONCOLOGY | Age: 89
End: 2024-11-11
Payer: MEDICARE

## 2024-11-11 VITALS
OXYGEN SATURATION: 97 % | HEIGHT: 71 IN | TEMPERATURE: 97.5 F | WEIGHT: 171.6 LBS | SYSTOLIC BLOOD PRESSURE: 150 MMHG | BODY MASS INDEX: 24.02 KG/M2 | DIASTOLIC BLOOD PRESSURE: 64 MMHG | HEART RATE: 66 BPM

## 2024-11-11 DIAGNOSIS — D47.2 MGUS (MONOCLONAL GAMMOPATHY OF UNKNOWN SIGNIFICANCE): Primary | ICD-10-CM

## 2024-11-11 PROCEDURE — G8427 DOCREV CUR MEDS BY ELIG CLIN: HCPCS | Performed by: INTERNAL MEDICINE

## 2024-11-11 PROCEDURE — 99214 OFFICE O/P EST MOD 30 MIN: CPT | Performed by: INTERNAL MEDICINE

## 2024-11-11 PROCEDURE — 1126F AMNT PAIN NOTED NONE PRSNT: CPT | Performed by: INTERNAL MEDICINE

## 2024-11-11 PROCEDURE — 1123F ACP DISCUSS/DSCN MKR DOCD: CPT | Performed by: INTERNAL MEDICINE

## 2024-11-11 PROCEDURE — G8484 FLU IMMUNIZE NO ADMIN: HCPCS | Performed by: INTERNAL MEDICINE

## 2024-11-11 PROCEDURE — 1159F MED LIST DOCD IN RCRD: CPT | Performed by: INTERNAL MEDICINE

## 2024-11-11 PROCEDURE — 99211 OFF/OP EST MAY X REQ PHY/QHP: CPT

## 2024-11-11 PROCEDURE — G8420 CALC BMI NORM PARAMETERS: HCPCS | Performed by: INTERNAL MEDICINE

## 2024-11-11 PROCEDURE — 1036F TOBACCO NON-USER: CPT | Performed by: INTERNAL MEDICINE

## 2024-11-11 ASSESSMENT — PATIENT HEALTH QUESTIONNAIRE - PHQ9
SUM OF ALL RESPONSES TO PHQ QUESTIONS 1-9: 2
SUM OF ALL RESPONSES TO PHQ QUESTIONS 1-9: 2
SUM OF ALL RESPONSES TO PHQ9 QUESTIONS 1 & 2: 2
SUM OF ALL RESPONSES TO PHQ QUESTIONS 1-9: 2
1. LITTLE INTEREST OR PLEASURE IN DOING THINGS: SEVERAL DAYS
SUM OF ALL RESPONSES TO PHQ QUESTIONS 1-9: 2
2. FEELING DOWN, DEPRESSED OR HOPELESS: SEVERAL DAYS

## 2024-11-11 NOTE — PROGRESS NOTES
MA Rooming Questions  Patient: Luca Mckeon  MRN: [id number]    Date: 11/11/2024        1. Do you have any new issues?   no         2. Do you need any refills on medications?    no    3. Have you had any imaging done since your last visit?   no    4. Have you been hospitalized or seen in the emergency room since your last visit here?   no    5. Did the patient have a depression screening completed today? Yes    PHQ-9 Total Score: 2 (11/11/2024  2:10 PM)       PHQ-9 Given to (if applicable):               PHQ-9 Score (if applicable):                     [] Positive     []  Negative              Does question #9 need addressed (if applicable)                     [] Yes    []  No               Tabatha Painter MA

## 2024-11-12 LAB
COMMENT: ABNORMAL
KAPPA FREE LIGHT CHAIN: 148 MG/L (ref 2.37–20.73)
KAPPA/LAMBDA RATIO: 1.24 (ref 0.22–1.74)
LAMBDA FREE LIGHT CHAIN: 119 MG/L (ref 4.23–27.69)

## 2024-11-13 LAB — INTERPRETATION SERPL IFE-IMP: NORMAL

## 2024-11-15 LAB
ALBUMIN SERPL-MCNC: 2.8 G/DL (ref 3.2–5.6)
ALPHA1 GLOB SERPL ELPH-MCNC: 0.2 G/DL (ref 0.1–0.4)
ALPHA2 GLOB SERPL ELPH-MCNC: 0.6 G/DL (ref 0.4–1.2)
B-GLOBULIN SERPL ELPH-MCNC: 1.5 G/DL (ref 0.5–1.3)
GAMMA GLOB SERPL ELPH-MCNC: 2.3 G/DL (ref 0.5–1.6)
PROT PATTERN SERPL ELPH-IMP: ABNORMAL
PROT SERPL-MCNC: 7.3 G/DL

## 2024-12-09 ENCOUNTER — TRANSCRIBE ORDERS (OUTPATIENT)
Dept: ADMINISTRATIVE | Age: 89
End: 2024-12-09

## 2024-12-09 DIAGNOSIS — C61 MALIGNANT NEOPLASM OF PROSTATE (HCC): Primary | ICD-10-CM

## 2024-12-20 ENCOUNTER — HOSPITAL ENCOUNTER (OUTPATIENT)
Dept: PET IMAGING | Age: 89
Discharge: HOME | End: 2024-12-20
Attending: SPECIALIST
Payer: MEDICARE

## 2024-12-20 DIAGNOSIS — C61 MALIGNANT NEOPLASM OF PROSTATE (HCC): ICD-10-CM

## 2024-12-20 PROCEDURE — 3430000000 HC RX DIAGNOSTIC RADIOPHARMACEUTICAL: Performed by: SPECIALIST

## 2024-12-20 PROCEDURE — A9595 HC RX DIAGNOSTIC RADIOPHARMACEUTICAL: HCPCS | Performed by: SPECIALIST

## 2024-12-20 PROCEDURE — 2500000003 HC RX 250 WO HCPCS: Performed by: SPECIALIST

## 2024-12-20 RX ORDER — SODIUM CHLORIDE 0.9 % (FLUSH) 0.9 %
10 SYRINGE (ML) INJECTION PRN
Status: COMPLETED | OUTPATIENT
Start: 2024-12-20 | End: 2024-12-20

## 2024-12-20 RX ADMIN — PIFLUFOLASTAT F-18 9.73 MILLICURIE: 80 INJECTION INTRAVENOUS at 13:15

## 2024-12-20 RX ADMIN — SODIUM CHLORIDE, PRESERVATIVE FREE 10 ML: 5 INJECTION INTRAVENOUS at 13:15

## 2025-01-17 ENCOUNTER — HOSPITAL ENCOUNTER (OUTPATIENT)
Age: 89
Discharge: HOME | End: 2025-01-17
Payer: MEDICARE

## 2025-01-17 DIAGNOSIS — R80.1 PERSISTENT PROTEINURIA: ICD-10-CM

## 2025-01-17 DIAGNOSIS — I10 PRIMARY HYPERTENSION: ICD-10-CM

## 2025-01-17 DIAGNOSIS — N18.4 CHRONIC KIDNEY DISEASE, STAGE IV (SEVERE) (HCC): ICD-10-CM

## 2025-01-17 DIAGNOSIS — N18.9 ACUTE KIDNEY INJURY SUPERIMPOSED ON CKD (HCC): ICD-10-CM

## 2025-01-17 DIAGNOSIS — N17.9 ACUTE KIDNEY INJURY SUPERIMPOSED ON CKD (HCC): ICD-10-CM

## 2025-01-17 LAB
ANION GAP SERPL CALCULATED.3IONS-SCNC: 11 MMOL/L (ref 9–17)
BUN SERPL-MCNC: 49 MG/DL (ref 7–20)
CALCIUM SERPL-MCNC: 9.7 MG/DL (ref 8.3–10.6)
CHLORIDE SERPL-SCNC: 105 MMOL/L (ref 99–110)
CO2 SERPL-SCNC: 25 MMOL/L (ref 21–32)
CREAT SERPL-MCNC: 3.2 MG/DL (ref 0.8–1.3)
CREAT UR-MCNC: 68.4 MG/DL (ref 39–259)
GFR, ESTIMATED: 18 ML/MIN/1.73M2
GLUCOSE SERPL-MCNC: 109 MG/DL (ref 74–99)
POTASSIUM SERPL-SCNC: 4 MMOL/L (ref 3.5–5.1)
SODIUM SERPL-SCNC: 140 MMOL/L (ref 136–145)
TOTAL PROTEIN, URINE: 22 MG/DL
URINE TOTAL PROTEIN CREATININE RATIO: 0.33 (ref 0–0.2)

## 2025-01-17 PROCEDURE — 84153 ASSAY OF PSA TOTAL: CPT

## 2025-01-17 PROCEDURE — 82570 ASSAY OF URINE CREATININE: CPT

## 2025-01-17 PROCEDURE — 84270 ASSAY OF SEX HORMONE GLOBUL: CPT

## 2025-01-17 PROCEDURE — 84156 ASSAY OF PROTEIN URINE: CPT

## 2025-01-17 PROCEDURE — 84402 ASSAY OF FREE TESTOSTERONE: CPT

## 2025-01-17 PROCEDURE — 80048 BASIC METABOLIC PNL TOTAL CA: CPT

## 2025-01-17 PROCEDURE — 84154 ASSAY OF PSA FREE: CPT

## 2025-01-17 PROCEDURE — 84403 ASSAY OF TOTAL TESTOSTERONE: CPT

## 2025-01-20 LAB
PROSTATE SPECIFIC ANTIGEN FREE: <0.1 NG/ML
PROSTATE SPECIFIC ANTIGEN PERCENT FREE: <12 %
PROSTATE SPECIFIC ANTIGEN: 0.8 NG/ML (ref 0–4)

## 2025-01-21 LAB
SEX HORMONE BINDING GLOBULIN: 77 NMOL/L (ref 19–76)
TESTOSTERONE FREE PERCENT: <1 % (ref 1.6–2.9)
TESTOSTERONE FREE-MALE: <1 PG/ML (ref 47–244)
TESTOSTERONE TOTAL-MALE: <3 NG/DL (ref 300–720)

## 2025-02-04 ENCOUNTER — HOSPITAL ENCOUNTER (OUTPATIENT)
Dept: INFUSION THERAPY | Age: 89
Discharge: HOME | End: 2025-02-04
Payer: MEDICARE

## 2025-02-04 DIAGNOSIS — D47.2 MGUS (MONOCLONAL GAMMOPATHY OF UNKNOWN SIGNIFICANCE): ICD-10-CM

## 2025-02-04 LAB
ALBUMIN SERPL-MCNC: 3.7 G/DL (ref 3.4–5)
ALBUMIN/GLOB SERPL: 1 {RATIO} (ref 1.1–2.2)
ALP SERPL-CCNC: 79 U/L (ref 40–129)
ALT SERPL-CCNC: 12 U/L (ref 10–40)
ANION GAP SERPL CALCULATED.3IONS-SCNC: 13 MMOL/L (ref 9–17)
AST SERPL-CCNC: 20 U/L (ref 15–37)
BASOPHILS # BLD: 0.03 K/UL
BASOPHILS NFR BLD: 1 % (ref 0–1)
BILIRUB SERPL-MCNC: 0.2 MG/DL (ref 0–1)
BUN SERPL-MCNC: 55 MG/DL (ref 7–20)
CALCIUM SERPL-MCNC: 9.7 MG/DL (ref 8.3–10.6)
CHLORIDE SERPL-SCNC: 105 MMOL/L (ref 99–110)
CO2 SERPL-SCNC: 24 MMOL/L (ref 21–32)
CREAT SERPL-MCNC: 3.4 MG/DL (ref 0.8–1.3)
EOSINOPHIL # BLD: 0.25 K/UL
EOSINOPHILS RELATIVE PERCENT: 4 % (ref 0–3)
ERYTHROCYTE [DISTWIDTH] IN BLOOD BY AUTOMATED COUNT: 14.6 % (ref 11.7–14.9)
ERYTHROCYTE [SEDIMENTATION RATE] IN BLOOD BY WESTERGREN METHOD: 35 MM/HR (ref 0–20)
GFR, ESTIMATED: 17 ML/MIN/1.73M2
GLUCOSE SERPL-MCNC: 142 MG/DL (ref 74–99)
HCT VFR BLD AUTO: 25.5 % (ref 42–52)
HGB BLD-MCNC: 8.1 G/DL (ref 13.5–18)
IGA SERPL-MCNC: 651 MG/DL (ref 70–400)
IGG SERPL-MCNC: 1811 MG/DL (ref 700–1600)
IGM SERPL-MCNC: 155 MG/DL (ref 40–230)
LDH SERPL-CCNC: 162 U/L (ref 100–190)
LYMPHOCYTES NFR BLD: 2.37 K/UL
LYMPHOCYTES RELATIVE PERCENT: 38 % (ref 24–44)
MCH RBC QN AUTO: 28.8 PG (ref 27–31)
MCHC RBC AUTO-ENTMCNC: 31.8 G/DL (ref 32–36)
MCV RBC AUTO: 90.7 FL (ref 78–100)
MONOCYTES NFR BLD: 0.62 K/UL
MONOCYTES NFR BLD: 10 % (ref 0–4)
NEUTROPHILS NFR BLD: 47 % (ref 36–66)
NEUTS SEG NFR BLD: 2.91 K/UL
PLATELET # BLD AUTO: 133 K/UL (ref 140–440)
PMV BLD AUTO: 10.9 FL (ref 7.5–11.1)
POTASSIUM SERPL-SCNC: 4.1 MMOL/L (ref 3.5–5.1)
PROT SERPL-MCNC: 7.3 G/DL (ref 6.4–8.2)
RBC # BLD AUTO: 2.81 M/UL (ref 4.6–6.2)
SODIUM SERPL-SCNC: 143 MMOL/L (ref 136–145)
WBC OTHER # BLD: 6.2 K/UL (ref 4–10.5)

## 2025-02-04 PROCEDURE — 85652 RBC SED RATE AUTOMATED: CPT

## 2025-02-04 PROCEDURE — 82232 ASSAY OF BETA-2 PROTEIN: CPT

## 2025-02-04 PROCEDURE — 36415 COLL VENOUS BLD VENIPUNCTURE: CPT

## 2025-02-04 PROCEDURE — 83615 LACTATE (LD) (LDH) ENZYME: CPT

## 2025-02-04 PROCEDURE — 85025 COMPLETE CBC W/AUTO DIFF WBC: CPT

## 2025-02-04 PROCEDURE — 82784 ASSAY IGA/IGD/IGG/IGM EACH: CPT

## 2025-02-04 PROCEDURE — 86334 IMMUNOFIX E-PHORESIS SERUM: CPT

## 2025-02-04 PROCEDURE — 84155 ASSAY OF PROTEIN SERUM: CPT

## 2025-02-04 PROCEDURE — 84165 PROTEIN E-PHORESIS SERUM: CPT

## 2025-02-04 PROCEDURE — 83521 IG LIGHT CHAINS FREE EACH: CPT

## 2025-02-04 PROCEDURE — 80053 COMPREHEN METABOLIC PANEL: CPT

## 2025-02-07 LAB
BETA-2 MICROGLOBULIN: 11.7 MG/L
MISCELLANEOUS LAB TEST RESULT: NORMAL
MISCELLANEOUS LAB TEST RESULT: NORMAL
TEST NAME: NORMAL
TEST NAME: NORMAL

## 2025-02-10 LAB
MISCELLANEOUS LAB TEST RESULT: ABNORMAL
TEST NAME: ABNORMAL

## 2025-02-11 ENCOUNTER — HOSPITAL ENCOUNTER (OUTPATIENT)
Dept: INFUSION THERAPY | Age: 89
Discharge: HOME | End: 2025-02-11
Payer: MEDICARE

## 2025-02-11 ENCOUNTER — OFFICE VISIT (OUTPATIENT)
Dept: ONCOLOGY | Age: 89
End: 2025-02-11
Payer: MEDICARE

## 2025-02-11 VITALS
WEIGHT: 172.6 LBS | SYSTOLIC BLOOD PRESSURE: 139 MMHG | DIASTOLIC BLOOD PRESSURE: 66 MMHG | OXYGEN SATURATION: 100 % | TEMPERATURE: 97.8 F | BODY MASS INDEX: 24.16 KG/M2 | HEART RATE: 86 BPM | HEIGHT: 71 IN | RESPIRATION RATE: 16 BRPM

## 2025-02-11 DIAGNOSIS — D47.2 MGUS (MONOCLONAL GAMMOPATHY OF UNKNOWN SIGNIFICANCE): Primary | ICD-10-CM

## 2025-02-11 DIAGNOSIS — D64.9 ANEMIA, UNSPECIFIED TYPE: ICD-10-CM

## 2025-02-11 PROCEDURE — 1126F AMNT PAIN NOTED NONE PRSNT: CPT | Performed by: INTERNAL MEDICINE

## 2025-02-11 PROCEDURE — 1036F TOBACCO NON-USER: CPT | Performed by: INTERNAL MEDICINE

## 2025-02-11 PROCEDURE — 1123F ACP DISCUSS/DSCN MKR DOCD: CPT | Performed by: INTERNAL MEDICINE

## 2025-02-11 PROCEDURE — 99213 OFFICE O/P EST LOW 20 MIN: CPT | Performed by: INTERNAL MEDICINE

## 2025-02-11 PROCEDURE — G8427 DOCREV CUR MEDS BY ELIG CLIN: HCPCS | Performed by: INTERNAL MEDICINE

## 2025-02-11 PROCEDURE — 99212 OFFICE O/P EST SF 10 MIN: CPT

## 2025-02-11 PROCEDURE — 1159F MED LIST DOCD IN RCRD: CPT | Performed by: INTERNAL MEDICINE

## 2025-02-11 PROCEDURE — G8420 CALC BMI NORM PARAMETERS: HCPCS | Performed by: INTERNAL MEDICINE

## 2025-02-11 NOTE — PROGRESS NOTES
MA Rooming Questions  Patient: Luca Mckeon  MRN: [id number]    Date: 2/11/2025        1. Do you have any new issues?   yes - started Orgovyx- in November, states he has had intermittent nose bleeds since then, usually take awhile to stop, fatigue         2. Do you need any refills on medications?    no    3. Have you had any imaging done since your last visit?   yes - PET    4. Have you been hospitalized or seen in the emergency room since your last visit here?   no    5. Did the patient have a depression screening completed today? No    No data recorded     PHQ-9 Given to (if applicable):               PHQ-9 Score (if applicable):                     [] Positive     []  Negative              Does question #9 need addressed (if applicable)                     [] Yes    []  No               Kirstin Jacobson CMA      
1/15/2024. A second possible faint free kappa light chains band is also seen on VERNELL, that is not seen on SPEP.   7/11/2024 TSH 0.1L, Free T-4 wnl.  8/8/2024 , LFTs grossly stable for him.  WBC 7.3, Hg 8.5, MCV 90.7, plat 130. ALC 2.7  Monoclonal gammopathy, IgG lambda, 400 mg/dL.   SFKLC 88.55, SFLLC 131.17, K/L ratio wnl.  11/7/2024 creat 3.1, LFTs grossly unremarkable.  WBC 6.4, Hg 8.6, MCV 90.8, plat 138. Ferritin 133, iron 41L, TIBC 258L, sat 16, suggestive of chronic disease.   IgA 631H, IgG 1783H. B2M 10.3.   2/4/25 Creat 3.4. Calcium wnl. . LFTs wnl. WBC 6.2, Hg 8.1, MCV 90.7, plat 133.   IgA 651H, IgG 1811H. IgM wnl. B2M 11.7.   Polyclonal increase in the gamma region. Gamma 1.76 H   VERNELL pending.  Repeat labs prior to next OV.   Recommend monthly self exam for LAD.     He has intermittent nose bleed. Recommend to use humidifier at home and saline nasal spray.    4.  He has lung nodule. CT chest in August 2022 was reviewed.   I reviewed CT chest report with him.  2/2/2203 CT chest: Stable exam of the chest.  Unchanged 7 mm nodule in the lingula since the earliest available exam in 2021.  Recommend noncontrast CT follow-up in 2 years, 2/2025.    5.  He has seen cardiologist and complains of numbness and tingling to both fee since 8/2022. He is agreeable to see neurologist.    6. 3/3/2023 he had prostate biopsy.  He was diagnosed with likely low risk prostate cancer. Reportedly initial PSA ~ 10.   12/1/2023 PSMA PET by Dr TALBOT:  Findings are suspicious for residual or recurrent disease at the prostate bed, in this patient with history of prostate malignancy.  1/2024 TSH wnl.  PSA 1.4.   He started on Orgovyx by Dr TALBOT.  4/10/2024 total testosterone <3. PSA 0.4. TSH wnl.   He has fatigue and hot flashes. Orgovyx has been held since 4/2024. PSA every 3 months by Dr TALBOT.  10//17/2024 testosterone 400, PSA 8.6, Dr TALBOT started Orgovyx and will repeat PSA in 3 months.   1/17/25 testosterone <1. PSA

## 2025-02-12 LAB
MISCELLANEOUS LAB TEST RESULT: NORMAL
TEST NAME: NORMAL

## 2025-04-20 NOTE — PROGRESS NOTES
Patient Name:  Luca Mckeon  Patient :  1935  Patient MRN:  [id number]     Primary Oncologist: Kim Harvey MD  Referring Provider: Emanuel Aceves MD     Date of Service: 5/15/2025       Chief Complaint:    Chief Complaint   Patient presents with    Follow-up     He came in for follow up visit.     Patient Active Problem List:     Stage 3b chronic kidney disease      Persistent proteinuria     Hypertensive heart and renal disease     History of anemia due to CKD     HPI:   Luca Mckeon is a pleasant 89 year old male patient who was referred for evaluation of anemia. He reported that he has sickle cell trait.  2021 WBC 5.9, Hg 8.8, hematocrit 29.2, MCV 89.6, platelet 190.  Peripheral smear showed anisocytosis, polychromasia, ovalocytes.  Ferritin 394, iron 23, TIBC 197, folate 12.9, B12 more than 2000. TSH  0.06.  Free T4 normal at 1.31.  Creatinine 2.9, total protein 7.4, albumin 3.4.  SPEP showed a faint monoclonal spike noted measuring 500 mg/dL.  VERNELL showed faint monoclonal free lambda light chain proteins.    May 26, 2021 CT abdomen compared to study in 2020, 2019, 2008 showed :  No significant interval change in bilateral renal lesions, most notably a 4.3 cm complicated cystic lesion of the upper pole of the right kidney which is at least a Bosniak 2F lesion.  This dominant lesion is decreased in size since  and is probably benign.  These renal lesions could be further evaluated with renal mass protocol MRI at patient's current renal function if clinically indicated.    2022 DEXA: osteopenia. I recommend to take vitamin D and calcium.  I reviewed his records from epic.  He has been anemic at least since .  Hemoglobin in  was normal.  2020 he had episode of rectal bleed and received 3 units of packed red blood cell.  Reportedly he had upper and lower endoscopic study in the first part of  by Dr. Grullon.  4-5 polyps were removed

## 2025-04-22 ENCOUNTER — HOSPITAL ENCOUNTER (OUTPATIENT)
Age: 89
Discharge: HOME | End: 2025-04-22
Payer: MEDICARE

## 2025-04-22 LAB
ANION GAP SERPL CALCULATED.3IONS-SCNC: 12 MMOL/L (ref 9–17)
BUN SERPL-MCNC: 38 MG/DL (ref 7–20)
CALCIUM SERPL-MCNC: 9.8 MG/DL (ref 8.3–10.6)
CHLORIDE SERPL-SCNC: 107 MMOL/L (ref 99–110)
CO2 SERPL-SCNC: 24 MMOL/L (ref 21–32)
CREAT SERPL-MCNC: 3.1 MG/DL (ref 0.8–1.3)
GFR, ESTIMATED: 19 ML/MIN/1.73M2
GLUCOSE SERPL-MCNC: 115 MG/DL (ref 74–99)
POTASSIUM SERPL-SCNC: 4.5 MMOL/L (ref 3.5–5.1)
SODIUM SERPL-SCNC: 143 MMOL/L (ref 136–145)

## 2025-04-22 PROCEDURE — 80048 BASIC METABOLIC PNL TOTAL CA: CPT

## 2025-05-05 ENCOUNTER — HOSPITAL ENCOUNTER (OUTPATIENT)
Dept: INFUSION THERAPY | Age: 89
Discharge: HOME | End: 2025-05-05
Payer: MEDICARE

## 2025-05-05 DIAGNOSIS — D47.2 MGUS (MONOCLONAL GAMMOPATHY OF UNKNOWN SIGNIFICANCE): ICD-10-CM

## 2025-05-05 DIAGNOSIS — D64.9 ANEMIA, UNSPECIFIED TYPE: ICD-10-CM

## 2025-05-05 LAB
ALBUMIN SERPL-MCNC: 3.8 G/DL (ref 3.4–5)
ALBUMIN/GLOB SERPL: 1 {RATIO} (ref 1.1–2.2)
ALP SERPL-CCNC: 84 U/L (ref 40–129)
ALT SERPL-CCNC: 12 U/L (ref 10–40)
ANION GAP SERPL CALCULATED.3IONS-SCNC: 13 MMOL/L (ref 9–17)
AST SERPL-CCNC: 18 U/L (ref 15–37)
BASOPHILS # BLD: 0.02 K/UL
BASOPHILS NFR BLD: 0 % (ref 0–1)
BILIRUB SERPL-MCNC: 0.3 MG/DL (ref 0–1)
BUN SERPL-MCNC: 37 MG/DL (ref 7–20)
CALCIUM SERPL-MCNC: 9.4 MG/DL (ref 8.3–10.6)
CHLORIDE SERPL-SCNC: 106 MMOL/L (ref 99–110)
CO2 SERPL-SCNC: 24 MMOL/L (ref 21–32)
CREAT SERPL-MCNC: 3.1 MG/DL (ref 0.8–1.3)
EOSINOPHIL # BLD: 0.29 K/UL
EOSINOPHILS RELATIVE PERCENT: 4 % (ref 0–3)
ERYTHROCYTE [DISTWIDTH] IN BLOOD BY AUTOMATED COUNT: 14.9 % (ref 11.7–14.9)
ERYTHROCYTE [SEDIMENTATION RATE] IN BLOOD BY WESTERGREN METHOD: 8 MM/HR (ref 0–20)
FERRITIN SERPL-MCNC: 161 NG/ML (ref 30–400)
GFR, ESTIMATED: 19 ML/MIN/1.73M2
GLUCOSE SERPL-MCNC: 113 MG/DL (ref 74–99)
HCT VFR BLD AUTO: 25.4 % (ref 42–52)
HGB BLD-MCNC: 7.9 G/DL (ref 13.5–18)
IGA SERPL-MCNC: 655 MG/DL (ref 70–400)
IGG SERPL-MCNC: 1817 MG/DL (ref 700–1600)
IGM SERPL-MCNC: 160 MG/DL (ref 40–230)
IRON SATN MFR SERPL: 18 % (ref 15–50)
IRON SERPL-MCNC: 42 UG/DL (ref 59–158)
LDH SERPL-CCNC: 163 U/L (ref 100–190)
LYMPHOCYTES NFR BLD: 2.4 K/UL
LYMPHOCYTES RELATIVE PERCENT: 36 % (ref 24–44)
MCH RBC QN AUTO: 28.3 PG (ref 27–31)
MCHC RBC AUTO-ENTMCNC: 31.1 G/DL (ref 32–36)
MCV RBC AUTO: 91 FL (ref 78–100)
MONOCYTES NFR BLD: 0.73 K/UL
MONOCYTES NFR BLD: 11 % (ref 0–5)
NEUTROPHILS NFR BLD: 49 % (ref 36–66)
NEUTS SEG NFR BLD: 3.26 K/UL
PLATELET # BLD AUTO: 135 K/UL (ref 140–440)
PMV BLD AUTO: 9.4 FL (ref 7.5–11.1)
POTASSIUM SERPL-SCNC: 4 MMOL/L (ref 3.5–5.1)
PROT SERPL-MCNC: 7.5 G/DL (ref 6.4–8.2)
RBC # BLD AUTO: 2.79 M/UL (ref 4.6–6.2)
SODIUM SERPL-SCNC: 143 MMOL/L (ref 136–145)
TIBC SERPL-MCNC: 235 UG/DL (ref 260–445)
UNSATURATED IRON BINDING CAPACITY: 193 UG/DL (ref 110–370)
WBC OTHER # BLD: 6.7 K/UL (ref 4–10.5)

## 2025-05-05 PROCEDURE — 84155 ASSAY OF PROTEIN SERUM: CPT

## 2025-05-05 PROCEDURE — 83615 LACTATE (LD) (LDH) ENZYME: CPT

## 2025-05-05 PROCEDURE — 86334 IMMUNOFIX E-PHORESIS SERUM: CPT

## 2025-05-05 PROCEDURE — 84165 PROTEIN E-PHORESIS SERUM: CPT

## 2025-05-05 PROCEDURE — 82525 ASSAY OF COPPER: CPT

## 2025-05-05 PROCEDURE — 36415 COLL VENOUS BLD VENIPUNCTURE: CPT

## 2025-05-05 PROCEDURE — 85025 COMPLETE CBC W/AUTO DIFF WBC: CPT

## 2025-05-05 PROCEDURE — 83540 ASSAY OF IRON: CPT

## 2025-05-05 PROCEDURE — 82784 ASSAY IGA/IGD/IGG/IGM EACH: CPT

## 2025-05-05 PROCEDURE — 80053 COMPREHEN METABOLIC PANEL: CPT

## 2025-05-05 PROCEDURE — 84630 ASSAY OF ZINC: CPT

## 2025-05-05 PROCEDURE — 85652 RBC SED RATE AUTOMATED: CPT

## 2025-05-05 PROCEDURE — 83521 IG LIGHT CHAINS FREE EACH: CPT

## 2025-05-05 PROCEDURE — 83550 IRON BINDING TEST: CPT

## 2025-05-05 PROCEDURE — 82728 ASSAY OF FERRITIN: CPT

## 2025-05-06 LAB — ZINC SERPL-MCNC: 69.5 UG/DL (ref 60–120)

## 2025-05-07 LAB
COMMENT: ABNORMAL
KAPPA FREE LIGHT CHAIN: 107 MG/L (ref 2.37–20.73)
KAPPA/LAMBDA RATIO: 0.78 (ref 0.22–1.74)
LAMBDA FREE LIGHT CHAIN: 138 MG/L (ref 4.23–27.69)

## 2025-05-08 LAB
BETA-2 MICROGLOBULIN: 8.9 MG/L
COPPER SERPL-MCNC: 142.7 UG/DL (ref 70–140)
MISCELLANEOUS LAB TEST RESULT: ABNORMAL
MISCELLANEOUS LAB TEST RESULT: NORMAL
TEST NAME: ABNORMAL
TEST NAME: NORMAL

## 2025-05-15 ENCOUNTER — HOSPITAL ENCOUNTER (OUTPATIENT)
Dept: INFUSION THERAPY | Age: 89
Discharge: HOME | End: 2025-05-15
Payer: MEDICARE

## 2025-05-15 ENCOUNTER — CLINICAL DOCUMENTATION (OUTPATIENT)
Dept: INFUSION THERAPY | Age: 89
End: 2025-05-15

## 2025-05-15 ENCOUNTER — OFFICE VISIT (OUTPATIENT)
Dept: ONCOLOGY | Age: 89
End: 2025-05-15
Payer: MEDICARE

## 2025-05-15 VITALS
HEART RATE: 70 BPM | HEIGHT: 71 IN | WEIGHT: 173.4 LBS | SYSTOLIC BLOOD PRESSURE: 118 MMHG | RESPIRATION RATE: 16 BRPM | BODY MASS INDEX: 24.27 KG/M2 | DIASTOLIC BLOOD PRESSURE: 58 MMHG | TEMPERATURE: 98 F | OXYGEN SATURATION: 95 %

## 2025-05-15 DIAGNOSIS — G89.29 OTHER CHRONIC BACK PAIN: ICD-10-CM

## 2025-05-15 DIAGNOSIS — D64.9 ANEMIA, UNSPECIFIED TYPE: ICD-10-CM

## 2025-05-15 DIAGNOSIS — G89.29 OTHER CHRONIC BACK PAIN: Primary | ICD-10-CM

## 2025-05-15 DIAGNOSIS — R91.1 LUNG NODULE: ICD-10-CM

## 2025-05-15 DIAGNOSIS — D64.9 ACUTE ANEMIA: Primary | ICD-10-CM

## 2025-05-15 DIAGNOSIS — M54.89 OTHER CHRONIC BACK PAIN: Primary | ICD-10-CM

## 2025-05-15 DIAGNOSIS — D64.9 ANEMIA, UNSPECIFIED TYPE: Primary | ICD-10-CM

## 2025-05-15 DIAGNOSIS — M54.89 OTHER CHRONIC BACK PAIN: ICD-10-CM

## 2025-05-15 LAB
BASOPHILS # BLD: 0.02 K/UL
BASOPHILS NFR BLD: 0 % (ref 0–1)
EOSINOPHIL # BLD: 0.27 K/UL
EOSINOPHILS RELATIVE PERCENT: 4 % (ref 0–3)
ERYTHROCYTE [DISTWIDTH] IN BLOOD BY AUTOMATED COUNT: 14.9 % (ref 11.7–14.9)
HCT VFR BLD AUTO: 25.4 % (ref 42–52)
HGB BLD-MCNC: 7.9 G/DL (ref 13.5–18)
LYMPHOCYTES NFR BLD: 2.19 K/UL
LYMPHOCYTES RELATIVE PERCENT: 35 % (ref 24–44)
MCH RBC QN AUTO: 28.5 PG (ref 27–31)
MCHC RBC AUTO-ENTMCNC: 31.1 G/DL (ref 32–36)
MCV RBC AUTO: 91.7 FL (ref 78–100)
MONOCYTES NFR BLD: 0.73 K/UL
MONOCYTES NFR BLD: 12 % (ref 0–5)
NEUTROPHILS NFR BLD: 49 % (ref 36–66)
NEUTS SEG NFR BLD: 3.1 K/UL
PLATELET # BLD AUTO: 137 K/UL (ref 140–440)
PMV BLD AUTO: 10 FL (ref 7.5–11.1)
RBC # BLD AUTO: 2.77 M/UL (ref 4.6–6.2)
WBC OTHER # BLD: 6.3 K/UL (ref 4–10.5)

## 2025-05-15 PROCEDURE — 36415 COLL VENOUS BLD VENIPUNCTURE: CPT

## 2025-05-15 PROCEDURE — G8420 CALC BMI NORM PARAMETERS: HCPCS | Performed by: INTERNAL MEDICINE

## 2025-05-15 PROCEDURE — 1159F MED LIST DOCD IN RCRD: CPT | Performed by: INTERNAL MEDICINE

## 2025-05-15 PROCEDURE — 99212 OFFICE O/P EST SF 10 MIN: CPT

## 2025-05-15 PROCEDURE — G8427 DOCREV CUR MEDS BY ELIG CLIN: HCPCS | Performed by: INTERNAL MEDICINE

## 2025-05-15 PROCEDURE — 1036F TOBACCO NON-USER: CPT | Performed by: INTERNAL MEDICINE

## 2025-05-15 PROCEDURE — 99214 OFFICE O/P EST MOD 30 MIN: CPT | Performed by: INTERNAL MEDICINE

## 2025-05-15 PROCEDURE — 85025 COMPLETE CBC W/AUTO DIFF WBC: CPT

## 2025-05-15 PROCEDURE — 1123F ACP DISCUSS/DSCN MKR DOCD: CPT | Performed by: INTERNAL MEDICINE

## 2025-05-15 RX ORDER — SODIUM CHLORIDE 9 MG/ML
25 INJECTION, SOLUTION INTRAVENOUS PRN
OUTPATIENT
Start: 2025-05-15

## 2025-05-15 RX ORDER — FAMOTIDINE 10 MG/ML
20 INJECTION, SOLUTION INTRAVENOUS
OUTPATIENT
Start: 2025-05-15

## 2025-05-15 RX ORDER — DIPHENHYDRAMINE HCL 25 MG
25 TABLET ORAL ONCE
OUTPATIENT
Start: 2025-05-15 | End: 2025-05-15

## 2025-05-15 RX ORDER — EPINEPHRINE 1 MG/ML
0.3 INJECTION, SOLUTION, CONCENTRATE INTRAVENOUS PRN
OUTPATIENT
Start: 2025-05-15

## 2025-05-15 RX ORDER — ACETAMINOPHEN 325 MG/1
650 TABLET ORAL
OUTPATIENT
Start: 2025-05-15

## 2025-05-15 RX ORDER — GABAPENTIN 250 MG/5ML
SOLUTION ORAL
COMMUNITY
Start: 2025-05-09

## 2025-05-15 RX ORDER — SODIUM CHLORIDE 9 MG/ML
20 INJECTION, SOLUTION INTRAVENOUS CONTINUOUS
OUTPATIENT
Start: 2025-05-15

## 2025-05-15 RX ORDER — SODIUM CHLORIDE 0.9 % (FLUSH) 0.9 %
5-40 SYRINGE (ML) INJECTION PRN
OUTPATIENT
Start: 2025-05-15

## 2025-05-15 RX ORDER — ONDANSETRON 2 MG/ML
8 INJECTION INTRAMUSCULAR; INTRAVENOUS
OUTPATIENT
Start: 2025-05-15

## 2025-05-15 RX ORDER — ALBUTEROL SULFATE 90 UG/1
4 INHALANT RESPIRATORY (INHALATION) PRN
OUTPATIENT
Start: 2025-05-15

## 2025-05-15 RX ORDER — HYDROCORTISONE SODIUM SUCCINATE 100 MG/2ML
100 INJECTION INTRAMUSCULAR; INTRAVENOUS
OUTPATIENT
Start: 2025-05-15

## 2025-05-15 RX ORDER — ACETAMINOPHEN 325 MG/1
650 TABLET ORAL ONCE
OUTPATIENT
Start: 2025-05-15 | End: 2025-05-15

## 2025-05-15 RX ORDER — SODIUM CHLORIDE 9 MG/ML
INJECTION, SOLUTION INTRAVENOUS CONTINUOUS
OUTPATIENT
Start: 2025-05-15

## 2025-05-15 RX ORDER — TRAMADOL HYDROCHLORIDE 50 MG/1
50 TABLET ORAL EVERY 6 HOURS
COMMUNITY
Start: 2025-03-11

## 2025-05-15 RX ORDER — DIPHENHYDRAMINE HYDROCHLORIDE 50 MG/ML
50 INJECTION, SOLUTION INTRAMUSCULAR; INTRAVENOUS
OUTPATIENT
Start: 2025-05-15

## 2025-05-15 ASSESSMENT — PATIENT HEALTH QUESTIONNAIRE - PHQ9
SUM OF ALL RESPONSES TO PHQ QUESTIONS 1-9: 2
1. LITTLE INTEREST OR PLEASURE IN DOING THINGS: SEVERAL DAYS
SUM OF ALL RESPONSES TO PHQ QUESTIONS 1-9: 2
2. FEELING DOWN, DEPRESSED OR HOPELESS: SEVERAL DAYS

## 2025-05-15 NOTE — PROGRESS NOTES
MA Rooming Questions  Patient: Lcua Mckeon  MRN: [id number]    Date: 5/15/2025        1. Do you have any new issues?   yes -tired, weak         2. Do you need any refills on medications?    no    3. Have you had any imaging done since your last visit?   no    4. Have you been hospitalized or seen in the emergency room since your last visit here?   no    5. Did the patient have a depression screening completed today? Yes    No data recorded     PHQ-9 Given to (if applicable):               PHQ-9 Score (if applicable):                     [] Positive     []  Negative              Does question #9 need addressed (if applicable)                     [] Yes    []  No               Kirstin Jacobson CMA

## 2025-05-15 NOTE — PROGRESS NOTES
Hgb 7.9; per Dr Alvarez 1 unit of PRBC, called OP Infusion Dept, spoke to Paz, patient scheduled for 5/19 @ 0830. Pt signed consent. Pt to be T&S at OP Infusion day of appt. [facility] for pt advising of the above leaving my direct number requesting a call back to confirm receipt. I will attempt to reach pt again, though.

## 2025-05-15 NOTE — PROGRESS NOTES
Patient scheduled for blood transfusion at [facility] on 05/19/2025 @ 0830. Order placed for 1 unit PRBC per physician order. Blood therapy plan added.

## 2025-05-16 ENCOUNTER — TELEPHONE (OUTPATIENT)
Dept: CT IMAGING | Age: 89
End: 2025-05-16

## 2025-05-16 NOTE — TELEPHONE ENCOUNTER
5/16/25 - Spoke to pt and scheduled the 6/11/25 CT chest wo contrast at [facility] arrive at 3:00 pm. No prep.

## 2025-05-19 ENCOUNTER — HOSPITAL ENCOUNTER (OUTPATIENT)
Dept: INFUSION THERAPY | Age: 89
Setting detail: INFUSION SERIES
Discharge: HOME | End: 2025-05-19
Payer: MEDICARE

## 2025-05-19 VITALS
DIASTOLIC BLOOD PRESSURE: 73 MMHG | TEMPERATURE: 97.2 F | OXYGEN SATURATION: 99 % | RESPIRATION RATE: 16 BRPM | HEART RATE: 67 BPM | SYSTOLIC BLOOD PRESSURE: 143 MMHG

## 2025-05-19 DIAGNOSIS — D64.9 ACUTE ANEMIA: Primary | ICD-10-CM

## 2025-05-19 PROCEDURE — P9016 RBC LEUKOCYTES REDUCED: HCPCS

## 2025-05-19 PROCEDURE — 86900 BLOOD TYPING SEROLOGIC ABO: CPT

## 2025-05-19 PROCEDURE — 86920 COMPATIBILITY TEST SPIN: CPT

## 2025-05-19 PROCEDURE — 86901 BLOOD TYPING SEROLOGIC RH(D): CPT

## 2025-05-19 PROCEDURE — 2500000003 HC RX 250 WO HCPCS: Performed by: INTERNAL MEDICINE

## 2025-05-19 PROCEDURE — 36430 TRANSFUSION BLD/BLD COMPNT: CPT

## 2025-05-19 PROCEDURE — 2580000003 HC RX 258: Performed by: INTERNAL MEDICINE

## 2025-05-19 PROCEDURE — 86850 RBC ANTIBODY SCREEN: CPT

## 2025-05-19 PROCEDURE — 36415 COLL VENOUS BLD VENIPUNCTURE: CPT

## 2025-05-19 RX ORDER — DIPHENHYDRAMINE HCL 25 MG
25 TABLET ORAL ONCE
Status: CANCELLED | OUTPATIENT
Start: 2025-05-19 | End: 2025-05-19

## 2025-05-19 RX ORDER — ONDANSETRON 2 MG/ML
8 INJECTION INTRAMUSCULAR; INTRAVENOUS
Status: CANCELLED | OUTPATIENT
Start: 2025-05-19

## 2025-05-19 RX ORDER — SODIUM CHLORIDE 0.9 % (FLUSH) 0.9 %
5-40 SYRINGE (ML) INJECTION PRN
Status: DISCONTINUED | OUTPATIENT
Start: 2025-05-19 | End: 2025-05-19

## 2025-05-19 RX ORDER — DIPHENHYDRAMINE HYDROCHLORIDE 50 MG/ML
50 INJECTION, SOLUTION INTRAMUSCULAR; INTRAVENOUS
Status: CANCELLED | OUTPATIENT
Start: 2025-05-19

## 2025-05-19 RX ORDER — SODIUM CHLORIDE 9 MG/ML
25 INJECTION, SOLUTION INTRAVENOUS PRN
Status: CANCELLED | OUTPATIENT
Start: 2025-05-19

## 2025-05-19 RX ORDER — SODIUM CHLORIDE 9 MG/ML
20 INJECTION, SOLUTION INTRAVENOUS CONTINUOUS
Status: DISCONTINUED | OUTPATIENT
Start: 2025-05-19 | End: 2025-05-19

## 2025-05-19 RX ORDER — FAMOTIDINE 10 MG/ML
20 INJECTION, SOLUTION INTRAVENOUS
Status: CANCELLED | OUTPATIENT
Start: 2025-05-19

## 2025-05-19 RX ORDER — ACETAMINOPHEN 325 MG/1
650 TABLET ORAL
Status: CANCELLED | OUTPATIENT
Start: 2025-05-19

## 2025-05-19 RX ORDER — ACETAMINOPHEN 325 MG/1
650 TABLET ORAL ONCE
Status: CANCELLED | OUTPATIENT
Start: 2025-05-19 | End: 2025-05-19

## 2025-05-19 RX ORDER — SODIUM CHLORIDE 0.9 % (FLUSH) 0.9 %
5-40 SYRINGE (ML) INJECTION PRN
Status: CANCELLED | OUTPATIENT
Start: 2025-05-19

## 2025-05-19 RX ORDER — SODIUM CHLORIDE 9 MG/ML
25 INJECTION, SOLUTION INTRAVENOUS PRN
Status: DISCONTINUED | OUTPATIENT
Start: 2025-05-19 | End: 2025-05-19

## 2025-05-19 RX ORDER — ACETAMINOPHEN 325 MG/1
650 TABLET ORAL ONCE
Status: DISCONTINUED | OUTPATIENT
Start: 2025-05-19 | End: 2025-05-19

## 2025-05-19 RX ORDER — SODIUM CHLORIDE 9 MG/ML
INJECTION, SOLUTION INTRAVENOUS CONTINUOUS
Status: CANCELLED | OUTPATIENT
Start: 2025-05-19

## 2025-05-19 RX ORDER — HYDROCORTISONE SODIUM SUCCINATE 100 MG/2ML
100 INJECTION INTRAMUSCULAR; INTRAVENOUS
Status: CANCELLED | OUTPATIENT
Start: 2025-05-19

## 2025-05-19 RX ORDER — EPINEPHRINE 1 MG/ML
0.3 INJECTION, SOLUTION INTRAMUSCULAR; SUBCUTANEOUS PRN
Status: CANCELLED | OUTPATIENT
Start: 2025-05-19

## 2025-05-19 RX ORDER — DIPHENHYDRAMINE HCL 25 MG
25 TABLET ORAL ONCE
Status: DISCONTINUED | OUTPATIENT
Start: 2025-05-19 | End: 2025-05-19

## 2025-05-19 RX ORDER — ALBUTEROL SULFATE 90 UG/1
4 INHALANT RESPIRATORY (INHALATION) PRN
Status: CANCELLED | OUTPATIENT
Start: 2025-05-19

## 2025-05-19 RX ORDER — SODIUM CHLORIDE 9 MG/ML
20 INJECTION, SOLUTION INTRAVENOUS CONTINUOUS
Status: CANCELLED | OUTPATIENT
Start: 2025-05-19

## 2025-05-19 RX ADMIN — SODIUM CHLORIDE, PRESERVATIVE FREE 10 ML: 5 INJECTION INTRAVENOUS at 11:53

## 2025-05-19 RX ADMIN — SODIUM CHLORIDE, PRESERVATIVE FREE 10 ML: 5 INJECTION INTRAVENOUS at 10:04

## 2025-05-19 RX ADMIN — SODIUM CHLORIDE 20 ML/HR: 0.9 INJECTION, SOLUTION INTRAVENOUS at 10:04

## 2025-05-19 NOTE — PROGRESS NOTES
Prior to discharge, the After Visit Summary Discharge Instructions were reviewed with the patient.  He was offered a printed version of the AVS, but declined the offer. Recurrent appointment, pt tolerated infusion well. Pt discharged home. Pt to exit via steady gait.    Orders Placed This Encounter   Medications    0.9 % sodium chloride infusion    sodium chloride flush 0.9 % injection 5-40 mL    0.9 % sodium chloride infusion    acetaminophen (TYLENOL) tablet 650 mg    diphenhydrAMINE (BENADRYL) tablet 25 mg    methylPREDNISolone sodium succ (SOLU-MEDROL) 20 mg in sterile water 0.5 mL injection

## 2025-05-20 ENCOUNTER — CLINICAL DOCUMENTATION (OUTPATIENT)
Dept: ONCOLOGY | Age: 89
End: 2025-05-20

## 2025-05-20 LAB
ABO/RH: NORMAL
ANTIBODY SCREEN: NEGATIVE
BLOOD BANK BLOOD PRODUCT EXPIRATION DATE: NORMAL
BLOOD BANK DISPENSE STATUS: NORMAL
BLOOD BANK ISBT PRODUCT BLOOD TYPE: 2800
BLOOD BANK PRODUCT CODE: NORMAL
BLOOD BANK SAMPLE EXPIRATION: NORMAL
BLOOD BANK UNIT TYPE AND RH: NORMAL
BPU ID: NORMAL
COMPONENT: NORMAL
CROSSMATCH RESULT: NORMAL
TRANSFUSION STATUS: NORMAL
UNIT DIVISION: 0
UNIT ISSUE DATE/TIME: NORMAL

## 2025-05-20 NOTE — PROGRESS NOTES
I called and lvm for patient BM BX 5/30/25 08:00 Am arrival for 09:30 BM bx [facility].

## 2025-05-27 ENCOUNTER — HOSPITAL ENCOUNTER (OUTPATIENT)
Age: 89
Discharge: HOME | End: 2025-05-27
Payer: MEDICARE

## 2025-05-27 LAB — 25(OH)D3 SERPL-MCNC: 83 NG/ML (ref 30–150)

## 2025-05-27 PROCEDURE — 84403 ASSAY OF TOTAL TESTOSTERONE: CPT

## 2025-05-27 PROCEDURE — 82306 VITAMIN D 25 HYDROXY: CPT

## 2025-05-27 PROCEDURE — 84154 ASSAY OF PSA FREE: CPT

## 2025-05-27 PROCEDURE — 84402 ASSAY OF FREE TESTOSTERONE: CPT

## 2025-05-27 PROCEDURE — 84270 ASSAY OF SEX HORMONE GLOBUL: CPT

## 2025-05-27 PROCEDURE — 84153 ASSAY OF PSA TOTAL: CPT

## 2025-05-27 NOTE — PROGRESS NOTES
5/27/25 -  with my call-back #, times and instructions:    IR Procedure at [facility]:  5/30/25 @ 0930, arrival 0800    NPO at Midnight      Follow your directions as prescribed by the doctor for your procedure and medications.  3.   Consult your provider as to when to stop blood thinner - no blood thinner listed  4.   Do not take any pain medication within 6 hours of your procedure  5.   Do not drink any alcoholic beverages or use any street drugs 24 hours before procedure.  6.   Please wear simple, loose fitting clothing to the hospital.  Do not bring valuables (money,             credit cards, checkbooks, etc.)     7.   If you  have a Living Will and Durable Power of  for Healthcare, please bring in a copy.  8.   Please bring picture ID,  insurance card, paperwork from the doctors office            (H & P, Consent,  & card for implantable devices).  9.   Report to the information desk on the ground floor.  10. Take a shower the night before or morning of your procedure, do not apply any lotion, oil or powder.  11. You will need a responsible adult

## 2025-05-30 ENCOUNTER — HOSPITAL ENCOUNTER (OUTPATIENT)
Dept: INTERVENTIONAL RADIOLOGY/VASCULAR | Age: 89
Discharge: HOME | End: 2025-05-30
Payer: MEDICARE

## 2025-05-30 VITALS
DIASTOLIC BLOOD PRESSURE: 69 MMHG | HEART RATE: 73 BPM | OXYGEN SATURATION: 98 % | HEIGHT: 71 IN | RESPIRATION RATE: 16 BRPM | SYSTOLIC BLOOD PRESSURE: 159 MMHG | WEIGHT: 150 LBS | BODY MASS INDEX: 21 KG/M2

## 2025-05-30 DIAGNOSIS — D64.9 RELATIVE ANEMIA: ICD-10-CM

## 2025-05-30 DIAGNOSIS — G89.29 CHRONIC BACK PAIN GREATER THAN 3 MONTHS DURATION: ICD-10-CM

## 2025-05-30 DIAGNOSIS — M54.9 CHRONIC BACK PAIN GREATER THAN 3 MONTHS DURATION: ICD-10-CM

## 2025-05-30 LAB
ERYTHROCYTE [DISTWIDTH] IN BLOOD BY AUTOMATED COUNT: 14.9 % (ref 11.7–14.9)
HCT VFR BLD AUTO: 27 % (ref 42–52)
HGB BLD-MCNC: 8.4 G/DL (ref 13.5–18)
INR PPP: 1.1
MCH RBC QN AUTO: 28.2 PG (ref 27–31)
MCHC RBC AUTO-ENTMCNC: 31.1 G/DL (ref 32–36)
MCV RBC AUTO: 90.6 FL (ref 78–100)
PARTIAL THROMBOPLASTIN TIME: 29.8 SEC (ref 25.1–37.1)
PLATELET # BLD AUTO: 121 K/UL (ref 140–440)
PMV BLD AUTO: 9.7 FL (ref 7.5–11.1)
PROSTATE SPECIFIC ANTIGEN FREE: 0.3 NG/ML
PROSTATE SPECIFIC ANTIGEN PERCENT FREE: 8 %
PROSTATE SPECIFIC ANTIGEN: 4 NG/ML (ref 0–4)
PROTHROMBIN TIME: 14.6 SEC (ref 11.7–14.5)
RBC # BLD AUTO: 2.98 M/UL (ref 4.6–6.2)
RETICS # AUTO: 0.03 M/UL
RETICS/RBC NFR AUTO: 1.1 % (ref 0.2–2)
SEX HORMONE BINDING GLOBULIN: 61 NMOL/L (ref 19–76)
TESTOSTERONE FREE PERCENT: 1.2 % (ref 1.6–2.9)
TESTOSTERONE FREE-MALE: 32 PG/ML (ref 47–244)
TESTOSTERONE TOTAL-MALE: 264 NG/DL (ref 300–720)
WBC OTHER # BLD: 6.8 K/UL (ref 4–10.5)

## 2025-05-30 PROCEDURE — 85730 THROMBOPLASTIN TIME PARTIAL: CPT

## 2025-05-30 PROCEDURE — 85610 PROTHROMBIN TIME: CPT

## 2025-05-30 PROCEDURE — 6360000002 HC RX W HCPCS: Performed by: RADIOLOGY

## 2025-05-30 PROCEDURE — 85027 COMPLETE CBC AUTOMATED: CPT

## 2025-05-30 PROCEDURE — 85045 AUTOMATED RETICULOCYTE COUNT: CPT

## 2025-05-30 PROCEDURE — 38222 DX BONE MARROW BX & ASPIR: CPT

## 2025-05-30 PROCEDURE — C1830 POWER BONE MARROW BX NEEDLE: HCPCS

## 2025-05-30 PROCEDURE — 77002 NEEDLE LOCALIZATION BY XRAY: CPT

## 2025-05-30 RX ORDER — LIDOCAINE HYDROCHLORIDE 10 MG/ML
INJECTION, SOLUTION EPIDURAL; INFILTRATION; INTRACAUDAL; PERINEURAL PRN
Status: COMPLETED | OUTPATIENT
Start: 2025-05-30 | End: 2025-05-30

## 2025-05-30 RX ADMIN — LIDOCAINE HYDROCHLORIDE 10 ML: 10 INJECTION, SOLUTION EPIDURAL; INFILTRATION; INTRACAUDAL; PERINEURAL at 10:16

## 2025-05-30 NOTE — CONSULTS
Consult Interventional Radiology    Date:2025  Name:Luca Mckeon   :1935   MR#:[id number]    SEX:male     Planned procedure:  bone marrow biopsy  Indication: anemia    H&P Status: H&P was reviewed, the patient was examined and no change has occurred in patient's condition since H&P was completed.    Past Medical History:  Past Medical History:   Diagnosis Date    Arthritis     CAD (coronary artery disease)     GERD (gastroesophageal reflux disease)     Hyperlipidemia     Hypertension     Kidney cysts     Right kidney    Prostate cancer (HCC) 2023    Thyroid disease     Unspecified cerebral artery occlusion with cerebral infarction         Past Surgical History:  Past Surgical History:   Procedure Laterality Date    CATARACT REMOVAL      COLONOSCOPY  13    divertics    COLONOSCOPY N/A 2020    COLONOSCOPY DIAGNOSTIC performed by Smita Clark MD at [facility] ENDOSCOPY    CORONARY ANGIOPLASTY WITH STENT PLACEMENT      ENDOSCOPY, COLON, DIAGNOSTIC  13    egd: normal    FRACTURE SURGERY      PROSTATE BIOPSY      UPPER GASTROINTESTINAL ENDOSCOPY N/A 2020    EGD DIAGNOSTIC ONLY performed by Smita Clark MD at [facility] ENDOSCOPY       Social History:  Social History     Socioeconomic History    Marital status:      Spouse name: Not on file    Number of children: Not on file    Years of education: Not on file    Highest education level: Not on file   Occupational History    Not on file   Tobacco Use    Smoking status: Never    Smokeless tobacco: Never   Substance and Sexual Activity    Alcohol use: No    Drug use: No    Sexual activity: Not on file   Other Topics Concern    Not on file   Social History Narrative    Not on file     Social Drivers of Health     Financial Resource Strain: Not on file   Food Insecurity: Not on file   Transportation Needs: Not on file   Physical Activity: Not on file   Stress: Not on file   Social Connections: Not on file   Intimate Partner Violence: Not on

## 2025-05-30 NOTE — BRIEF OP NOTE
Department of Interventional Radiology Post-Procedure Note      Date: 5/30/2025     Procedure Performed:  fluoro guided bone/marrow biopsy    Pre-procedure Diagnosis:  anemia    Post-procedure Diagnosis:  Same    Estimated blood loss:  Minimal    Preliminary Findings:  biopsy needle projects over posterior left ilium    Complications:  none    Full Report to Follow.    Electronically signed by Edilberto Rosales DO on 5/30/2025 at 10:21 AM

## 2025-05-30 NOTE — PROGRESS NOTES
Returned from procedure in IR with Dr. Rosales. Tolerated well. Vitals are stable. Posterior sacral dressing is clean, dry, intact. Pt. denies pain at this time. Drink and snack provided.

## 2025-05-30 NOTE — PROGRESS NOTES
TRANSFER - OUT REPORT:    Verbal report given to Sylwia RN and Candelaria RN on Luca Mckeon being transferred to IR holding for routine post-op       Report consisted of patient's Situation, Background, Assessment and   Recommendations(SBAR).     Information from the following report(s) Nurse Handoff Report was reviewed with the receiving nurse.    Opportunity for questions and clarification was provided.      Patient transported with:   Registered Nurse    Successful BMB performed

## 2025-06-10 ENCOUNTER — TRANSCRIBE ORDERS (OUTPATIENT)
Dept: ADMINISTRATIVE | Age: 89
End: 2025-06-10

## 2025-06-10 DIAGNOSIS — C61 MALIGNANT NEOPLASM OF PROSTATE (HCC): Primary | ICD-10-CM

## 2025-06-10 LAB — SURGICAL PATHOLOGY REPORT: NORMAL

## 2025-06-12 LAB — BONE MARROW REPORT: NORMAL

## 2025-06-17 ENCOUNTER — OFFICE VISIT (OUTPATIENT)
Dept: ONCOLOGY | Age: 89
End: 2025-06-17
Payer: MEDICARE

## 2025-06-17 ENCOUNTER — HOSPITAL ENCOUNTER (OUTPATIENT)
Dept: INFUSION THERAPY | Age: 89
Discharge: HOME | End: 2025-06-17
Payer: MEDICARE

## 2025-06-17 VITALS
HEIGHT: 71 IN | TEMPERATURE: 97.5 F | OXYGEN SATURATION: 98 % | HEART RATE: 66 BPM | BODY MASS INDEX: 24.44 KG/M2 | SYSTOLIC BLOOD PRESSURE: 123 MMHG | WEIGHT: 174.6 LBS | DIASTOLIC BLOOD PRESSURE: 57 MMHG

## 2025-06-17 DIAGNOSIS — D64.9 ANEMIA, UNSPECIFIED TYPE: Primary | ICD-10-CM

## 2025-06-17 DIAGNOSIS — D64.9 ANEMIA, UNSPECIFIED TYPE: ICD-10-CM

## 2025-06-17 LAB
BASOPHILS # BLD: 0.03 K/UL
BASOPHILS NFR BLD: 1 % (ref 0–1)
EOSINOPHIL # BLD: 0.29 K/UL
EOSINOPHILS RELATIVE PERCENT: 5 % (ref 0–3)
ERYTHROCYTE [DISTWIDTH] IN BLOOD BY AUTOMATED COUNT: 15.2 % (ref 11.7–14.9)
HCT VFR BLD AUTO: 26 % (ref 42–52)
HGB BLD-MCNC: 8.2 G/DL (ref 13.5–18)
LYMPHOCYTES NFR BLD: 1.65 K/UL
LYMPHOCYTES RELATIVE PERCENT: 29 % (ref 24–44)
MCH RBC QN AUTO: 28.5 PG (ref 27–31)
MCHC RBC AUTO-ENTMCNC: 31.5 G/DL (ref 32–36)
MCV RBC AUTO: 90.3 FL (ref 78–100)
MONOCYTES NFR BLD: 0.76 K/UL
MONOCYTES NFR BLD: 13 % (ref 0–5)
NEUTROPHILS NFR BLD: 53 % (ref 36–66)
NEUTS SEG NFR BLD: 3.02 K/UL
PLATELET # BLD AUTO: 108 K/UL (ref 140–440)
PMV BLD AUTO: 8.9 FL (ref 7.5–11.1)
RBC # BLD AUTO: 2.88 M/UL (ref 4.6–6.2)
WBC OTHER # BLD: 5.8 K/UL (ref 4–10.5)

## 2025-06-17 PROCEDURE — G8427 DOCREV CUR MEDS BY ELIG CLIN: HCPCS | Performed by: INTERNAL MEDICINE

## 2025-06-17 PROCEDURE — 99213 OFFICE O/P EST LOW 20 MIN: CPT | Performed by: INTERNAL MEDICINE

## 2025-06-17 PROCEDURE — 99212 OFFICE O/P EST SF 10 MIN: CPT

## 2025-06-17 PROCEDURE — 82668 ASSAY OF ERYTHROPOIETIN: CPT

## 2025-06-17 PROCEDURE — 1125F AMNT PAIN NOTED PAIN PRSNT: CPT | Performed by: INTERNAL MEDICINE

## 2025-06-17 PROCEDURE — 85025 COMPLETE CBC W/AUTO DIFF WBC: CPT

## 2025-06-17 PROCEDURE — 36415 COLL VENOUS BLD VENIPUNCTURE: CPT

## 2025-06-17 PROCEDURE — G8420 CALC BMI NORM PARAMETERS: HCPCS | Performed by: INTERNAL MEDICINE

## 2025-06-17 PROCEDURE — 1036F TOBACCO NON-USER: CPT | Performed by: INTERNAL MEDICINE

## 2025-06-17 PROCEDURE — 1159F MED LIST DOCD IN RCRD: CPT | Performed by: INTERNAL MEDICINE

## 2025-06-17 PROCEDURE — 1123F ACP DISCUSS/DSCN MKR DOCD: CPT | Performed by: INTERNAL MEDICINE

## 2025-06-17 NOTE — PROGRESS NOTES
MA/LPN Rooming Questions  Patient: Luca Mckeon  MRN: [id number]    Date: 6/17/2025        1. Do you have any new issues?   no         2. Do you need any refills on medications?    no    3. Have you had any imaging done since your last visit?   no    4. Have you been hospitalized or seen in the emergency room since your last visit here?   yes - Bone Marrow 5/30    5. Did the patient have a depression screening completed today? No    No data recorded     PHQ-9 Given to (if applicable):               PHQ-9 Score (if applicable):                     [] Positive     []  Negative              Does question #9 need addressed (if applicable)                     [] Yes    []  No               Tabatha Painter MA      
CT chest: Stable exam of the chest.  Unchanged 7 mm nodule in the lingula since the earliest available exam in .  Recommend noncontrast CT follow-up in 2 years.  He has seen cardiologist and complains of numbness and tingling to both fee since 2022. He is agreeable to see neurologist.  3/3/2023 he had prostate biopsy  3/2023 creat 3. PTH 31.  He was diagnosed with likely low risk prostate cancer and started on ADT by urologist. Reportedly initial PSA ~ 10.    He started injection to right eye for macular degeneration every 7 weeks.  2023 Creat 3.1. B-12 >2000. . LFTs grossly unremarkable. WBC 4.7, Hg 8. MCV 91.3, plt 155. Ferritin 212, TIBC 219, Iron 48%, folate 13.2.  SFKLC 108. SFLLC 128. K/L ratio 0.85.  INTERPRETATION - Monoclonal gammopathy, IgG lambda, 300 mg/dL, decreased from 500 mg/dL on 2023  Acute hep panel non reactive.  Older brother  recently due to advanced dementia.  Daughter lives 15 miles away. He is independent.  2023 PSMA PET by Dr TALBOT:  Findings are suspicious for residual or recurrent disease at the prostate bed, in this patient with history of prostate malignancy.  2024 TSH wnl.  PSA 1.4.   He started on Orgovyx by Dr TALBOT.  2024 TSH wnl.   1/15/2024 Creat 2.9, . LFTs wnl. WBC 7.5, Hg 8.1, plat 135, ANC 3.8.  SFKLC 141.25. SFLLC 139.45, K/L ratio wnl.  INTERPRETATION - Monoclonal gammopathy, 300 mg/dL, identified as IgG lambda on concurrent VERNELL, which has remained stable since 23.   3/29/2024 creat 3.   4/10/2024 total testosterone <3. PSA 0.4. TSH wnl.   He has fatigue and hot flashes. Orgovyx has been held since 2024. PSA every 3 months by Dr TALBOT.    2024 DEXA: osteopenia.  Recommend Vit D 2000 IU + ca 1200 mg BID and weight bearing exercise.    2024 creat 2.5. . LFTs stable.  WBC 6, Hg 7.8, plat 131. Ferritin 249, iron 49, TIBC 264. Sat 19.   IgA 663H, IgG 1987H, IgM 157. B2M 8.   SFKLC 111.28, SFLLC 140.97, K/L ratio 0.79.   An IgG

## 2025-06-19 LAB — EPO SERPL-ACNC: 19 MU/ML (ref 4–27)

## 2025-06-22 NOTE — PROGRESS NOTES
Patient Name:  Luac Mckeon  Patient :  1935  Patient MRN:  [id number]     Primary Oncologist: Kim Harvey MD  Referring Provider: Emanuel Aceves MD     Date of Service: 2025       Chief Complaint:    Chief Complaint   Patient presents with    Follow-up     He came in for follow up visit.     Patient Active Problem List:     Stage 3b chronic kidney disease      Persistent proteinuria     Hypertensive heart and renal disease     History of anemia due to CKD     HPI:   Luca Mckeon is a pleasant 89 year old male patient who was referred for evaluation of anemia. He reported that he has sickle cell trait.  2021 WBC 5.9, Hg 8.8, hematocrit 29.2, MCV 89.6, platelet 190.  Peripheral smear showed anisocytosis, polychromasia, ovalocytes.  Ferritin 394, iron 23, TIBC 197, folate 12.9, B12 more than 2000. TSH  0.06.  Free T4 normal at 1.31.  Creatinine 2.9, total protein 7.4, albumin 3.4.  SPEP showed a faint monoclonal spike noted measuring 500 mg/dL.  VERNELL showed faint monoclonal free lambda light chain proteins.    May 26, 2021 CT abdomen compared to 2020, 2019, 2008 showed:  No significant interval change in bilateral renal lesions, most notably a 4.3 cm complicated cystic lesion of the upper pole of the right kidney which is at least a Bosniak 2F lesion.  This dominant lesion is decreased in size since  and is probably benign.  These renal lesions could be further evaluated with renal mass protocol MRI at patient's current renal function if clinically indicated.    2022 DEXA: osteopenia. I recommend to take vitamin D and calcium.  I reviewed his records from epic.  He has been anemic at least since .  Hemoglobin in  was normal.  2020 he had episode of rectal bleed and received 3 units of packed red blood cell.  Reportedly he had upper and lower endoscopic study in the first part of  by Dr. Grullon.  4-5 polyps were removed from the

## 2025-06-23 ENCOUNTER — HOSPITAL ENCOUNTER (OUTPATIENT)
Dept: RADIATION ONCOLOGY | Age: 89
Discharge: HOME | End: 2025-06-23
Payer: MEDICARE

## 2025-06-23 VITALS
OXYGEN SATURATION: 98 % | DIASTOLIC BLOOD PRESSURE: 64 MMHG | BODY MASS INDEX: 24.33 KG/M2 | HEIGHT: 71 IN | SYSTOLIC BLOOD PRESSURE: 130 MMHG | WEIGHT: 173.8 LBS | RESPIRATION RATE: 16 BRPM | TEMPERATURE: 98 F | HEART RATE: 68 BPM

## 2025-06-23 PROCEDURE — 99202 OFFICE O/P NEW SF 15 MIN: CPT | Performed by: RADIOLOGY

## 2025-06-23 PROCEDURE — 99205 OFFICE O/P NEW HI 60 MIN: CPT | Performed by: RADIOLOGY

## 2025-06-23 NOTE — PROGRESS NOTES
Luca Mckeon  6/23/2025    CONSULT    Pt here to discuss radiation treatment options.    Vitals:    06/23/25 1329   BP: 130/64   Pulse: 68   Resp: 16   Temp: 98 °F (36.7 °C)   SpO2: 98%        Oxygen Therapy  SpO2: 98 %  Pulse Oximeter Device Mode: Continuous  Pulse Oximeter Device Location: Finger  O2 Device: None (Room air)    PAIN ASSESSMENT  Pain Assessment  Pain Assessment: None - Denies Pain  Denies Need for Intervention    NUTRITION SCREENING  Body mass index is 24.25 kg/m².    1. Any recent Weight Loss? no  If yes, how many LBS -  0    How many weeks, Months - 0     Are you following a special diet? no     3.  Any difficulty swallowing or chewing food? no     Are you eating or drinking any nutritional supplements? no    5.  Any dietary concerns? Constipation            6. Swallowing Difficulty  normal oral swallow    FALL RISK:    Ambulatory aid device (15 pts)  Not currently a fall risk    MEDIPORT: no    PACEMAKER/ICD: no    IMPLANTS: no    PREVIOUS XRT: no        Past Medical History:   Diagnosis Date    Arthritis     CAD (coronary artery disease)     GERD (gastroesophageal reflux disease)     Hyperlipidemia     Hypertension     Kidney cysts     Right kidney    Prostate cancer (HCC) 03/21/2023    Thyroid disease     Unspecified cerebral artery occlusion with cerebral infarction        Past Surgical History:   Procedure Laterality Date    CATARACT REMOVAL  2021    COLONOSCOPY  8/12/13    divertics    COLONOSCOPY N/A 2/8/2020    COLONOSCOPY DIAGNOSTIC performed by Smita Clark MD at [facility] ENDOSCOPY    CORONARY ANGIOPLASTY WITH STENT PLACEMENT      ENDOSCOPY, COLON, DIAGNOSTIC  8/12/13    egd: normal    FRACTURE SURGERY      IR BIOPSY BONE MARROW  5/30/2025    IR BIOPSY BONE MARROW 5/30/2025 [facility] SPECIAL PROCEDURES    PROSTATE BIOPSY      UPPER GASTROINTESTINAL ENDOSCOPY N/A 2/6/2020    EGD DIAGNOSTIC ONLY performed by Smita Clark MD at [facility] ENDOSCOPY       Allergies   Allergen Reactions    Pcn [Penicillins]

## 2025-06-23 NOTE — CONSULTS
night sweats.  The patient's weight and appetite have been stable.  Eyes:  The patient denies any recent visual changes, diplopia, or cataracts  ENMT:  The patient denies any ear pain, hearing changes, or nosebleeds  Respiratory:  The patient denies any SOB, hemoptysis, or green sputum production  Cardiovascular:  The patient denies any chest pain, leg edema, or fainting spells  GI:  Patient denies nausea, vomiting, diarrhea, melena, or hematochezia  : Patient denies dysuria, hematuria, or urinary incontinence.  Integumentary: patient denies skin rashes, pruritis, or arm edema  Endocrine:  Patient denies any diabetic or thyroid problems  Neurologic: Patient denies headaches, focal weakness, or disorientation  Psychiatric: The patient denies any depression, anxiety, or rapid mood swings.    PHYSICAL EXAMINATION:   VITAL SIGNS: /64   Pulse 68   Temp 98 °F (36.7 °C) (Infrared)   Resp 16   Ht 1.803 m (5' 10.98\")   Wt 78.8 kg (173 lb 12.8 oz)   SpO2 98%   BMI 24.25 kg/m²   ECOG PERFORMANCE STATUS: 1  PAIN RATIN    GENERAL: Pleasant well-developed adult in no acute distress.  Alert and oriented ×3  SKIN: Warm and dry, without jaundice, ecchymoses, or petechiae.  HEENT: Normocephalic, atraumatic.  Pupils are round and symmetric. Sclerae anicteric  NECK:  No JVD; Supple. No cervical, supraclavicular, or infraclavicular lymphadenopathy is palpable.  LUNGS: Bilaterally clear to auscultation.  No rales, rhonci, or wheezing are present. Good inspiratory effort, no accessory muscle use.   CARDIAC: Regular rate and rhythm, without murmurs, clicks, or gallops   ABDOMEN: Normoactive bowels sounds are present.  Soft. Non-tender and non-distended.  No hepatosplenomegaly or masses are present.  EXTREMITIES: No cyanosis, clubbing or edema is present.  FROM  NEUROLOGIC: Gait unremarkable. The patient is alert and oriented.  CN II-XII are grossly intact.  Sensation to light touch is intact and symmetric in the fingers

## 2025-06-23 NOTE — PLAN OF CARE
Radiation education and handouts given. Side effects and management reviewed with pt and pt's daughter. All questions answered and pt voices understanding.    Pt to return to [facility] on 7/7/2025 at 1 PM for CT simulation. No prep required.    Nursing Care Plan for Pelvis Radiation - PROSTATE    Pain related to cancer diagnosis and treatment.    Interventions   Pain assessment.   Monitor pharmacological pain medication.   Teach relaxation and repositioning techniques.     Expected Outcome   Maintain patient's acceptable pain level or <5 on pain scale.       Knowledge deficit related to diagnosis and treatment plan.    Interventions   Assess patient's ability to comprehend diagnosis and treatment plan.   Provide educational materials and teaching regarding plan of care.   Provide emotional support and continued education.   Refer to psychosocial coordinator if further assistance needed.     Expected Outcome   Patient voices understanding of diagnosis and treatment plan.       Altered skin integrity related to treatment.    Interventions   Evaluation of skin integrity at therapy site.   Advise patient on appropriate skin care.   Instruct patient on recommended lotions/ointments/creams/dressing changes to use on therapy site.     Expected Outcome   Minimize adverse skin reaction/infection at treatment site.       Altered genitourinary function.    Interventions   Evaluate for treatment side effects.   Evaluate treatment modalities for effectiveness.   Monitor I & O.     Expected Outcome   Patient with minimal urinary complications as evidenced by adequate urinary output.       Gastrointestinal disturbances due to treatment process.    Interventions   Evaluate for treatment side effects.   Evaluate treatment modalities for effectiveness.   Initiate and educate on appropriate bowel program if needed.     Expected Outcome   Patient with minimal bowel complications and controlled management of side effects.

## 2025-07-02 ENCOUNTER — HOSPITAL ENCOUNTER (OUTPATIENT)
Dept: PET IMAGING | Age: 89
Discharge: HOME | End: 2025-07-02
Attending: SPECIALIST
Payer: MEDICARE

## 2025-07-02 DIAGNOSIS — C61 MALIGNANT NEOPLASM OF PROSTATE (HCC): ICD-10-CM

## 2025-07-02 PROCEDURE — 2500000003 HC RX 250 WO HCPCS: Performed by: SPECIALIST

## 2025-07-02 PROCEDURE — 78815 PET IMAGE W/CT SKULL-THIGH: CPT

## 2025-07-02 RX ORDER — SODIUM CHLORIDE 0.9 % (FLUSH) 0.9 %
5-40 SYRINGE (ML) INJECTION EVERY 12 HOURS SCHEDULED
Status: DISCONTINUED | OUTPATIENT
Start: 2025-07-02 | End: 2025-07-03 | Stop reason: HOSPADM

## 2025-07-02 RX ORDER — SODIUM CHLORIDE 0.9 % (FLUSH) 0.9 %
5-40 SYRINGE (ML) INJECTION PRN
Status: DISCONTINUED | OUTPATIENT
Start: 2025-07-02 | End: 2025-07-03 | Stop reason: HOSPADM

## 2025-07-02 RX ORDER — SODIUM CHLORIDE 9 MG/ML
INJECTION, SOLUTION INTRAVENOUS PRN
Status: DISCONTINUED | OUTPATIENT
Start: 2025-07-02 | End: 2025-07-03 | Stop reason: HOSPADM

## 2025-07-02 RX ADMIN — SODIUM CHLORIDE, PRESERVATIVE FREE 10 ML: 5 INJECTION INTRAVENOUS at 13:44

## 2025-07-07 ENCOUNTER — HOSPITAL ENCOUNTER (OUTPATIENT)
Dept: RADIATION ONCOLOGY | Age: 89
Discharge: HOME | End: 2025-07-07
Payer: MEDICARE

## 2025-07-07 PROCEDURE — 77334 RADIATION TREATMENT AID(S): CPT | Performed by: RADIOLOGY

## 2025-07-10 PROCEDURE — 77338 DESIGN MLC DEVICE FOR IMRT: CPT | Performed by: RADIOLOGY

## 2025-07-10 PROCEDURE — 77300 RADIATION THERAPY DOSE PLAN: CPT | Performed by: RADIOLOGY

## 2025-07-10 PROCEDURE — 77301 RADIOTHERAPY DOSE PLAN IMRT: CPT | Performed by: RADIOLOGY

## 2025-07-17 ENCOUNTER — HOSPITAL ENCOUNTER (OUTPATIENT)
Dept: RADIATION ONCOLOGY | Age: 89
Discharge: HOME | End: 2025-07-17
Payer: MEDICARE

## 2025-07-17 ENCOUNTER — OFFICE VISIT (OUTPATIENT)
Dept: ONCOLOGY | Age: 89
End: 2025-07-17
Payer: MEDICARE

## 2025-07-17 ENCOUNTER — HOSPITAL ENCOUNTER (OUTPATIENT)
Dept: INFUSION THERAPY | Age: 89
Discharge: HOME | End: 2025-07-17
Payer: MEDICARE

## 2025-07-17 VITALS
WEIGHT: 169.6 LBS | BODY MASS INDEX: 23.74 KG/M2 | DIASTOLIC BLOOD PRESSURE: 57 MMHG | TEMPERATURE: 97.6 F | HEART RATE: 62 BPM | HEIGHT: 71 IN | SYSTOLIC BLOOD PRESSURE: 133 MMHG | OXYGEN SATURATION: 100 %

## 2025-07-17 DIAGNOSIS — D64.9 ANEMIA, UNSPECIFIED TYPE: ICD-10-CM

## 2025-07-17 DIAGNOSIS — D64.9 ACUTE ANEMIA: Primary | ICD-10-CM

## 2025-07-17 DIAGNOSIS — D64.9 ACUTE ANEMIA: ICD-10-CM

## 2025-07-17 LAB
BASOPHILS # BLD: 0.02 K/UL
BASOPHILS NFR BLD: 0 % (ref 0–1)
BONE MARROW REPORT: NORMAL
EOSINOPHIL # BLD: 0.11 K/UL
EOSINOPHILS RELATIVE PERCENT: 2 % (ref 0–3)
ERYTHROCYTE [DISTWIDTH] IN BLOOD BY AUTOMATED COUNT: 15.2 % (ref 11.7–14.9)
FERRITIN SERPL-MCNC: 204 NG/ML (ref 30–400)
HCT VFR BLD AUTO: 26.9 % (ref 42–52)
HGB BLD-MCNC: 8.5 G/DL (ref 13.5–18)
IRON SATN MFR SERPL: 19 % (ref 15–50)
IRON SERPL-MCNC: 43 UG/DL (ref 59–158)
LYMPHOCYTES NFR BLD: 1.51 K/UL
LYMPHOCYTES RELATIVE PERCENT: 28 % (ref 24–44)
MCH RBC QN AUTO: 28.6 PG (ref 27–31)
MCHC RBC AUTO-ENTMCNC: 31.6 G/DL (ref 32–36)
MCV RBC AUTO: 90.6 FL (ref 78–100)
MONOCYTES NFR BLD: 0.64 K/UL
MONOCYTES NFR BLD: 12 % (ref 0–5)
NEUTROPHILS NFR BLD: 58 % (ref 36–66)
NEUTS SEG NFR BLD: 3.12 K/UL
PLATELET # BLD AUTO: 133 K/UL (ref 140–440)
PMV BLD AUTO: 11.5 FL (ref 7.5–11.1)
RBC # BLD AUTO: 2.97 M/UL (ref 4.6–6.2)
TIBC SERPL-MCNC: 223 UG/DL (ref 260–445)
UNSATURATED IRON BINDING CAPACITY: 180 UG/DL (ref 110–370)
WBC OTHER # BLD: 5.4 K/UL (ref 4–10.5)

## 2025-07-17 PROCEDURE — 83550 IRON BINDING TEST: CPT

## 2025-07-17 PROCEDURE — 85025 COMPLETE CBC W/AUTO DIFF WBC: CPT

## 2025-07-17 PROCEDURE — 36415 COLL VENOUS BLD VENIPUNCTURE: CPT

## 2025-07-17 PROCEDURE — 1036F TOBACCO NON-USER: CPT | Performed by: INTERNAL MEDICINE

## 2025-07-17 PROCEDURE — 1124F ACP DISCUSS-NO DSCNMKR DOCD: CPT | Performed by: INTERNAL MEDICINE

## 2025-07-17 PROCEDURE — 99212 OFFICE O/P EST SF 10 MIN: CPT

## 2025-07-17 PROCEDURE — 82728 ASSAY OF FERRITIN: CPT

## 2025-07-17 PROCEDURE — 83540 ASSAY OF IRON: CPT

## 2025-07-17 PROCEDURE — 99213 OFFICE O/P EST LOW 20 MIN: CPT | Performed by: INTERNAL MEDICINE

## 2025-07-17 PROCEDURE — G8427 DOCREV CUR MEDS BY ELIG CLIN: HCPCS | Performed by: INTERNAL MEDICINE

## 2025-07-17 PROCEDURE — 1159F MED LIST DOCD IN RCRD: CPT | Performed by: INTERNAL MEDICINE

## 2025-07-17 PROCEDURE — 77385 HC NTSTY MODUL RAD TX DLVR SMPL: CPT | Performed by: RADIOLOGY

## 2025-07-17 PROCEDURE — G8420 CALC BMI NORM PARAMETERS: HCPCS | Performed by: INTERNAL MEDICINE

## 2025-07-17 NOTE — PROGRESS NOTES
MA/LPN Rooming Questions  Patient: Luca Mckeon  MRN: [id number]    Date: 7/17/2025        1. Do you have any new issues?   yes - Irritation in the groin area         2. Do you need any refills on medications?    no    3. Have you had any imaging done since your last visit?   yes - [facility] 7/2    4. Have you been hospitalized or seen in the emergency room since your last visit here?   no    5. Did the patient have a depression screening completed today? No    No data recorded     PHQ-9 Given to (if applicable):               PHQ-9 Score (if applicable):                     [] Positive     []  Negative              Does question #9 need addressed (if applicable)                     [] Yes    []  No               Tabatha Painter MA

## 2025-07-18 ENCOUNTER — HOSPITAL ENCOUNTER (OUTPATIENT)
Dept: RADIATION ONCOLOGY | Age: 89
Discharge: HOME | End: 2025-07-18
Payer: MEDICARE

## 2025-07-21 ENCOUNTER — HOSPITAL ENCOUNTER (OUTPATIENT)
Dept: RADIATION ONCOLOGY | Age: 89
Discharge: HOME | End: 2025-07-21
Payer: MEDICARE

## 2025-07-21 PROCEDURE — 77014 CHG CT GUIDANCE RADIATION THERAPY FLDS PLACEMENT: CPT | Performed by: RADIOLOGY

## 2025-07-21 PROCEDURE — 77385 HC NTSTY MODUL RAD TX DLVR SMPL: CPT | Performed by: RADIOLOGY

## 2025-07-22 ENCOUNTER — HOSPITAL ENCOUNTER (OUTPATIENT)
Dept: RADIATION ONCOLOGY | Age: 89
Discharge: HOME | End: 2025-07-22
Payer: MEDICARE

## 2025-07-22 VITALS — BODY MASS INDEX: 23.85 KG/M2 | WEIGHT: 171 LBS

## 2025-07-22 PROCEDURE — 77385 HC NTSTY MODUL RAD TX DLVR SMPL: CPT | Performed by: RADIOLOGY

## 2025-07-22 NOTE — PLAN OF CARE
Care plan reviewed.    - Pt informed this RN that he has an ED stimulator he had not mentioned previously. It has been causing some tenderness and pain to right lower inguinal area. Pt's urologist implanted device. Pt to call urology for advice regarding issue. Dr. Garcia notified.

## 2025-07-22 NOTE — PROGRESS NOTES
Weekly Radiation Treatment Progress Note    DATE OF SERVICE: 7/22/2025     DIAGNOSIS:  unfavorable IR Prostate cancer shira 4+3, [id number], PSA 11 (dx'd 2023)        TREATMENT COURSE:         Site: Prostate  Current Total Radiation Dose: 1000 cGy  Fraction: 4/28    Just started, no major issues. Has implant that is uncomfortable at times. Seeing urology in few weeks.     EXAM  Wt Readings from Last 3 Encounters:   07/17/25 76.9 kg (169 lb 9.6 oz)   06/23/25 78.8 kg (173 lb 12.8 oz)   06/17/25 79.2 kg (174 lb 9.6 oz)     NAD    Setup images, chart, plan reviewed    A/P:   Tolerating RT well  Continue RT as planned      Electronically signed by Morales Garcia MD on 7/22/2025 at 12:12 PM

## 2025-07-23 ENCOUNTER — HOSPITAL ENCOUNTER (OUTPATIENT)
Dept: RADIATION ONCOLOGY | Age: 89
Discharge: HOME | End: 2025-07-23
Payer: MEDICARE

## 2025-07-23 PROCEDURE — 77385 HC NTSTY MODUL RAD TX DLVR SMPL: CPT | Performed by: RADIOLOGY

## 2025-07-23 PROCEDURE — 77336 RADIATION PHYSICS CONSULT: CPT | Performed by: RADIOLOGY

## 2025-07-24 ENCOUNTER — HOSPITAL ENCOUNTER (OUTPATIENT)
Dept: RADIATION ONCOLOGY | Age: 89
Discharge: HOME | End: 2025-07-24
Payer: MEDICARE

## 2025-07-24 LAB
CYTOGENETICS BM REPORT: NORMAL
FISH: NORMAL
FLOW CYTOMETRY: NORMAL

## 2025-07-24 PROCEDURE — 77385 HC NTSTY MODUL RAD TX DLVR SMPL: CPT | Performed by: RADIOLOGY

## 2025-07-25 ENCOUNTER — HOSPITAL ENCOUNTER (OUTPATIENT)
Dept: RADIATION ONCOLOGY | Age: 89
Discharge: HOME | End: 2025-07-25
Payer: MEDICARE

## 2025-07-25 PROCEDURE — 77385 HC NTSTY MODUL RAD TX DLVR SMPL: CPT | Performed by: RADIOLOGY

## 2025-07-28 ENCOUNTER — HOSPITAL ENCOUNTER (OUTPATIENT)
Dept: RADIATION ONCOLOGY | Age: 89
Discharge: HOME | End: 2025-07-28
Payer: MEDICARE

## 2025-07-28 ENCOUNTER — HOSPITAL ENCOUNTER (OUTPATIENT)
Age: 89
Discharge: HOME | End: 2025-07-28
Payer: MEDICARE

## 2025-07-28 DIAGNOSIS — N17.9 ACUTE KIDNEY INJURY SUPERIMPOSED ON CKD: ICD-10-CM

## 2025-07-28 DIAGNOSIS — N18.9 ACUTE KIDNEY INJURY SUPERIMPOSED ON CKD: ICD-10-CM

## 2025-07-28 LAB
ANION GAP SERPL CALCULATED.3IONS-SCNC: 13 MMOL/L (ref 9–17)
BUN SERPL-MCNC: 38 MG/DL (ref 7–20)
CALCIUM SERPL-MCNC: 9.6 MG/DL (ref 8.3–10.6)
CHLORIDE SERPL-SCNC: 105 MMOL/L (ref 99–110)
CO2 SERPL-SCNC: 22 MMOL/L (ref 21–32)
CREAT SERPL-MCNC: 3 MG/DL (ref 0.8–1.3)
GFR, ESTIMATED: 20 ML/MIN/1.73M2
GLUCOSE SERPL-MCNC: 102 MG/DL (ref 74–99)
POTASSIUM SERPL-SCNC: 3.9 MMOL/L (ref 3.5–5.1)
SODIUM SERPL-SCNC: 140 MMOL/L (ref 136–145)

## 2025-07-28 PROCEDURE — 77385 HC NTSTY MODUL RAD TX DLVR SMPL: CPT | Performed by: RADIOLOGY

## 2025-07-28 PROCEDURE — 80048 BASIC METABOLIC PNL TOTAL CA: CPT

## 2025-07-28 PROCEDURE — 77014 CHG CT GUIDANCE RADIATION THERAPY FLDS PLACEMENT: CPT | Performed by: RADIOLOGY

## 2025-07-29 ENCOUNTER — HOSPITAL ENCOUNTER (OUTPATIENT)
Dept: RADIATION ONCOLOGY | Age: 89
Discharge: HOME | End: 2025-07-29
Payer: MEDICARE

## 2025-07-29 VITALS — WEIGHT: 171.2 LBS | BODY MASS INDEX: 23.88 KG/M2

## 2025-07-29 PROCEDURE — 77385 HC NTSTY MODUL RAD TX DLVR SMPL: CPT | Performed by: RADIOLOGY

## 2025-07-29 ASSESSMENT — PAIN SCALES - GENERAL: PAINLEVEL_OUTOF10: 0

## 2025-07-29 NOTE — PLAN OF CARE
Care plan reviewed.     - Pt still reporting some tenderness and pain to right lower inguinal area, voiced related to ED implant. Pt with follow up appt with Dr. Norris next month to discuss above. Dr. Garcia aware.    _ Pt c/o increased fatigue, tolerating ADLs without difficulty. Uses can to help steady during ambulation.

## 2025-07-29 NOTE — PROGRESS NOTES
Weekly Radiation Treatment Progress Note    DATE OF SERVICE: 7/29/2025     DIAGNOSIS:   unfavorable IR Prostate cancer shira 4+3, [id number], PSA 11 (dx'd 2023)        TREATMENT COURSE:       Site: Prostate  Current Total Radiation Dose: 2250 cGy  Fraction: 9/28    Pt doing well. Less energy since starting.   No new problems or issues.     EXAM  Wt Readings from Last 3 Encounters:   07/29/25 77.7 kg (171 lb 3.2 oz)   07/22/25 77.6 kg (171 lb)   07/17/25 76.9 kg (169 lb 9.6 oz)     NAD    Setup images, chart, plan reviewed    A/P:   Tolerating RT well  Continue RT as planned      Electronically signed by Morales Garcia MD on 7/29/2025 at 12:20 PM

## 2025-07-30 ENCOUNTER — HOSPITAL ENCOUNTER (OUTPATIENT)
Dept: RADIATION ONCOLOGY | Age: 89
Discharge: HOME | End: 2025-07-30
Payer: MEDICARE

## 2025-07-30 PROCEDURE — 77385 HC NTSTY MODUL RAD TX DLVR SMPL: CPT | Performed by: RADIOLOGY

## 2025-07-30 PROCEDURE — 77336 RADIATION PHYSICS CONSULT: CPT | Performed by: RADIOLOGY

## 2025-07-31 ENCOUNTER — HOSPITAL ENCOUNTER (OUTPATIENT)
Dept: RADIATION ONCOLOGY | Age: 89
Discharge: HOME | End: 2025-07-31
Payer: MEDICARE

## 2025-07-31 PROCEDURE — 77385 HC NTSTY MODUL RAD TX DLVR SMPL: CPT | Performed by: RADIOLOGY

## 2025-08-01 ENCOUNTER — HOSPITAL ENCOUNTER (OUTPATIENT)
Dept: RADIATION ONCOLOGY | Age: 89
Discharge: HOME | End: 2025-08-01
Payer: MEDICARE

## 2025-08-01 PROCEDURE — 77385 HC NTSTY MODUL RAD TX DLVR SMPL: CPT | Performed by: RADIOLOGY

## 2025-08-04 ENCOUNTER — HOSPITAL ENCOUNTER (OUTPATIENT)
Dept: RADIATION ONCOLOGY | Age: 89
Discharge: HOME | End: 2025-08-04
Payer: MEDICARE

## 2025-08-04 PROCEDURE — 77385 HC NTSTY MODUL RAD TX DLVR SMPL: CPT | Performed by: RADIOLOGY

## 2025-08-05 ENCOUNTER — HOSPITAL ENCOUNTER (OUTPATIENT)
Dept: RADIATION ONCOLOGY | Age: 89
Discharge: HOME | End: 2025-08-05
Payer: MEDICARE

## 2025-08-05 PROCEDURE — 77385 HC NTSTY MODUL RAD TX DLVR SMPL: CPT | Performed by: RADIOLOGY

## 2025-08-06 ENCOUNTER — HOSPITAL ENCOUNTER (OUTPATIENT)
Dept: RADIATION ONCOLOGY | Age: 89
Discharge: HOME | End: 2025-08-06
Payer: MEDICARE

## 2025-08-06 PROCEDURE — 77336 RADIATION PHYSICS CONSULT: CPT | Performed by: RADIOLOGY

## 2025-08-06 PROCEDURE — 77385 HC NTSTY MODUL RAD TX DLVR SMPL: CPT | Performed by: RADIOLOGY

## 2025-08-07 ENCOUNTER — HOSPITAL ENCOUNTER (OUTPATIENT)
Dept: RADIATION ONCOLOGY | Age: 89
Discharge: HOME | End: 2025-08-07
Payer: MEDICARE

## 2025-08-07 PROCEDURE — 77385 HC NTSTY MODUL RAD TX DLVR SMPL: CPT | Performed by: RADIOLOGY

## 2025-08-08 ENCOUNTER — HOSPITAL ENCOUNTER (OUTPATIENT)
Dept: RADIATION ONCOLOGY | Age: 89
Discharge: HOME | End: 2025-08-08
Payer: MEDICARE

## 2025-08-08 PROCEDURE — 77385 HC NTSTY MODUL RAD TX DLVR SMPL: CPT | Performed by: RADIOLOGY

## 2025-08-11 ENCOUNTER — HOSPITAL ENCOUNTER (OUTPATIENT)
Dept: RADIATION ONCOLOGY | Age: 89
Discharge: HOME | End: 2025-08-11
Payer: MEDICARE

## 2025-08-11 PROCEDURE — 77385 HC NTSTY MODUL RAD TX DLVR SMPL: CPT | Performed by: RADIOLOGY

## 2025-08-12 ENCOUNTER — HOSPITAL ENCOUNTER (OUTPATIENT)
Dept: RADIATION ONCOLOGY | Age: 89
Discharge: HOME | End: 2025-08-12
Payer: MEDICARE

## 2025-08-12 VITALS — BODY MASS INDEX: 23.55 KG/M2 | WEIGHT: 168.8 LBS

## 2025-08-12 PROCEDURE — 77385 HC NTSTY MODUL RAD TX DLVR SMPL: CPT | Performed by: RADIOLOGY

## 2025-08-12 ASSESSMENT — PAIN SCALES - GENERAL: PAINLEVEL_OUTOF10: 0

## 2025-08-13 ENCOUNTER — HOSPITAL ENCOUNTER (OUTPATIENT)
Dept: RADIATION ONCOLOGY | Age: 89
Discharge: HOME | End: 2025-08-13
Payer: MEDICARE

## 2025-08-13 PROCEDURE — 77385 HC NTSTY MODUL RAD TX DLVR SMPL: CPT | Performed by: RADIOLOGY

## 2025-08-13 PROCEDURE — 77336 RADIATION PHYSICS CONSULT: CPT | Performed by: RADIOLOGY

## 2025-08-14 ENCOUNTER — HOSPITAL ENCOUNTER (OUTPATIENT)
Dept: RADIATION ONCOLOGY | Age: 89
Discharge: HOME | End: 2025-08-14
Payer: MEDICARE

## 2025-08-14 PROCEDURE — 77385 HC NTSTY MODUL RAD TX DLVR SMPL: CPT | Performed by: RADIOLOGY

## 2025-08-15 ENCOUNTER — HOSPITAL ENCOUNTER (OUTPATIENT)
Dept: RADIATION ONCOLOGY | Age: 89
Discharge: HOME | End: 2025-08-15
Payer: MEDICARE

## 2025-08-15 PROCEDURE — 77385 HC NTSTY MODUL RAD TX DLVR SMPL: CPT | Performed by: RADIOLOGY

## 2025-08-18 ENCOUNTER — HOSPITAL ENCOUNTER (OUTPATIENT)
Dept: RADIATION ONCOLOGY | Age: 89
Discharge: HOME | End: 2025-08-18
Payer: MEDICARE

## 2025-08-18 PROCEDURE — 77385 HC NTSTY MODUL RAD TX DLVR SMPL: CPT | Performed by: RADIOLOGY

## 2025-08-18 PROCEDURE — 77014 CHG CT GUIDANCE RADIATION THERAPY FLDS PLACEMENT: CPT | Performed by: RADIOLOGY

## 2025-08-19 ENCOUNTER — HOSPITAL ENCOUNTER (OUTPATIENT)
Dept: RADIATION ONCOLOGY | Age: 89
Discharge: HOME | End: 2025-08-19
Payer: MEDICARE

## 2025-08-19 PROCEDURE — 77014 CHG CT GUIDANCE RADIATION THERAPY FLDS PLACEMENT: CPT | Performed by: RADIOLOGY

## 2025-08-19 PROCEDURE — 77385 HC NTSTY MODUL RAD TX DLVR SMPL: CPT | Performed by: RADIOLOGY

## 2025-08-20 ENCOUNTER — OFFICE VISIT (OUTPATIENT)
Dept: ONCOLOGY | Age: 89
End: 2025-08-20
Payer: MEDICARE

## 2025-08-20 ENCOUNTER — HOSPITAL ENCOUNTER (OUTPATIENT)
Dept: RADIATION ONCOLOGY | Age: 89
Discharge: HOME | End: 2025-08-20
Payer: MEDICARE

## 2025-08-20 ENCOUNTER — HOSPITAL ENCOUNTER (OUTPATIENT)
Dept: INFUSION THERAPY | Age: 89
Discharge: HOME | End: 2025-08-20
Payer: MEDICARE

## 2025-08-20 VITALS
OXYGEN SATURATION: 95 % | TEMPERATURE: 97.4 F | DIASTOLIC BLOOD PRESSURE: 64 MMHG | SYSTOLIC BLOOD PRESSURE: 140 MMHG | HEART RATE: 75 BPM

## 2025-08-20 DIAGNOSIS — D47.2 MGUS (MONOCLONAL GAMMOPATHY OF UNKNOWN SIGNIFICANCE): ICD-10-CM

## 2025-08-20 DIAGNOSIS — R91.1 LUNG NODULE: ICD-10-CM

## 2025-08-20 DIAGNOSIS — D47.2 MGUS (MONOCLONAL GAMMOPATHY OF UNKNOWN SIGNIFICANCE): Primary | ICD-10-CM

## 2025-08-20 LAB
ALBUMIN SERPL-MCNC: 3.7 G/DL (ref 3.4–5)
ALBUMIN/GLOB SERPL: 1 {RATIO} (ref 1.1–2.2)
ALP SERPL-CCNC: 78 U/L (ref 40–129)
ALT SERPL-CCNC: 7 U/L (ref 10–40)
ANION GAP SERPL CALCULATED.3IONS-SCNC: 11 MMOL/L (ref 9–17)
AST SERPL-CCNC: 17 U/L (ref 15–37)
BASOPHILS # BLD: 0.02 K/UL
BASOPHILS NFR BLD: 0 % (ref 0–1)
BILIRUB SERPL-MCNC: 0.4 MG/DL (ref 0–1)
BUN SERPL-MCNC: 41 MG/DL (ref 7–20)
CALCIUM SERPL-MCNC: 9.6 MG/DL (ref 8.3–10.6)
CHLORIDE SERPL-SCNC: 104 MMOL/L (ref 99–110)
CO2 SERPL-SCNC: 24 MMOL/L (ref 21–32)
CREAT SERPL-MCNC: 2.9 MG/DL (ref 0.8–1.3)
EOSINOPHIL # BLD: 0.17 K/UL
EOSINOPHILS RELATIVE PERCENT: 3 % (ref 0–3)
ERYTHROCYTE [DISTWIDTH] IN BLOOD BY AUTOMATED COUNT: 14.8 % (ref 11.7–14.9)
GFR, ESTIMATED: 20 ML/MIN/1.73M2
GLUCOSE SERPL-MCNC: 100 MG/DL (ref 74–99)
HCT VFR BLD AUTO: 26.7 % (ref 42–52)
HGB BLD-MCNC: 8.3 G/DL (ref 13.5–18)
IGA SERPL-MCNC: 615 MG/DL (ref 70–400)
IGG SERPL-MCNC: 1523 MG/DL (ref 700–1600)
IGM SERPL-MCNC: 171 MG/DL (ref 40–230)
LDH SERPL-CCNC: 165 U/L (ref 100–190)
LYMPHOCYTES NFR BLD: 1.6 K/UL
LYMPHOCYTES RELATIVE PERCENT: 31 % (ref 24–44)
MCH RBC QN AUTO: 28.6 PG (ref 27–31)
MCHC RBC AUTO-ENTMCNC: 31.1 G/DL (ref 32–36)
MCV RBC AUTO: 92.1 FL (ref 78–100)
MONOCYTES NFR BLD: 0.64 K/UL
MONOCYTES NFR BLD: 13 % (ref 0–5)
NEUTROPHILS NFR BLD: 53 % (ref 36–66)
NEUTS SEG NFR BLD: 2.7 K/UL
PLATELET # BLD AUTO: 133 K/UL (ref 140–440)
PMV BLD AUTO: 11.1 FL (ref 7.5–11.1)
POTASSIUM SERPL-SCNC: 4.2 MMOL/L (ref 3.5–5.1)
PROT SERPL-MCNC: 7.4 G/DL (ref 6.4–8.2)
RBC # BLD AUTO: 2.9 M/UL (ref 4.6–6.2)
SODIUM SERPL-SCNC: 139 MMOL/L (ref 136–145)
WBC OTHER # BLD: 5.1 K/UL (ref 4–10.5)

## 2025-08-20 PROCEDURE — 1125F AMNT PAIN NOTED PAIN PRSNT: CPT | Performed by: INTERNAL MEDICINE

## 2025-08-20 PROCEDURE — 77385 HC NTSTY MODUL RAD TX DLVR SMPL: CPT | Performed by: RADIOLOGY

## 2025-08-20 PROCEDURE — 84155 ASSAY OF PROTEIN SERUM: CPT

## 2025-08-20 PROCEDURE — 1036F TOBACCO NON-USER: CPT | Performed by: INTERNAL MEDICINE

## 2025-08-20 PROCEDURE — 83615 LACTATE (LD) (LDH) ENZYME: CPT

## 2025-08-20 PROCEDURE — G8420 CALC BMI NORM PARAMETERS: HCPCS | Performed by: INTERNAL MEDICINE

## 2025-08-20 PROCEDURE — 84165 PROTEIN E-PHORESIS SERUM: CPT

## 2025-08-20 PROCEDURE — 82784 ASSAY IGA/IGD/IGG/IGM EACH: CPT

## 2025-08-20 PROCEDURE — 1159F MED LIST DOCD IN RCRD: CPT | Performed by: INTERNAL MEDICINE

## 2025-08-20 PROCEDURE — 80053 COMPREHEN METABOLIC PANEL: CPT

## 2025-08-20 PROCEDURE — 85025 COMPLETE CBC W/AUTO DIFF WBC: CPT

## 2025-08-20 PROCEDURE — 36415 COLL VENOUS BLD VENIPUNCTURE: CPT

## 2025-08-20 PROCEDURE — G8427 DOCREV CUR MEDS BY ELIG CLIN: HCPCS | Performed by: INTERNAL MEDICINE

## 2025-08-20 PROCEDURE — 83521 IG LIGHT CHAINS FREE EACH: CPT

## 2025-08-20 PROCEDURE — 99212 OFFICE O/P EST SF 10 MIN: CPT

## 2025-08-20 PROCEDURE — 1124F ACP DISCUSS-NO DSCNMKR DOCD: CPT | Performed by: INTERNAL MEDICINE

## 2025-08-20 PROCEDURE — 77336 RADIATION PHYSICS CONSULT: CPT | Performed by: RADIOLOGY

## 2025-08-20 PROCEDURE — 99213 OFFICE O/P EST LOW 20 MIN: CPT | Performed by: INTERNAL MEDICINE

## 2025-08-20 PROCEDURE — 86334 IMMUNOFIX E-PHORESIS SERUM: CPT

## 2025-08-21 ENCOUNTER — HOSPITAL ENCOUNTER (OUTPATIENT)
Dept: RADIATION ONCOLOGY | Age: 89
Discharge: HOME | End: 2025-08-21
Payer: MEDICARE

## 2025-08-21 ENCOUNTER — TELEPHONE (OUTPATIENT)
Dept: ONCOLOGY | Age: 89
End: 2025-08-21

## 2025-08-21 PROCEDURE — 77385 HC NTSTY MODUL RAD TX DLVR SMPL: CPT | Performed by: RADIOLOGY

## 2025-08-22 ENCOUNTER — HOSPITAL ENCOUNTER (OUTPATIENT)
Dept: RADIATION ONCOLOGY | Age: 89
Discharge: HOME | End: 2025-08-22
Payer: MEDICARE

## 2025-08-22 LAB
COMMENT: ABNORMAL
KAPPA FREE LIGHT CHAIN: 106 MG/L (ref 2.37–20.73)
KAPPA/LAMBDA RATIO: 0.72 (ref 0.22–1.74)
LAMBDA FREE LIGHT CHAIN: 148 MG/L (ref 4.23–27.69)
MISCELLANEOUS LAB TEST RESULT: NORMAL
MISCELLANEOUS LAB TEST RESULT: NORMAL
TEST NAME: NORMAL
TEST NAME: NORMAL

## 2025-08-22 PROCEDURE — 77385 HC NTSTY MODUL RAD TX DLVR SMPL: CPT | Performed by: RADIOLOGY

## 2025-08-25 ENCOUNTER — HOSPITAL ENCOUNTER (OUTPATIENT)
Dept: RADIATION ONCOLOGY | Age: 89
Discharge: HOME | End: 2025-08-25
Payer: MEDICARE

## 2025-08-25 VITALS — WEIGHT: 170 LBS | BODY MASS INDEX: 23.72 KG/M2

## 2025-08-25 DIAGNOSIS — C61 PROSTATE CANCER (HCC): Primary | ICD-10-CM

## 2025-08-25 LAB
MISCELLANEOUS LAB TEST RESULT: NORMAL
TEST NAME: NORMAL

## 2025-08-25 PROCEDURE — 77014 CHG CT GUIDANCE RADIATION THERAPY FLDS PLACEMENT: CPT | Performed by: RADIOLOGY

## 2025-08-25 PROCEDURE — 77385 HC NTSTY MODUL RAD TX DLVR SMPL: CPT | Performed by: RADIOLOGY

## 2025-08-25 ASSESSMENT — PAIN SCALES - GENERAL: PAINLEVEL_OUTOF10: 0

## 2025-08-26 LAB
MISCELLANEOUS LAB TEST RESULT: ABNORMAL
TEST NAME: ABNORMAL

## 2025-09-05 ENCOUNTER — HOSPITAL ENCOUNTER (OUTPATIENT)
Dept: CT IMAGING | Age: 89
Discharge: HOME | End: 2025-09-05
Attending: INTERNAL MEDICINE
Payer: MEDICARE

## 2025-09-05 DIAGNOSIS — R91.1 LUNG NODULE: ICD-10-CM

## 2025-09-05 DIAGNOSIS — D47.2 MGUS (MONOCLONAL GAMMOPATHY OF UNKNOWN SIGNIFICANCE): ICD-10-CM

## 2025-09-05 PROCEDURE — 71250 CT THORAX DX C-: CPT

## (undated) DEVICE — Z DISCONTINUED (USE MFG CAT MVABO)  TUBING GAS SAMPLING STD 6.5 FT FEMALE CONN SMRT CAPNOLINE

## (undated) DEVICE — Z DISCONTINUED NO SUB IDED TUBING ETCO2 AD L6.5FT NSL ORAL CVD PRNG NONFLARED TIP OVR